# Patient Record
Sex: MALE | Race: WHITE | NOT HISPANIC OR LATINO | URBAN - METROPOLITAN AREA
[De-identification: names, ages, dates, MRNs, and addresses within clinical notes are randomized per-mention and may not be internally consistent; named-entity substitution may affect disease eponyms.]

---

## 2022-12-02 ENCOUNTER — EMERGENCY (EMERGENCY)
Facility: HOSPITAL | Age: 77
LOS: 1 days | Discharge: HOME | End: 2022-12-02
Attending: STUDENT IN AN ORGANIZED HEALTH CARE EDUCATION/TRAINING PROGRAM | Admitting: STUDENT IN AN ORGANIZED HEALTH CARE EDUCATION/TRAINING PROGRAM
Payer: MEDICARE

## 2022-12-02 VITALS
HEART RATE: 75 BPM | DIASTOLIC BLOOD PRESSURE: 79 MMHG | RESPIRATION RATE: 18 BRPM | OXYGEN SATURATION: 98 % | TEMPERATURE: 98 F | SYSTOLIC BLOOD PRESSURE: 114 MMHG

## 2022-12-02 DIAGNOSIS — R45.851 SUICIDAL IDEATIONS: ICD-10-CM
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
## 2022-12-02 DIAGNOSIS — F33.9 MAJOR DEPRESSIVE DISORDER, RECURRENT, UNSPECIFIED: ICD-10-CM

## 2022-12-02 DIAGNOSIS — R45.84 ANHEDONIA: ICD-10-CM

## 2022-12-02 DIAGNOSIS — E11.9 TYPE 2 DIABETES MELLITUS WITHOUT COMPLICATIONS: ICD-10-CM

## 2022-12-02 DIAGNOSIS — Z79.84 LONG TERM (CURRENT) USE OF ORAL HYPOGLYCEMIC DRUGS: ICD-10-CM

## 2022-12-02 DIAGNOSIS — U07.1 COVID-19: ICD-10-CM

## 2022-12-02 DIAGNOSIS — Z91.14 PATIENT'S OTHER NONCOMPLIANCE WITH MEDICATION REGIMEN: ICD-10-CM

## 2022-12-02 DIAGNOSIS — T43.226A UNDERDOSING OF SELECTIVE SEROTONIN REUPTAKE INHIBITORS, INITIAL ENCOUNTER: ICD-10-CM
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
## 2022-12-02 DIAGNOSIS — F43.20 ADJUSTMENT DISORDER, UNSPECIFIED: ICD-10-CM

## 2022-12-02 DIAGNOSIS — I10 ESSENTIAL (PRIMARY) HYPERTENSION: ICD-10-CM

## 2022-12-02 DIAGNOSIS — E78.5 HYPERLIPIDEMIA, UNSPECIFIED: ICD-10-CM

## 2022-12-02 LAB
AMPHET UR-MCNC: NEGATIVE — SIGNIFICANT CHANGE UP
ANION GAP SERPL CALC-SCNC: 12 MMOL/L — SIGNIFICANT CHANGE UP (ref 5–17)
APAP SERPL-MCNC: <5 UG/ML — LOW (ref 10–30)
APPEARANCE UR: ABNORMAL
BACTERIA # UR AUTO: PRESENT /HPF
BARBITURATES UR SCN-MCNC: NEGATIVE — SIGNIFICANT CHANGE UP
BASOPHILS # BLD AUTO: 0.01 K/UL — SIGNIFICANT CHANGE UP (ref 0–0.2)
BASOPHILS NFR BLD AUTO: 0.2 % — SIGNIFICANT CHANGE UP (ref 0–2)
BENZODIAZ UR-MCNC: NEGATIVE — SIGNIFICANT CHANGE UP
BILIRUB UR-MCNC: ABNORMAL
BUN SERPL-MCNC: 17 MG/DL — SIGNIFICANT CHANGE UP (ref 7–23)
CALCIUM SERPL-MCNC: 8.9 MG/DL — SIGNIFICANT CHANGE UP (ref 8.4–10.5)
CHLORIDE SERPL-SCNC: 100 MMOL/L — SIGNIFICANT CHANGE UP (ref 96–108)
CO2 SERPL-SCNC: 28 MMOL/L — SIGNIFICANT CHANGE UP (ref 22–31)
COCAINE METAB.OTHER UR-MCNC: NEGATIVE — SIGNIFICANT CHANGE UP
COLOR SPEC: YELLOW — SIGNIFICANT CHANGE UP
COMMENT - URINE: SIGNIFICANT CHANGE UP
CREAT SERPL-MCNC: 0.69 MG/DL — SIGNIFICANT CHANGE UP (ref 0.5–1.3)
DIFF PNL FLD: NEGATIVE — SIGNIFICANT CHANGE UP
EGFR: 95 ML/MIN/1.73M2 — SIGNIFICANT CHANGE UP
EOSINOPHIL # BLD AUTO: 0.01 K/UL — SIGNIFICANT CHANGE UP (ref 0–0.5)
EOSINOPHIL NFR BLD AUTO: 0.2 % — SIGNIFICANT CHANGE UP (ref 0–6)
EPI CELLS # UR: SIGNIFICANT CHANGE UP /HPF (ref 0–5)
ETHANOL SERPL-MCNC: <10 MG/DL — SIGNIFICANT CHANGE UP (ref 0–10)
GLUCOSE SERPL-MCNC: 139 MG/DL — HIGH (ref 70–99)
GLUCOSE UR QL: NEGATIVE — SIGNIFICANT CHANGE UP
HCT VFR BLD CALC: 41.5 % — SIGNIFICANT CHANGE UP (ref 39–50)
HGB BLD-MCNC: 14.4 G/DL — SIGNIFICANT CHANGE UP (ref 13–17)
HYALINE CASTS # UR AUTO: SIGNIFICANT CHANGE UP /LPF (ref 0–2)
IMM GRANULOCYTES NFR BLD AUTO: 0.5 % — SIGNIFICANT CHANGE UP (ref 0–0.9)
KETONES UR-MCNC: >=80 MG/DL
LEUKOCYTE ESTERASE UR-ACNC: NEGATIVE — SIGNIFICANT CHANGE UP
LYMPHOCYTES # BLD AUTO: 2 K/UL — SIGNIFICANT CHANGE UP (ref 1–3.3)
LYMPHOCYTES # BLD AUTO: 33.2 % — SIGNIFICANT CHANGE UP (ref 13–44)
MCHC RBC-ENTMCNC: 32.9 PG — SIGNIFICANT CHANGE UP (ref 27–34)
MCHC RBC-ENTMCNC: 34.7 GM/DL — SIGNIFICANT CHANGE UP (ref 32–36)
MCV RBC AUTO: 94.7 FL — SIGNIFICANT CHANGE UP (ref 80–100)
METHADONE UR-MCNC: NEGATIVE — SIGNIFICANT CHANGE UP
MONOCYTES # BLD AUTO: 0.44 K/UL — SIGNIFICANT CHANGE UP (ref 0–0.9)
MONOCYTES NFR BLD AUTO: 7.3 % — SIGNIFICANT CHANGE UP (ref 2–14)
NEUTROPHILS # BLD AUTO: 3.54 K/UL — SIGNIFICANT CHANGE UP (ref 1.8–7.4)
NEUTROPHILS NFR BLD AUTO: 58.6 % — SIGNIFICANT CHANGE UP (ref 43–77)
NITRITE UR-MCNC: NEGATIVE — SIGNIFICANT CHANGE UP
NRBC # BLD: 0 /100 WBCS — SIGNIFICANT CHANGE UP (ref 0–0)
OPIATES UR-MCNC: NEGATIVE — SIGNIFICANT CHANGE UP
PCP SPEC-MCNC: SIGNIFICANT CHANGE UP
PCP UR-MCNC: NEGATIVE — SIGNIFICANT CHANGE UP
PH UR: 6 — SIGNIFICANT CHANGE UP (ref 5–8)
PLATELET # BLD AUTO: 158 K/UL — SIGNIFICANT CHANGE UP (ref 150–400)
POTASSIUM SERPL-MCNC: 3.4 MMOL/L — LOW (ref 3.5–5.3)
POTASSIUM SERPL-SCNC: 3.4 MMOL/L — LOW (ref 3.5–5.3)
PROT UR-MCNC: 100 MG/DL
RAPID RVP RESULT: DETECTED
RBC # BLD: 4.38 M/UL — SIGNIFICANT CHANGE UP (ref 4.2–5.8)
RBC # FLD: 12.7 % — SIGNIFICANT CHANGE UP (ref 10.3–14.5)
RBC CASTS # UR COMP ASSIST: < 5 /HPF — SIGNIFICANT CHANGE UP
SALICYLATES SERPL-MCNC: 1.3 MG/DL — LOW (ref 2.8–20)
SARS-COV-2 RNA SPEC QL NAA+PROBE: DETECTED
SODIUM SERPL-SCNC: 140 MMOL/L — SIGNIFICANT CHANGE UP (ref 135–145)
SP GR SPEC: 1.02 — SIGNIFICANT CHANGE UP (ref 1–1.03)
THC UR QL: NEGATIVE — SIGNIFICANT CHANGE UP
TSH SERPL-MCNC: 1.24 UIU/ML — SIGNIFICANT CHANGE UP (ref 0.27–4.2)
UROBILINOGEN FLD QL: 1 E.U./DL — SIGNIFICANT CHANGE UP
WBC # BLD: 6.03 K/UL — SIGNIFICANT CHANGE UP (ref 3.8–10.5)
WBC # FLD AUTO: 6.03 K/UL — SIGNIFICANT CHANGE UP (ref 3.8–10.5)
WBC UR QL: < 5 /HPF — SIGNIFICANT CHANGE UP

## 2022-12-02 PROCEDURE — 99285 EMERGENCY DEPT VISIT HI MDM: CPT | Mod: FS,CS

## 2022-12-02 NOTE — ED ADULT NURSE REASSESSMENT NOTE - NS ED NURSE REASSESS COMMENT FT1
pt undressed, placed in gown and belongings removed and placed in labeled hospital bags.   1:1 sitter with pt.   security called for pt and belongings wanding.

## 2022-12-02 NOTE — ED ADULT NURSE NOTE - OBJECTIVE STATEMENT
· HPI Objective Statement: 76 yo male with h/o depression in the ER c/o suicidal thoughts. Pt reports he has not been taking his psychiatric medications for about a year. Denies any suicidal attempts or plans. Pt has no other complaints

## 2022-12-02 NOTE — ED ADULT NURSE REASSESSMENT NOTE - NS ED NURSE REASSESS COMMENT FT1
pt. was received ot bed 10 from triage, EKG was completed, pt. in gown, Security at bedside for wanding, assessment on-going, with 1:1 in place

## 2022-12-03 VITALS
DIASTOLIC BLOOD PRESSURE: 78 MMHG | OXYGEN SATURATION: 99 % | RESPIRATION RATE: 15 BRPM | HEART RATE: 70 BPM | TEMPERATURE: 99 F | SYSTOLIC BLOOD PRESSURE: 116 MMHG

## 2022-12-03 DIAGNOSIS — F43.20 ADJUSTMENT DISORDER, UNSPECIFIED: ICD-10-CM

## 2022-12-03 PROCEDURE — 0225U NFCT DS DNA&RNA 21 SARSCOV2: CPT

## 2022-12-03 PROCEDURE — 80048 BASIC METABOLIC PNL TOTAL CA: CPT

## 2022-12-03 PROCEDURE — 93005 ELECTROCARDIOGRAM TRACING: CPT

## 2022-12-03 PROCEDURE — 90792 PSYCH DIAG EVAL W/MED SRVCS: CPT | Mod: 95

## 2022-12-03 PROCEDURE — 84443 ASSAY THYROID STIM HORMONE: CPT

## 2022-12-03 PROCEDURE — 81001 URINALYSIS AUTO W/SCOPE: CPT

## 2022-12-03 PROCEDURE — 36415 COLL VENOUS BLD VENIPUNCTURE: CPT

## 2022-12-03 PROCEDURE — 85025 COMPLETE CBC W/AUTO DIFF WBC: CPT

## 2022-12-03 PROCEDURE — 80307 DRUG TEST PRSMV CHEM ANLYZR: CPT

## 2022-12-03 PROCEDURE — 99284 EMERGENCY DEPT VISIT MOD MDM: CPT

## 2022-12-03 RX ORDER — POTASSIUM CHLORIDE 20 MEQ
40 PACKET (EA) ORAL ONCE
Refills: 0 | Status: COMPLETED | OUTPATIENT
Start: 2022-12-03 | End: 2022-12-03

## 2022-12-03 RX ORDER — SERTRALINE 25 MG/1
1 TABLET, FILM COATED ORAL
Qty: 14 | Refills: 0
Start: 2022-12-03 | End: 2022-12-16

## 2022-12-03 RX ORDER — SERTRALINE 25 MG/1
50 TABLET, FILM COATED ORAL DAILY
Refills: 0 | Status: DISCONTINUED | OUTPATIENT
Start: 2022-12-03 | End: 2022-12-06

## 2022-12-03 RX ADMIN — Medication 40 MILLIEQUIVALENT(S): at 02:47

## 2022-12-03 RX ADMIN — SERTRALINE 50 MILLIGRAM(S): 25 TABLET, FILM COATED ORAL at 03:19

## 2022-12-03 NOTE — ED BEHAVIORAL HEALTH ASSESSMENT NOTE - SUMMARY
77M, living w roommate, works as a , PMH of HTN, HLD, DM2, psych hx of depression, no suicide attempts, no hospitalizations, not currently in outpatient treatment, no substance abuse issues, now BIBS accompanied by friend for worsening depression in setting of running out of zoloft    Not exactly clear what caused him to come in tonight vs another night and patient may be minimizing symptomatology, however he does not want to come into the hospital and wants to go home with refill. Therefore he would need to meet involuntary hospitalization criteria. He denies current suicide ideation, denies past attempts, is treatment seeking, future oriented, not psychotic, sober, on exam is able to laugh and smile appropriately without signs of psychosis. As such he does not appear to be an imminent threat to himself or anyone else and is therefore psychiatrically cleared for discharge in the interest of patient autonomy

## 2022-12-03 NOTE — ED BEHAVIORAL HEALTH ASSESSMENT NOTE - SAFETY PLAN ADDT'L DETAILS
Safety plan discussed with.../Education provided regarding environmental safety / lethal means restriction/Provision of National Suicide Prevention Lifeline 1-642-836-LYMX (8122)

## 2022-12-03 NOTE — ED PROVIDER NOTE - ATTENDING APP SHARED VISIT CONTRIBUTION OF CARE
78 yo male with h/o depression in the ER c/o suicidal thoughts. Pt reports he has not been taking his psychiatric medications for about a year. Denies any suicidal attempts or plans. Pt has no other complaints  Pt cooperative and calm on exam. labs done. pending evaluation by psychiatrist.

## 2022-12-03 NOTE — ED BEHAVIORAL HEALTH ASSESSMENT NOTE - CURRENT MEDICATION
Denies psychiatric medications, states that he takes lisinopril, metformin, something for cholesterol, doesn't remember names or doses

## 2022-12-03 NOTE — ED BEHAVIORAL HEALTH ASSESSMENT NOTE - DETAILS
Would not impact clinical decisionmaking at this time See  safety plan Denies Patient is referent but left message with friend, no indication to hold patient involuntarily until friend calls back

## 2022-12-03 NOTE — ED PROVIDER NOTE - OBJECTIVE STATEMENT
76 yo male with h/o depression in the ER c/o suicidal thoughts. Pt reports he has not been taking his psychiatric medications for about a year. Denies any suicidal attempts or plans. Pt has no other complaints

## 2022-12-03 NOTE — ED PROVIDER NOTE - CROS ED CONS ALL NEG
Administrations This Visit     cyanocobalamin injection 1,000 mcg     Admin Date  11/29/2021  12:00 Action  Given Dose  1,000 mcg Route  IntraMUSCular Site  Deltoid Left Administered By  Jose Armando Negrete RN    Ordering Provider: Rachael Garza MD    NDC: 94974-970-34    Lot#:     : [id number] [place] [id number]    Patient Supplied?: No              Patient tolerated injection well. Patient advised to wait 20 minutes in the office following the injection. No signs/symptoms of reaction noted after 20 minutes. negative...

## 2022-12-03 NOTE — ED BEHAVIORAL HEALTH ASSESSMENT NOTE - RISK ASSESSMENT
risk factors include medication nonadherence, lack of outpatient mental health care, insomnia (though chronic). protective factors include lack of prior attempts, lack of access to firearms, lack of psychosis, stable housing, sobriety, future oriented thinking, ability to safety plan, engagement in work

## 2022-12-03 NOTE — ED ADULT NURSE REASSESSMENT NOTE - NS ED NURSE REASSESS COMMENT FT1
[facility] called, states ready for consult, 1:1 notified, to move to room for telepsych exam

## 2022-12-03 NOTE — ED BEHAVIORAL HEALTH NOTE - BEHAVIORAL HEALTH NOTE
============  ED COURSE  ============  SOURCE: Self  ARRIVAL: Arrived as a walk in with his friend  BELONGINGS: Secured   BEHAVIOR: calm, cooperative, fairly groomed, alert and oriented  TREATMENT: No medication administered in the ED at this time.   VISITORS: Patient's friend was in the ED with pt but is no longer there. Patient's friend Peter: [phone]

## 2022-12-03 NOTE — ED PROVIDER NOTE - PROGRESS NOTE DETAILS
pt found to be covid19+. no cough, no SOB, VSS. n plan : symptomatic care. pt evaluated by tele-psychiatrist. D/c home recommended for further out pt f/u. resources provided to pt. will d/c home with Rx for 2 weeks as recommended by consulting psychiatrist.

## 2022-12-03 NOTE — ED PROVIDER NOTE - PATIENT PORTAL LINK FT
You can access the FollowMyHealth Patient Portal offered by [facility] by registering at the following website: [email or web address]. By joining [facility]’s FollowMyHealth portal, you will also be able to view your health information using other applications (apps) compatible with our system.

## 2022-12-03 NOTE — ED PROVIDER NOTE - NSFOLLOWUPINSTRUCTIONS_ED_ALL_ED_FT
Please follow up for further evaluation and treatment as recommended by psychiatrist and take your medications as prescribed.                                                                                                                                                                   Managing Depression, Adult      Depression is a mental health condition that affects your thoughts, feelings, and actions. Being diagnosed with depression can bring you relief if you did not know why you have felt or behaved a certain way. It could also leave you feeling overwhelmed with uncertainty about your future. Preparing yourself to manage your symptoms can help you feel more positive about your future.      How to manage lifestyle changes      Managing stress      Stress is your body's reaction to life changes and events, both good and bad. Stress can add to your feelings of depression. Learning to manage your stress can help lessen your feelings of depression.    Try some of the following approaches to reducing your stress (stress reduction techniques):  •Listen to music that you enjoy and that inspires you.      •Try using a meditation dhruv or take a meditation class.      •Develop a practice that helps you connect with your spiritual self. Walk in nature, pray, or go to a place of [employer].      •Do some deep breathing. To do this, inhale slowly through your nose. Pause at the top of your inhale for a few seconds and then exhale slowly, letting your muscles relax.      •Practice yoga to help relax and work your muscles.      Choose a stress reduction technique that suits your lifestyle and personality. These techniques take time and practice to develop. Set aside 5–15 minutes a day to do them. Therapists can offer training in these techniques. Other things you can do to manage stress include:  •Keeping a stress diary.      •Knowing your limits and saying no when you think something is too much.      •Paying attention to how you react to certain situations. You may not be able to control everything, but you can change your reaction.      •Adding humor to your life by watching funny films or TV shows.      •Making time for activities that you enjoy and that relax you.      Medicines     Medicines, such as antidepressants, are often a part of treatment for depression.  •Talk with your pharmacist or health care provider about all the medicines, supplements, and herbal products that you take, their possible side effects, and what medicines and other products are safe to take together.      •Make sure to report any side effects you may have to your health care provider.      Relationships    Your health care provider may suggest family therapy, couples therapy, or individual therapy as part of your treatment.      How to recognize changes    Everyone responds differently to treatment for depression. As you recover from depression, you may start to:  •Have more interest in doing activities.      •Feel less hopeless.      •Have more energy.      •Overeat less often, or have a better appetite.      •Have better mental focus.      It is important to recognize if your depression is not getting better or is getting worse. The symptoms you had in the beginning may return, such as:  •Tiredness (fatigue) or low energy.      •Eating too much or too little.      •Sleeping too much or too little.      •Feeling restless, agitated, or hopeless.      •Trouble focusing or making decisions.      •Unexplained physical complaints.      •Feeling irritable, angry, or aggressive.      If you or your family members notice these symptoms coming back, let your health care provider know right away.      Follow these instructions at home:      Activity      •Try to get some form of exercise each day, such as walking, biking, swimming, or lifting weights.      •Practice stress reduction techniques.      •Engage your mind by taking a class or doing some volunteer work.      Lifestyle     •Get the right amount and quality of sleep.      •Cut down on using caffeine, tobacco, alcohol, and other potentially harmful substances.      •Eat a healthy diet that includes plenty of vegetables, fruits, whole grains, low-fat dairy products, and lean protein. Do not eat a lot of foods that are high in solid fats, added sugars, or salt (sodium).      General instructions     •Take over-the-counter and prescription medicines only as told by your health care provider.      •Keep all follow-up visits as told by your health care provider. This is important.        Where to find support      Talking to others      Friends and family members can be sources of support and guidance. Talk to trusted friends or family members about your condition. Explain your symptoms to them, and let them know that you are working with a health care provider to treat your depression. Tell friends and family members how they also can be helpful.    Finances     •Find appropriate mental health providers that fit with your financial situation.      •Talk with your health care provider about options to get reduced prices on your medicines.        Where to find more information    You can find support in your area from:   •Anxiety [employer] [place] (ADAA): www.adaa.org      •[facility] [place]: www.mentalhealth[place].net      •National [place] on Mental Illness: www.brady.org        Contact a health care provider if:  •You stop taking your antidepressant medicines, and you have any of these symptoms:  •Nausea.      •Headache.      •Light-headedness.      •Chills and body aches.      •Not being able to sleep (insomnia).        •You or your friends and family think your depression is getting worse.        Get help right away if:    •You have thoughts of hurting yourself or others.      If you ever feel like you may hurt yourself or others, or have thoughts about taking your own life, get help right away. Go to your nearest emergency department or:    • Call your local emergency services (213 in the [place]       • Call a suicide crisis helpline, such as the National Suicide Prevention Lifeline at 1-157.962.9463 or 422 in the [place] This is open 24 hours a day in the [place]       • Text the Crisis Text Line at 974669 (in the [place]         Summary    •If you are diagnosed with depression, preparing yourself to manage your symptoms is a good way to feel positive about your future.      •Work with your health care provider on a management plan that includes stress reduction techniques, medicines (if applicable), therapy, and healthy lifestyle habits.      •Keep talking with your health care provider about how your treatment is working.      • If you have thoughts about taking your own life, call a suicide crisis helpline or text a crisis text line.       This information is not intended to replace advice given to you by your health care provider. Make sure you discuss any questions you have with your health care provider. Please follow up for further evaluation and treatment as recommended by psychiatrist and take your medications as prescribed.                                                                                                                                                                   Managing Depression, Adult      Depression is a mental health condition that affects your thoughts, feelings, and actions. Being diagnosed with depression can bring you relief if you did not know why you have felt or behaved a certain way. It could also leave you feeling overwhelmed with uncertainty about your future. Preparing yourself to manage your symptoms can help you feel more positive about your future.      How to manage lifestyle changes      Managing stress      Stress is your body's reaction to life changes and events, both good and bad. Stress can add to your feelings of depression. Learning to manage your stress can help lessen your feelings of depression.    Try some of the following approaches to reducing your stress (stress reduction techniques):  •Listen to music that you enjoy and that inspires you.      •Try using a meditation dhruv or take a meditation class.      •Develop a practice that helps you connect with your spiritual self. Walk in nature, pray, or go to a place of [employer].      •Do some deep breathing. To do this, inhale slowly through your nose. Pause at the top of your inhale for a few seconds and then exhale slowly, letting your muscles relax.      •Practice yoga to help relax and work your muscles.      Choose a stress reduction technique that suits your lifestyle and personality. These techniques take time and practice to develop. Set aside 5–15 minutes a day to do them. Therapists can offer training in these techniques. Other things you can do to manage stress include:  •Keeping a stress diary.      •Knowing your limits and saying no when you think something is too much.      •Paying attention to how you react to certain situations. You may not be able to control everything, but you can change your reaction.      •Adding humor to your life by watching funny films or TV shows.      •Making time for activities that you enjoy and that relax you.      Medicines     Medicines, such as antidepressants, are often a part of treatment for depression.  •Talk with your pharmacist or health care provider about all the medicines, supplements, and herbal products that you take, their possible side effects, and what medicines and other products are safe to take together.      •Make sure to report any side effects you may have to your health care provider.      Relationships    Your health care provider may suggest family therapy, couples therapy, or individual therapy as part of your treatment.      How to recognize changes    Everyone responds differently to treatment for depression. As you recover from depression, you may start to:  •Have more interest in doing activities.      •Feel less hopeless.      •Have more energy.      •Overeat less often, or have a better appetite.      •Have better mental focus.      It is important to recognize if your depression is not getting better or is getting worse. The symptoms you had in the beginning may return, such as:  •Tiredness (fatigue) or low energy.      •Eating too much or too little.      •Sleeping too much or too little.      •Feeling restless, agitated, or hopeless.      •Trouble focusing or making decisions.      •Unexplained physical complaints.      •Feeling irritable, angry, or aggressive.      If you or your family members notice these symptoms coming back, let your health care provider know right away.      Follow these instructions at home:      Activity      •Try to get some form of exercise each day, such as walking, biking, swimming, or lifting weights.      •Practice stress reduction techniques.      •Engage your mind by taking a class or doing some volunteer work.      Lifestyle     •Get the right amount and quality of sleep.      •Cut down on using caffeine, tobacco, alcohol, and other potentially harmful substances.      •Eat a healthy diet that includes plenty of vegetables, fruits, whole grains, low-fat dairy products, and lean protein. Do not eat a lot of foods that are high in solid fats, added sugars, or salt (sodium).      General instructions     •Take over-the-counter and prescription medicines only as told by your health care provider.      •Keep all follow-up visits as told by your health care provider. This is important.        Where to find support      Talking to others      Friends and family members can be sources of support and guidance. Talk to trusted friends or family members about your condition. Explain your symptoms to them, and let them know that you are working with a health care provider to treat your depression. Tell friends and family members how they also can be helpful.    Finances     •Find appropriate mental health providers that fit with your financial situation.      •Talk with your health care provider about options to get reduced prices on your medicines.        Where to find more information    You can find support in your area from:   •An[employer] [place] (ADAA): www.adaa.org      •[facility] [place]: www.mentalhealth[place].net      •National [place] on Mental Illness: www.brady.org        Contact a health care provider if:  •You stop taking your antidepressant medicines, and you have any of these symptoms:  •Nausea.      •Headache.      •Light-headedness.      •Chills and body aches.      •Not being able to sleep (insomnia).        •You or your friends and family think your depression is getting worse.        Get help right away if:    •You have thoughts of hurting yourself or others.      If you ever feel like you may hurt yourself or others, or have thoughts about taking your own life, get help right away. Go to your nearest emergency department or:    • Call your local emergency services (989 in the [place]       • Call a suicide crisis helpline, such as the National Suicide Prevention Lifeline at 1-281.699.9102 or 206 in the [place] This is open 24 hours a day in the [place]       • Text the Crisis Text Line at 331349 (in the [place]         Summary    •If you are diagnosed with depression, preparing yourself to manage your symptoms is a good way to feel positive about your future.      •Work with your health care provider on a management plan that includes stress reduction techniques, medicines (if applicable), therapy, and healthy lifestyle habits.      •Keep talking with your health care provider about how your treatment is working.      • If you have thoughts about taking your own life, call a suicide crisis helpline or text a crisis text line.       This information is not intended to replace advice given to you by your health care provider. Make sure you discuss any questions you have with your health care provider.            COVID-19      COVID-19, or coronavirus disease 2019, is a systemic infection that is caused by a novel coronavirus called SARS-CoV-2. In some people, the virus may not cause any symptoms. In others, it may cause mild or severe symptoms. Some people with severe infection develop severe disease, which may lead to acute respiratory distress syndrome and shock.      What are the causes?  The human body, showing how the coronavirus travels from the air to a person's lungs.   This illness is caused by a virus. The virus may be in the air or on surfaces as droplets or aerosols of various sizes. You may catch the virus by:  •Breathing in droplets from an infected person. Droplets can be spread by a person breathing, speaking, singing, coughing, or sneezing.      •Touching something, like a table or a doorknob, that was exposed to the virus (is contaminated) and then touching your mouth, nose, or eyes.        What increases the risk?    Risk for infection:     You are more likely to become infected with the COVID-19 virus if:  •You are within 6 ft (1.8 m) of a person with COVID-19 for 15 minutes or longer.      •You are providing care for a person who is infected with COVID-19.      •You are in close personal contact with other people. Close personal contact includes hugging, kissing, or sharing eating or drinking utensils.      Risk for serious illness due to COVID-19:    You are more likely to become seriously ill from the COVID-19 virus if:  •You have cancer.    •You have a long-term (chronic) disease, such as:  •Chronic lung disease, including pulmonary embolism, chronic obstructive pulmonary disease, and cystic fibrosis.      •Long-term disease that lowers your body's ability to fight infection (immunocompromise).      •Serious cardiac conditions, such as heart failure, coronary artery disease, or cardiomyopathy.      •Diabetes.      •Chronic kidney disease.      •Liver diseases, including cirrhosis, nonalcoholic fatty liver disease, alcoholic liver disease, or autoimmune hepatitis.        •You are obese.      •You are pregnant or recently pregnant.      •You have sickle cell disease.        What are the signs or symptoms?    Symptoms of this condition can range from mild to severe. Symptoms may appear any time from 2 to 14 days after being exposed to the virus. They include:  •Fever or chills.      •Difficulty breathing or having shortness of breath.      •Feeling tired or very tired.      •Headaches, body aches, or muscle aches.      •Runny or stuffy nose, sneezing, cough, sore throat.      •New loss of taste or smell. This is rare.      Some people may also have stomach problems, such as nausea, vomiting, or diarrhea.    Other people may not have any symptoms of COVID-19.      How is this diagnosed?  A sample being collected by swabbing the nose.   This condition may be diagnosed by testing samples to check for the COVID-19 virus. The most common tests are the PCR test and the antigen test. Tests may be done in the lab or at home. They include:  •Using a swab to take a sample of fluid from the back of your nose and throat (nasopharyngeal fluid), from your nose, or from your throat.      •Testing a sample of saliva from your mouth.      •Testing a sample of coughed-up mucus from your lungs (sputum).        How is this treated?    Treatment for COVID-19 infection depends on the severity of the condition.  •Mild symptoms can be managed at home with rest, fluids, and over-the-counter medicines.    •Serious symptoms may be treated in a hospital intensive care unit, or ICU. Treatment in the ICU may include:  •Supplemental oxygen. Extra oxygen is given through a tube in the nose, a face mask, or a tavarez.    •Medicines. You may be given medicines:  •To help your body fight off certain viruses that can cause disease (antivirals).      •To reduce inflammation and suppress the immune system (corticosteroids).      •To prevent or treat blood clots, if they develop (antithrombotics).        •Antibodies made in a lab that can restore or strengthen your body's natural immune system (monoclonal antibody).      •Positioning you to lie on your stomach (prone position). This makes it easier for oxygen to get into the lungs.      •Infection control measures.        If you are at risk for more serious illness due to COVID-19, your health care provider may prescribe a combination of two long-acting monoclonal antibodies, administered together every 6 months.      How is this prevented?    To protect yourself:   •Get vaccinated. COVID-19 vaccines are available for everyone aged 6 months and older.  •Vaccination is recommended for women who are pregnant, may become pregnant, or are breastfeeding.      •Patients who require major surgery should plan their vaccination for several days before or after the surgery.      •Talk to your health care provider about receiving experimental monoclonal antibodies. This treatment has been approved under emergency use authorization to prevent severe illness before or after you are exposed to the COVID-19 virus. You may be given monoclonal antibodies if:  •You are moderately or severely immunocompromised. This includes treatments that lower your immune response. People with immunocompromise may not develop protection against COVID-19 when they are vaccinated.      •You cannot be vaccinated. You may not receive a vaccine if you have a severe allergic reaction to the vaccine or its components.      •You are not fully vaccinated.      •You are in close contact with a person who is infected with SARS-CoV-2, or at high risk of exposure to SARS-CoV-2, in an institution in which COVID-19 is occurring.      •You are at risk of illness from new variants of the COVID-19 virus.        To protect others:     If you have symptoms of COVID-19, take steps to prevent the virus from spreading to others.  •Stay home. Leave your house only to seek medical care. Do not use public transport, if possible.      •Do not travel while you are sick.      •Wash your hands often with soap and water for at least 20 seconds. If soap and water are not available, use alcohol-based hand .      •Stay away from other members of your household. Let healthy household members care for children and pets, if possible. If you have to care for children or pets, wash your hands often and wear a mask. If possible, stay in your own room, separate from others. Use a different bathroom.      •Make sure that all people in your household wash their hands well and often.      •Cough or sneeze into a tissue or your sleeve or elbow. Do not cough or sneeze into your hand or into the air.        Where to find more information    •Centers for Disease Control and Prevention: www.cdc.gov/coronavirus      •World Health Organization: www.who.int/health-topics/coronavirus        Get help right away if:    •You have trouble breathing.      •You have pain or pressure in your chest.      •You have confusion.      •You have bluish lips and fingernails.      •You have difficulty waking from sleep.      •You have symptoms that get worse.      These symptoms may be an emergency. Get help right away. Call 911.   • Do not wait to see if the symptoms will go away.        •  Do not drive yourself to the hospital.         Summary    •COVID-19 is a respiratory infection that is caused by a virus.      •Some people with a severe COVID-19 infection develop severe disease, which may lead to acute respiratory distress syndrome and shock.      •The virus that causes COVID-19 is contagious. This means that it can spread from person to person through droplets from breathing, speaking, singing, coughing, or sneezing.      •Mild symptoms of COVID-19 can be managed at home with rest, fluids, and over-the-counter medicines.      This information is not intended to replace advice given to you by your health care provider. Make sure you discuss any questions you have with your health care provider.

## 2022-12-03 NOTE — ED BEHAVIORAL HEALTH ASSESSMENT NOTE - MEDICATIONS (PRESCRIPTIONS, DIRECTIONS)
zoloft 50mg PO q Day x 7y days, patient knows to call his intern for full refill or follow up with an outpatient psychiatrist

## 2022-12-03 NOTE — ED ADULT NURSE REASSESSMENT NOTE - NS ED NURSE REASSESS COMMENT FT1
medicated as noted, PA has spoke with psych, who states pt. may be d/c home, awaiting final dispo., Security was notified to return pt. belongings,

## 2022-12-03 NOTE — ED BEHAVIORAL HEALTH ASSESSMENT NOTE - HPI (INCLUDE ILLNESS QUALITY, SEVERITY, DURATION, TIMING, CONTEXT, MODIFYING FACTORS, ASSOCIATED SIGNS AND SYMPTOMS)
77M, living w roommate, works as a , PMH of HTN, HLD, DM2, psych hx of depression, no suicide attempts, no hospitalizations, not currently in outpatient treatment, no substance abuse issues, now BIBS accompanied by friend for worsening depression in setting of running out of zoloft    Patient states that he has had several stressors recently - running out of zoloft months ago (states that afterwards he has been taking 5htp vitamin but ran out of that as well, states that he didn't feel he needed zoloft so didn't call for a refill), his landlord has been pressuring him to give up his rent controlled apartment (which he states he has accepted he just has to deal with), and that he had a stressful situation with a client of his that he thinks is resolving. He states that these came to a head suddenly and had him feeling stressed, so he came to the ED to see if he could get a refill of zoloft and to talk to someone. He states that his mood has been low for the last few weeks and he chronically gets around 5 hours of sleep, but states that he is able to enjoy himself, denies hopelessness, denies changes in sleep or appetite, and he denies thoughts of wanting to hurt or kill himself or anyone else. He denies having alcohol in 32 years, denies having drugs in 30 years, denies that he has a gun at home. He doesn't currently have an outpatient therapist or psychiatrist but states that he would be open to meeting with one in the future. He inquired about sleeping medications that were not narcotics as he states that he has always been reticent to start something that may be addicting. He states that he does not need to come into the hospital, doesn't want to, wants to go home, requests zoloft refill. He denies that there was any specific reason that he came today vs last week or another day other than saying that he was stressed.     Patient gave consent for us to speak with his friend Bam. Called and left message with Bam at [phone] requesting call back, did not leave PHI.

## 2023-05-09 ENCOUNTER — INPATIENT (INPATIENT)
Facility: HOSPITAL | Age: 78
LOS: 7 days | Discharge: NURSING, REHAB OR LONG-TERM CARE | DRG: 286 | End: 2023-05-17
Attending: INTERNAL MEDICINE | Admitting: INTERNAL MEDICINE
Payer: MEDICARE

## 2023-05-09 VITALS
OXYGEN SATURATION: 95 % | SYSTOLIC BLOOD PRESSURE: 160 MMHG | HEART RATE: 87 BPM | TEMPERATURE: 98 F | RESPIRATION RATE: 18 BRPM | DIASTOLIC BLOOD PRESSURE: 95 MMHG | HEIGHT: 70 IN | WEIGHT: 190.04 LBS

## 2023-05-09 LAB
ALBUMIN SERPL ELPH-MCNC: 3.7 G/DL — SIGNIFICANT CHANGE UP (ref 3.3–5)
ALP SERPL-CCNC: 85 U/L — SIGNIFICANT CHANGE UP (ref 40–120)
ALT FLD-CCNC: 398 U/L — HIGH (ref 10–45)
ANION GAP SERPL CALC-SCNC: 12 MMOL/L — SIGNIFICANT CHANGE UP (ref 5–17)
APTT BLD: 27.8 SEC — SIGNIFICANT CHANGE UP (ref 27.5–35.5)
AST SERPL-CCNC: 230 U/L — HIGH (ref 10–40)
BASOPHILS # BLD AUTO: 0.02 K/UL — SIGNIFICANT CHANGE UP (ref 0–0.2)
BASOPHILS NFR BLD AUTO: 0.1 % — SIGNIFICANT CHANGE UP (ref 0–2)
BILIRUB SERPL-MCNC: 2.5 MG/DL — HIGH (ref 0.2–1.2)
BUN SERPL-MCNC: 40 MG/DL — HIGH (ref 7–23)
CALCIUM SERPL-MCNC: 9.4 MG/DL — SIGNIFICANT CHANGE UP (ref 8.4–10.5)
CHLORIDE SERPL-SCNC: 94 MMOL/L — LOW (ref 96–108)
CK MB CFR SERPL CALC: 5 NG/ML — SIGNIFICANT CHANGE UP (ref 0–6.7)
CK SERPL-CCNC: 179 U/L — SIGNIFICANT CHANGE UP (ref 30–200)
CO2 SERPL-SCNC: 34 MMOL/L — HIGH (ref 22–31)
CREAT SERPL-MCNC: 0.99 MG/DL — SIGNIFICANT CHANGE UP (ref 0.5–1.3)
EGFR: 78 ML/MIN/1.73M2 — SIGNIFICANT CHANGE UP
EOSINOPHIL # BLD AUTO: 0.01 K/UL — SIGNIFICANT CHANGE UP (ref 0–0.5)
EOSINOPHIL NFR BLD AUTO: 0.1 % — SIGNIFICANT CHANGE UP (ref 0–6)
GLUCOSE SERPL-MCNC: 143 MG/DL — HIGH (ref 70–99)
HCT VFR BLD CALC: 43.5 % — SIGNIFICANT CHANGE UP (ref 39–50)
HGB BLD-MCNC: 14.5 G/DL — SIGNIFICANT CHANGE UP (ref 13–17)
IMM GRANULOCYTES NFR BLD AUTO: 0.5 % — SIGNIFICANT CHANGE UP (ref 0–0.9)
INR BLD: 1.71 — HIGH (ref 0.88–1.16)
LYMPHOCYTES # BLD AUTO: 13.9 % — SIGNIFICANT CHANGE UP (ref 13–44)
LYMPHOCYTES # BLD AUTO: 2.03 K/UL — SIGNIFICANT CHANGE UP (ref 1–3.3)
MCHC RBC-ENTMCNC: 32.5 PG — SIGNIFICANT CHANGE UP (ref 27–34)
MCHC RBC-ENTMCNC: 33.3 GM/DL — SIGNIFICANT CHANGE UP (ref 32–36)
MCV RBC AUTO: 97.5 FL — SIGNIFICANT CHANGE UP (ref 80–100)
MONOCYTES # BLD AUTO: 1.04 K/UL — HIGH (ref 0–0.9)
MONOCYTES NFR BLD AUTO: 7.1 % — SIGNIFICANT CHANGE UP (ref 2–14)
NEUTROPHILS # BLD AUTO: 11.39 K/UL — HIGH (ref 1.8–7.4)
NEUTROPHILS NFR BLD AUTO: 78.3 % — HIGH (ref 43–77)
NRBC # BLD: 0 /100 WBCS — SIGNIFICANT CHANGE UP (ref 0–0)
PLATELET # BLD AUTO: 172 K/UL — SIGNIFICANT CHANGE UP (ref 150–400)
POTASSIUM SERPL-MCNC: 2.9 MMOL/L — CRITICAL LOW (ref 3.5–5.3)
POTASSIUM SERPL-SCNC: 2.9 MMOL/L — CRITICAL LOW (ref 3.5–5.3)
PROT SERPL-MCNC: 6.4 G/DL — SIGNIFICANT CHANGE UP (ref 6–8.3)
PROTHROM AB SERPL-ACNC: 20.5 SEC — HIGH (ref 10.5–13.4)
RBC # BLD: 4.46 M/UL — SIGNIFICANT CHANGE UP (ref 4.2–5.8)
RBC # FLD: 14.7 % — HIGH (ref 10.3–14.5)
SODIUM SERPL-SCNC: 140 MMOL/L — SIGNIFICANT CHANGE UP (ref 135–145)
TROPONIN T SERPL-MCNC: 0.04 NG/ML — CRITICAL HIGH (ref 0–0.01)
WBC # BLD: 14.56 K/UL — HIGH (ref 3.8–10.5)
WBC # FLD AUTO: 14.56 K/UL — HIGH (ref 3.8–10.5)

## 2023-05-09 PROCEDURE — 71275 CT ANGIOGRAPHY CHEST: CPT | Mod: 26,MA

## 2023-05-09 PROCEDURE — 99285 EMERGENCY DEPT VISIT HI MDM: CPT

## 2023-05-09 PROCEDURE — 71045 X-RAY EXAM CHEST 1 VIEW: CPT | Mod: 26

## 2023-05-09 RX ORDER — POTASSIUM CHLORIDE 20 MEQ
40 PACKET (EA) ORAL ONCE
Refills: 0 | Status: COMPLETED | OUTPATIENT
Start: 2023-05-09 | End: 2023-05-09

## 2023-05-09 RX ORDER — FUROSEMIDE 40 MG
20 TABLET ORAL ONCE
Refills: 0 | Status: COMPLETED | OUTPATIENT
Start: 2023-05-09 | End: 2023-05-09

## 2023-05-09 RX ORDER — FUROSEMIDE 40 MG
40 TABLET ORAL ONCE
Refills: 0 | Status: COMPLETED | OUTPATIENT
Start: 2023-05-09 | End: 2023-05-09

## 2023-05-09 RX ORDER — LISINOPRIL 2.5 MG/1
10 TABLET ORAL ONCE
Refills: 0 | Status: COMPLETED | OUTPATIENT
Start: 2023-05-09 | End: 2023-05-09

## 2023-05-09 RX ORDER — POTASSIUM CHLORIDE 20 MEQ
10 PACKET (EA) ORAL
Refills: 0 | Status: COMPLETED | OUTPATIENT
Start: 2023-05-09 | End: 2023-05-09

## 2023-05-09 RX ADMIN — Medication 40 MILLIEQUIVALENT(S): at 20:21

## 2023-05-09 RX ADMIN — Medication 20 MILLIGRAM(S): at 21:30

## 2023-05-09 RX ADMIN — Medication 100 MILLIEQUIVALENT(S): at 22:26

## 2023-05-09 RX ADMIN — Medication 40 MILLIGRAM(S): at 23:45

## 2023-05-09 RX ADMIN — Medication 100 MILLIEQUIVALENT(S): at 20:22

## 2023-05-09 NOTE — ED PROVIDER NOTE - PHYSICAL EXAMINATION
Constitutional: Elderly, frail, awake, alert, oriented to person, place, time/situation and in no apparent distress.  ENMT: Airway patent. Normal MM  Eyes: Clear bilaterally  Cardiac: Normal rate, regular rhythm.  Heart sounds S1, S2.  No murmurs, rubs or gallops. + mild JVD, trace b/l pitting edema. POCUS w/o sig pericardial effusion  Respiratory: Breaths sounds equal and clear b/l. No W/R/R. No increased WOB, tachypnea, or accessory mm use. Pt speaks in full sentences. Spo2 to 92-93% on RA at rest in bed  Gastrointestinal: Abd soft, NT, ND, NABS. No guarding, rebound, or rigidity. No pulsatile abdominal masses. No organomegaly appreciated.    Musculoskeletal: Range of motion is not limited. no calf ttp  Neuro: Alert and oriented x 3, face symmetric and speech fluent. Strength 5/5 x 4 ext and symmetric, nml gross motor movement, nml gait. No focal deficits noted.  Skin: Skin normal color for race, warm, dry and intact. No evidence of rash.  Psych: Alert and oriented to person, place, time/situation. normal mood and affect. no apparent risk to self or others.

## 2023-05-09 NOTE — ED ADULT NURSE REASSESSMENT NOTE - NS ED NURSE REASSESS COMMENT FT1
Pt back from CT. Potassium IVPB infusing at this time. Pt given lasix IVP. Pt endorsed no noticeable improvement of chest dullness, but denies worsening of dullness. Pt breathing even and slightly labored on 2L NC. 93% on monitor. Pt HR in the 90s w/ SR occ PVC. Bed locked in lowest position with call light and urinal in reach. MD Marie verbally endorsed pt okay to have some PO fluids. pending CTA results.

## 2023-05-09 NOTE — ED PROVIDER NOTE - NS ED ROS FT
Constitutional: No fever or chills.   Eyes: No pain, blurry vision, or discharge.  ENMT: No hearing changes, pain, discharge or infections. No neck pain or stiffness.  Cardiac: See HPI  Respiratory: No cough. No hemoptysis. No history of asthma or RAD.  GI: No nausea, vomiting, diarrhea or abdominal pain.  : No dysuria, frequency or burning.  MS: No myalgia, muscle weakness, joint pain or back pain.  Neuro: No headache or weakness. No LOC.  Skin: No skin rash.   Except as documented in the HPI, all other systems are negative.

## 2023-05-09 NOTE — ED ADULT TRIAGE NOTE - CHIEF COMPLAINT QUOTE
Pt w PMH DM, HTN, CHF reports CP, SOB, and "irregularly heart rate." Pt's o2 sat decreases as low as 84% when talking.

## 2023-05-09 NOTE — ED PROVIDER NOTE - CARE PLAN
Principal Discharge DX:	Acute decompensated heart failure  Secondary Diagnosis:	Hyperkalemia   1 Principal Discharge DX:	Acute decompensated heart failure  Secondary Diagnosis:	Hypokalemia

## 2023-05-09 NOTE — ED PROVIDER NOTE - CLINICAL SUMMARY MEDICAL DECISION MAKING FREE TEXT BOX
Pt p/w CP, SOB. DDx includes but not limited to acute decompensated HF, PE, ACS, angina, other pathology. No sig pericardial effusion on bedside US. Symmetric BP's and pulses. Monitor in ED. CHeck labs, EKG, CXR, CTA PE protocol, BP control, likely diuresis. Anticipate admission

## 2023-05-09 NOTE — ED PROVIDER NOTE - WR INTERPRETATION 1
CXR negative - No infiltrates, No consolidation, No atelectasis seen, No CHF, + cardiomegaly, No pleural effusions

## 2023-05-09 NOTE — ED PROVIDER NOTE - OBJECTIVE STATEMENT
Pt w/ Pt w/ PMHx NIDDM on Metformin, HTN on Lisinopril / HCTZ, CHF on unknown meds, Depression on Zoloft, p/w 1-2 days of intermittent, substernal, non radiating, dull, worse laying back, better sitting up and standing. He reports associated SOB, orthopnea. No LE edema. No cough, f/c, hemoptysis. No abd pain, n/v. Quit drinking 3-5 years ago. hasn't seen a doctor in 2 years. Admits medication non compliance

## 2023-05-09 NOTE — ED ADULT NURSE NOTE - OBJECTIVE STATEMENT
Pt endorsed he was felling a dullness of pain in his chest since yesterday that has been constant. pt also endorsed episodes of sob. pt was concerned and brought in by EMS to get himself evaluated. pt denies n,v, lightheadedness, dizziness, recent falls, fevers, chills at home. pt notably disheveled and poor hygiene, baseline mobility is ambulation of short distance w/ rollator. pt skin color appropriate for ethnicity. pt SR on monitor w/ occasional PVC. Pt placed on NC 2L d/t desaturation to 84%, saturating 96% post intervention. pt breathing currently even and unlabored with equal chest rise and fall.

## 2023-05-09 NOTE — ED ADULT NURSE NOTE - ALCOHOL PRE SCREEN (AUDIT - C)
Patient:   VIRGIL MATHEWS            MRN: [id number]            FIN: [id number]               Age:   47 years     Sex:  Male     :  1969   Associated Diagnoses:   Elevated lipids   Author:   Silvio Burns MD      Visit Information      Date of Service: 2017 12:02 pm  Performing Location: [facility]  Encounter#: [id number]      Chief Complaint   2017 12:17 PM CST   f/up cardiac calcium scan        Interval History   chief complaint and symptoms as noted above confirmed with patient       Review of Systems   Constitutional:  Negative.    Respiratory:  Negative.    Cardiovascular:  Negative.       Health Status   Allergies:    Allergic Reactions (Selected)  Severity Not Documented  Cats (No reactions were documented)  Citalopram (Hives)   Medications:  (Selected)   Prescriptions  Prescribed  Flexeril 10 mg oral tablet: 1 tab(s) ( 10 mg ), PO, TID, PRN: for pain, # 30 tab(s), 1 Refill(s), Type: Maintenance, called to pharmacy (Rx), 1 tab(s) po tid,PRN:for pain  Lipitor 20 mg oral tablet: 1 tab(s) ( 20 mg ), PO, Daily, # 90 tab(s), 1 Refill(s), Type: Maintenance, Pharmacy: [facility], 1 tab(s) po daily  Pataday 0.2% ophthalmic solution: 1 drop(s), both eyes, daily, # 2.5 mL, 5 Refill(s), Type: Soft Stop, Pharmacy: [facility], 1 drop(s) both eyes daily  Xarelto 20 mg oral tablet: 1 tab(s) ( 20 mg ), po, qpm, # 90 tab(s), 1 Refill(s), Type: Maintenance, Pharmacy: [facility], 1 tab(s) po qpm  econazole 1% topical cream: 1 modesto, TOP, BID, # 85 g, 5 Refill(s), Type: Maintenance, Pharmacy: [facility], 1 modesto top bid  lisinopril 10 mg oral tablet: 1 tab(s) ( 10 mg ), po, daily, # 90 tab(s), 1 Refill(s), Type: Maintenance, Pharmacy: [facility], 1 tab(s) po daily  raNITIdine 300 mg oral tablet: 1 tab(s) ( 300 mg ), po, daily, # 90 tab(s), 1 Refill(s), Type: Maintenance, Pharmacy: [facility], 1 tab(s) po daily  traMADol 50 mg oral tablet: 1 tab(s) ( 50 mg  ), PO, q4hr, PRN: for pain, # 60 tab(s), 1 Refill(s), Type: Maintenance, called to pharmacy (Rx)  venlafaxine 150 mg oral capsule, extended release: 1 cap(s) ( 150 mg ), po, daily, # 90 cap(s), 1 Refill(s), Type: Maintenance, Pharmacy: [facility], 1 cap(s) po daily  Documented Medications  Documented  Claritin 10 mg oral tablet: 1 tab(s) ( 10 mg ), po, daily, 0 Refill(s), Type: Maintenance  Flonase 50 mcg/inh nasal spray: 2 spray(s), nasal, daily, 0 Refill(s), Type: Maintenance  Multiple Vitamins oral tablet: 1 tab(s), PO, Daily, 0 Refill(s)  Tylenol: PO, 0 Refill(s)   Problem list:    All Problems (Selected)  GERD (Gastroesophageal Reflux Disease) / ICD-9-.81 / Confirmed  Low HDL (under 40) / SNOMED CT 483586526 / Confirmed  HTN (Hypertension) / ICD-9-.9 / Confirmed  Dysthymia / ICD-9-.4 / Confirmed  Obese / ICD-9-.00 / Probable  Seasonal Allergies / ICD-9-.9 / Confirmed  Back pain, chronic / ICD-9-.5 / Confirmed  Snoring disorder / ICD-9-.09 / Confirmed  Carpal tunnel syndrome, left / ICD-9-.0 / Confirmed  Multiple pulmonary emboli / SNOMED CT 18383576 / Confirmed  Bilateral pulmonary embolism / SNOMED CT 69811297 / Confirmed      Histories   Past Medical History:    Active  GERD (Gastroesophageal Reflux Disease) (530.81)  Low HDL (under 40) (163277485)  HTN (Hypertension) (401.9)  Seasonal Allergies (477.9)  Comments:  12/19/2012 CST 9:33 AM Peace Qureshi  Stable.  Back pain, chronic (724.5)  Comments:  12/19/2012 CST 9:54 AM Peace Qureshi  An MRI has been done previously, maybe in 2005.  Snoring disorder (786.09)  Comments:  12/19/2012 CST 9:40 AM Peace Qureshi  ENT evaluation by Dr. Jaleel Douglass on 05/07/2008.  A sleep study was performed; there was no evidence of apnea.    12/19/2012 CST 9:44 AM Peace Qureshi  This began after a severe pharyngitis.  Carpal tunnel syndrome, left (354.0)  Comments:  12/19/2012 CST 9:49 AM ANIYAH Ibarra  [facility]  Marcaine and Celestone injection done on 08/17/2007.  Resolved  *[email or web address] - Bilateral pulmonary emboli: Onset on 5/15/2014 at 44 years.  Resolved on 5/16/2014 at 44 years.  Folliculitis (14225536): Onset in the month of 4/2008 at 38 years  Resolved.  Comments:  12/19/2012 CST 9:46 AM CST - Peace Ibarra  Right thigh.  Irregular Heartbeat (427.9): Onset on 4/17/2006 at 36 years.  Resolved.  Comments:  12/19/2012 CST 9:55 AM Peace Qureshi  Holter monitor placed.   Family History:    Carpal Tunnel Syndrome  Mother  Asthma  Grandfather (P)  Daughter (Jose)  Hypertension  Mother  Heart disease  Father  CA - Cancer  Mother  Comments:  1/12/2016 3:42 PM - Angeline Kong  skin  Hypercholesterolemia  Mother  Parkinson disease  Mother  HTN - Hypertension  Father  Back  Mother  Father  Prostate cancer..  Uncle  Comments:  12/19/2012 9:23 AM - Peace Ibarra  Prostate cancer in his 60s.  ASHD - Atherosclerotic heart disease  Mother     Procedure history:    Cardiac computed tomography for calcium scoring (SNOMED CT 2148902746) on 1/24/2017 at 47 Years.  Comments:  2/2/2017 12:14 PM - Adriane Hayden  Total Agatston calcium score is 18  Vasectomy (SNOMED CT 50725832) on 2/20/2009 at 39 Years.  Polysomnogram (SNOMED CT 536009108) on 4/21/2008 at 38 Years.  Comments:  12/19/2012 10:00 AM - Peace Ibarra  Normal sleep architecture.  No evidence of obstructive sleep apnea.  Wrist injection (SNOMED CT 570384731) on 8/17/2007 at 37 Years.  Comments:  12/19/2012 9:50 AM - Peace Ibarra  Marcaine and Celestone injection, left wrist, for carpal tunnel symptoms.  Holter monitor (SNOMED CT 987751650) performed by Britton Pepe MD on 4/20/2006 at 36 Years.  Comments:  12/19/2012 10:03 AM - Peace Ibarra  Single 7-beat episode of asymptomatic slow atrial tachycardia with a rate of 102.  No other significant dysrhythmia.  Single episode of patient symptoms without dysrhythmia.  Cellulitis (SNOMED CT 8808595766) in 2005 at 36  Years.  Stress test ECG - treadmill (SNOMED CT 250134478) on 6/7/2005 at 35 Years.  Comments:  12/19/2012 10:06 AM - Peace Ibarra  Performed at [facility] for non-exertional chest discomfort.  Adenoidectomy (SNOMED CT 293337831).  Tonsillectomy (SNOMED CT 548512771).  Lymph node removal from axilla.   Social History:        Alcohol Assessment            Current, 1 drink, Daily      Tobacco Assessment            Never      Substance Abuse Assessment            Never      Employment and Education Assessment            Employed, Work/School description: .      Home and Environment Assessment            Marital status: .  Spouse/Partner name: Mera.  3 children.      Nutrition and Health Assessment            Type of diet: Regular.      Exercise and Physical Activity Assessment            Exercise frequency: 2-3 times per wk..  Exercise type: Karate 2x per week  Stretching 5x per week.        Physical Examination   Vital Signs   2/16/2017 12:17 PM CST Temperature Tympanic 98.5 DegF    Peripheral Pulse Rate 82 bpm    Systolic Blood Pressure 126 mmHg    Diastolic Blood Pressure 79 mmHg    Mean Arterial Pressure 95 mmHg      Measurements from flowsheet : Measurements   2/16/2017 12:17 PM CST   Weight Measured - Standard                278.2 lb     General:  Alert and oriented, No acute distress.       Review / Management   Results review:  Lab results   1/5/2017 8:12 AM CST Sodium Level 140 mmol/L    Potassium Level 4.6 mmol/L    Chloride Level 105 mmol/L    CO2 Level 28 mmol/L    Glucose Level 113 mg/dL  HI    BUN 17 mg/dL    Creatinine 1.04 mg/dL    BUN/Creat Ratio NOT APPLICABLE    eGFR 85 mL/min/1.73m2    eGFR African American 98 mL/min/1.73m2    Calcium Level 9.2 mg/dL    Cholesterol 196 mg/dL    Non-  HI    HDL 29 mg/dL  LOW    Chol/HDL Ratio 6.8  HI        Triglyceride 243 mg/dL  HI   11/9/2016 3:34 PM CST Protein Total 7.7 gm/dL    Albumin Level 4.7 gm/dL    Hgb A1c 5.8   HI    Vitamin B12 Level 504 pg/mL    Immunofixation An IgG (lambda) monoclonal immunoglobulin is detected.    Alpha 1 0.3 gm/dL    Alpha 2 0.6 gm/dL    Beta 1 0.5 gm/dL    Beta 2 0.4 gm/dL    Gamma 1.3 gm/dL    Abnormal Band 0.7 gm/dL  HI    Protein Electrophoresis Interp INTERPRETATION   .       Impression and Plan   Diagnosis     Elevated lipids (FOQ24-QM E78.5).     Course:  Not progressing as expected.    Plan:  reviewed ct will start statin,  15 min spent Face-to-face   .    Patient Instructions:       Counseled: Patient, Regarding diagnosis, Regarding treatment, Regarding medications.   Statement Selected

## 2023-05-10 DIAGNOSIS — E78.5 HYPERLIPIDEMIA, UNSPECIFIED: ICD-10-CM

## 2023-05-10 DIAGNOSIS — E11.9 TYPE 2 DIABETES MELLITUS WITHOUT COMPLICATIONS: ICD-10-CM

## 2023-05-10 DIAGNOSIS — I50.9 HEART FAILURE, UNSPECIFIED: ICD-10-CM

## 2023-05-10 DIAGNOSIS — I10 ESSENTIAL (PRIMARY) HYPERTENSION: ICD-10-CM

## 2023-05-10 DIAGNOSIS — R74.01 ELEVATION OF LEVELS OF LIVER TRANSAMINASE LEVELS: ICD-10-CM

## 2023-05-10 DIAGNOSIS — J18.9 PNEUMONIA, UNSPECIFIED ORGANISM: ICD-10-CM

## 2023-05-10 LAB
A1C WITH ESTIMATED AVERAGE GLUCOSE RESULT: 6.1 % — HIGH (ref 4–5.6)
ALBUMIN SERPL ELPH-MCNC: 3.3 G/DL — SIGNIFICANT CHANGE UP (ref 3.3–5)
ALBUMIN SERPL ELPH-MCNC: 3.5 G/DL — SIGNIFICANT CHANGE UP (ref 3.3–5)
ALBUMIN SERPL ELPH-MCNC: 3.6 G/DL — SIGNIFICANT CHANGE UP (ref 3.3–5)
ALP SERPL-CCNC: 103 U/L — SIGNIFICANT CHANGE UP (ref 40–120)
ALP SERPL-CCNC: 96 U/L — SIGNIFICANT CHANGE UP (ref 40–120)
ALP SERPL-CCNC: 98 U/L — SIGNIFICANT CHANGE UP (ref 40–120)
ALT FLD-CCNC: 366 U/L — HIGH (ref 10–45)
ALT FLD-CCNC: 378 U/L — HIGH (ref 10–45)
ALT FLD-CCNC: 416 U/L — HIGH (ref 10–45)
ANION GAP SERPL CALC-SCNC: 11 MMOL/L — SIGNIFICANT CHANGE UP (ref 5–17)
ANION GAP SERPL CALC-SCNC: 13 MMOL/L — SIGNIFICANT CHANGE UP (ref 5–17)
ANION GAP SERPL CALC-SCNC: 13 MMOL/L — SIGNIFICANT CHANGE UP (ref 5–17)
APTT BLD: 29 SEC — SIGNIFICANT CHANGE UP (ref 27.5–35.5)
AST SERPL-CCNC: 210 U/L — HIGH (ref 10–40)
AST SERPL-CCNC: 219 U/L — HIGH (ref 10–40)
AST SERPL-CCNC: 234 U/L — HIGH (ref 10–40)
BILIRUB DIRECT SERPL-MCNC: 0.8 MG/DL — HIGH (ref 0–0.3)
BILIRUB INDIRECT FLD-MCNC: 1.1 MG/DL — HIGH (ref 0.2–1)
BILIRUB SERPL-MCNC: 1.8 MG/DL — HIGH (ref 0.2–1.2)
BILIRUB SERPL-MCNC: 1.9 MG/DL — HIGH (ref 0.2–1.2)
BILIRUB SERPL-MCNC: 2.3 MG/DL — HIGH (ref 0.2–1.2)
BUN SERPL-MCNC: 35 MG/DL — HIGH (ref 7–23)
BUN SERPL-MCNC: 37 MG/DL — HIGH (ref 7–23)
BUN SERPL-MCNC: 39 MG/DL — HIGH (ref 7–23)
CALCIUM SERPL-MCNC: 8.7 MG/DL — SIGNIFICANT CHANGE UP (ref 8.4–10.5)
CALCIUM SERPL-MCNC: 8.8 MG/DL — SIGNIFICANT CHANGE UP (ref 8.4–10.5)
CALCIUM SERPL-MCNC: 9 MG/DL — SIGNIFICANT CHANGE UP (ref 8.4–10.5)
CHLORIDE SERPL-SCNC: 94 MMOL/L — LOW (ref 96–108)
CHLORIDE SERPL-SCNC: 96 MMOL/L — SIGNIFICANT CHANGE UP (ref 96–108)
CHLORIDE SERPL-SCNC: 96 MMOL/L — SIGNIFICANT CHANGE UP (ref 96–108)
CHOLEST SERPL-MCNC: 73 MG/DL — SIGNIFICANT CHANGE UP
CO2 SERPL-SCNC: 30 MMOL/L — SIGNIFICANT CHANGE UP (ref 22–31)
CO2 SERPL-SCNC: 31 MMOL/L — SIGNIFICANT CHANGE UP (ref 22–31)
CO2 SERPL-SCNC: 32 MMOL/L — HIGH (ref 22–31)
CREAT SERPL-MCNC: 0.94 MG/DL — SIGNIFICANT CHANGE UP (ref 0.5–1.3)
CREAT SERPL-MCNC: 0.98 MG/DL — SIGNIFICANT CHANGE UP (ref 0.5–1.3)
CREAT SERPL-MCNC: 0.99 MG/DL — SIGNIFICANT CHANGE UP (ref 0.5–1.3)
CRP SERPL-MCNC: 49 MG/L — HIGH (ref 0–4)
EGFR: 78 ML/MIN/1.73M2 — SIGNIFICANT CHANGE UP
EGFR: 79 ML/MIN/1.73M2 — SIGNIFICANT CHANGE UP
EGFR: 83 ML/MIN/1.73M2 — SIGNIFICANT CHANGE UP
ERYTHROCYTE [SEDIMENTATION RATE] IN BLOOD: 2 MM/HR — SIGNIFICANT CHANGE UP
ESTIMATED AVERAGE GLUCOSE: 128 MG/DL — HIGH (ref 68–114)
GLUCOSE BLDC GLUCOMTR-MCNC: 141 MG/DL — HIGH (ref 70–99)
GLUCOSE BLDC GLUCOMTR-MCNC: 143 MG/DL — HIGH (ref 70–99)
GLUCOSE BLDC GLUCOMTR-MCNC: 164 MG/DL — HIGH (ref 70–99)
GLUCOSE BLDC GLUCOMTR-MCNC: 177 MG/DL — HIGH (ref 70–99)
GLUCOSE SERPL-MCNC: 135 MG/DL — HIGH (ref 70–99)
GLUCOSE SERPL-MCNC: 147 MG/DL — HIGH (ref 70–99)
GLUCOSE SERPL-MCNC: 183 MG/DL — HIGH (ref 70–99)
HAV IGM SER-ACNC: SIGNIFICANT CHANGE UP
HBV CORE AB SER-ACNC: SIGNIFICANT CHANGE UP
HBV CORE IGM SER-ACNC: SIGNIFICANT CHANGE UP
HBV SURFACE AB SER-ACNC: SIGNIFICANT CHANGE UP
HBV SURFACE AG SER-ACNC: SIGNIFICANT CHANGE UP
HCT VFR BLD CALC: 43.8 % — SIGNIFICANT CHANGE UP (ref 39–50)
HDLC SERPL-MCNC: 24 MG/DL — LOW
HGB BLD-MCNC: 14.1 G/DL — SIGNIFICANT CHANGE UP (ref 13–17)
INR BLD: 1.53 — HIGH (ref 0.88–1.16)
LACTATE SERPL-SCNC: 1.8 MMOL/L — SIGNIFICANT CHANGE UP (ref 0.5–2)
LIPID PNL WITH DIRECT LDL SERPL: 36 MG/DL — SIGNIFICANT CHANGE UP
MAGNESIUM SERPL-MCNC: 1.8 MG/DL — SIGNIFICANT CHANGE UP (ref 1.6–2.6)
MCHC RBC-ENTMCNC: 32.2 GM/DL — SIGNIFICANT CHANGE UP (ref 32–36)
MCHC RBC-ENTMCNC: 32.6 PG — SIGNIFICANT CHANGE UP (ref 27–34)
MCV RBC AUTO: 101.2 FL — HIGH (ref 80–100)
NON HDL CHOLESTEROL: 49 MG/DL — SIGNIFICANT CHANGE UP
NRBC # BLD: 0 /100 WBCS — SIGNIFICANT CHANGE UP (ref 0–0)
PHOSPHATE SERPL-MCNC: 2.6 MG/DL — SIGNIFICANT CHANGE UP (ref 2.5–4.5)
PLATELET # BLD AUTO: 156 K/UL — SIGNIFICANT CHANGE UP (ref 150–400)
POTASSIUM SERPL-MCNC: 3 MMOL/L — LOW (ref 3.5–5.3)
POTASSIUM SERPL-MCNC: 3.5 MMOL/L — SIGNIFICANT CHANGE UP (ref 3.5–5.3)
POTASSIUM SERPL-MCNC: 3.8 MMOL/L — SIGNIFICANT CHANGE UP (ref 3.5–5.3)
POTASSIUM SERPL-SCNC: 3 MMOL/L — LOW (ref 3.5–5.3)
POTASSIUM SERPL-SCNC: 3.5 MMOL/L — SIGNIFICANT CHANGE UP (ref 3.5–5.3)
POTASSIUM SERPL-SCNC: 3.8 MMOL/L — SIGNIFICANT CHANGE UP (ref 3.5–5.3)
PROCALCITONIN SERPL-MCNC: 0.36 NG/ML — HIGH (ref 0.02–0.1)
PROT SERPL-MCNC: 5.9 G/DL — LOW (ref 6–8.3)
PROT SERPL-MCNC: 6.2 G/DL — SIGNIFICANT CHANGE UP (ref 6–8.3)
PROT SERPL-MCNC: 6.2 G/DL — SIGNIFICANT CHANGE UP (ref 6–8.3)
PROTHROM AB SERPL-ACNC: 18.3 SEC — HIGH (ref 10.5–13.4)
RAPID RVP RESULT: SIGNIFICANT CHANGE UP
RBC # BLD: 4.33 M/UL — SIGNIFICANT CHANGE UP (ref 4.2–5.8)
RBC # FLD: 14.9 % — HIGH (ref 10.3–14.5)
S PNEUM AG UR QL: NEGATIVE — SIGNIFICANT CHANGE UP
SARS-COV-2 RNA SPEC QL NAA+PROBE: SIGNIFICANT CHANGE UP
SODIUM SERPL-SCNC: 138 MMOL/L — SIGNIFICANT CHANGE UP (ref 135–145)
SODIUM SERPL-SCNC: 139 MMOL/L — SIGNIFICANT CHANGE UP (ref 135–145)
SODIUM SERPL-SCNC: 139 MMOL/L — SIGNIFICANT CHANGE UP (ref 135–145)
T4 AB SER-ACNC: 7.42 UG/DL — SIGNIFICANT CHANGE UP (ref 4.5–11.7)
TRIGL SERPL-MCNC: 63 MG/DL — SIGNIFICANT CHANGE UP
TROPONIN T SERPL-MCNC: 0.04 NG/ML — CRITICAL HIGH (ref 0–0.01)
TSH SERPL-MCNC: 1.99 UIU/ML — SIGNIFICANT CHANGE UP (ref 0.27–4.2)
WBC # BLD: 12.57 K/UL — HIGH (ref 3.8–10.5)
WBC # FLD AUTO: 12.57 K/UL — HIGH (ref 3.8–10.5)

## 2023-05-10 PROCEDURE — 99223 1ST HOSP IP/OBS HIGH 75: CPT

## 2023-05-10 PROCEDURE — 76705 ECHO EXAM OF ABDOMEN: CPT | Mod: 26

## 2023-05-10 PROCEDURE — 99222 1ST HOSP IP/OBS MODERATE 55: CPT

## 2023-05-10 RX ORDER — SODIUM CHLORIDE 9 MG/ML
1000 INJECTION, SOLUTION INTRAVENOUS
Refills: 0 | Status: DISCONTINUED | OUTPATIENT
Start: 2023-05-10 | End: 2023-05-10

## 2023-05-10 RX ORDER — MAGNESIUM SULFATE 500 MG/ML
2 VIAL (ML) INJECTION ONCE
Refills: 0 | Status: COMPLETED | OUTPATIENT
Start: 2023-05-10 | End: 2023-05-10

## 2023-05-10 RX ORDER — INSULIN LISPRO 100/ML
VIAL (ML) SUBCUTANEOUS
Refills: 0 | Status: DISCONTINUED | OUTPATIENT
Start: 2023-05-10 | End: 2023-05-10

## 2023-05-10 RX ORDER — DEXTROSE 50 % IN WATER 50 %
12.5 SYRINGE (ML) INTRAVENOUS ONCE
Refills: 0 | Status: DISCONTINUED | OUTPATIENT
Start: 2023-05-10 | End: 2023-05-10

## 2023-05-10 RX ORDER — POTASSIUM CHLORIDE 20 MEQ
20 PACKET (EA) ORAL ONCE
Refills: 0 | Status: DISCONTINUED | OUTPATIENT
Start: 2023-05-10 | End: 2023-05-10

## 2023-05-10 RX ORDER — DEXTROSE 50 % IN WATER 50 %
15 SYRINGE (ML) INTRAVENOUS ONCE
Refills: 0 | Status: DISCONTINUED | OUTPATIENT
Start: 2023-05-10 | End: 2023-05-10

## 2023-05-10 RX ORDER — GLUCAGON INJECTION, SOLUTION 0.5 MG/.1ML
1 INJECTION, SOLUTION SUBCUTANEOUS ONCE
Refills: 0 | Status: DISCONTINUED | OUTPATIENT
Start: 2023-05-10 | End: 2023-05-10

## 2023-05-10 RX ORDER — INSULIN LISPRO 100/ML
VIAL (ML) SUBCUTANEOUS
Refills: 0 | Status: DISCONTINUED | OUTPATIENT
Start: 2023-05-10 | End: 2023-05-17

## 2023-05-10 RX ORDER — CEFTRIAXONE 500 MG/1
1000 INJECTION, POWDER, FOR SOLUTION INTRAMUSCULAR; INTRAVENOUS EVERY 24 HOURS
Refills: 0 | Status: COMPLETED | OUTPATIENT
Start: 2023-05-10 | End: 2023-05-14

## 2023-05-10 RX ORDER — INSULIN LISPRO 100/ML
VIAL (ML) SUBCUTANEOUS AT BEDTIME
Refills: 0 | Status: DISCONTINUED | OUTPATIENT
Start: 2023-05-10 | End: 2023-05-10

## 2023-05-10 RX ORDER — DEXTROSE 50 % IN WATER 50 %
25 SYRINGE (ML) INTRAVENOUS ONCE
Refills: 0 | Status: DISCONTINUED | OUTPATIENT
Start: 2023-05-10 | End: 2023-05-10

## 2023-05-10 RX ORDER — POTASSIUM CHLORIDE 20 MEQ
40 PACKET (EA) ORAL EVERY 4 HOURS
Refills: 0 | Status: COMPLETED | OUTPATIENT
Start: 2023-05-10 | End: 2023-05-10

## 2023-05-10 RX ORDER — ONDANSETRON 8 MG/1
4 TABLET, FILM COATED ORAL ONCE
Refills: 0 | Status: COMPLETED | OUTPATIENT
Start: 2023-05-10 | End: 2023-05-10

## 2023-05-10 RX ORDER — AZITHROMYCIN 500 MG/1
500 TABLET, FILM COATED ORAL DAILY
Refills: 0 | Status: COMPLETED | OUTPATIENT
Start: 2023-05-10 | End: 2023-05-14

## 2023-05-10 RX ORDER — IPRATROPIUM BROMIDE 0.2 MG/ML
500 SOLUTION, NON-ORAL INHALATION EVERY 6 HOURS
Refills: 0 | Status: DISCONTINUED | OUTPATIENT
Start: 2023-05-10 | End: 2023-05-17

## 2023-05-10 RX ORDER — FUROSEMIDE 40 MG
40 TABLET ORAL
Refills: 0 | Status: DISCONTINUED | OUTPATIENT
Start: 2023-05-10 | End: 2023-05-12

## 2023-05-10 RX ORDER — ACETAMINOPHEN 500 MG
650 TABLET ORAL EVERY 6 HOURS
Refills: 0 | Status: DISCONTINUED | OUTPATIENT
Start: 2023-05-10 | End: 2023-05-11

## 2023-05-10 RX ORDER — FUROSEMIDE 40 MG
20 TABLET ORAL
Refills: 0 | Status: DISCONTINUED | OUTPATIENT
Start: 2023-05-10 | End: 2023-05-10

## 2023-05-10 RX ORDER — FUROSEMIDE 40 MG
20 TABLET ORAL ONCE
Refills: 0 | Status: DISCONTINUED | OUTPATIENT
Start: 2023-05-10 | End: 2023-05-10

## 2023-05-10 RX ORDER — HEPARIN SODIUM 5000 [USP'U]/ML
5000 INJECTION INTRAVENOUS; SUBCUTANEOUS EVERY 8 HOURS
Refills: 0 | Status: DISCONTINUED | OUTPATIENT
Start: 2023-05-10 | End: 2023-05-10

## 2023-05-10 RX ORDER — ENOXAPARIN SODIUM 100 MG/ML
40 INJECTION SUBCUTANEOUS EVERY 24 HOURS
Refills: 0 | Status: DISCONTINUED | OUTPATIENT
Start: 2023-05-10 | End: 2023-05-15

## 2023-05-10 RX ADMIN — HEPARIN SODIUM 5000 UNIT(S): 5000 INJECTION INTRAVENOUS; SUBCUTANEOUS at 07:34

## 2023-05-10 RX ADMIN — ONDANSETRON 4 MILLIGRAM(S): 8 TABLET, FILM COATED ORAL at 21:22

## 2023-05-10 RX ADMIN — Medication 40 MILLIEQUIVALENT(S): at 10:23

## 2023-05-10 RX ADMIN — Medication 20 MILLIGRAM(S): at 10:23

## 2023-05-10 RX ADMIN — Medication 40 MILLIGRAM(S): at 18:27

## 2023-05-10 RX ADMIN — LISINOPRIL 10 MILLIGRAM(S): 2.5 TABLET ORAL at 00:03

## 2023-05-10 RX ADMIN — AZITHROMYCIN 500 MILLIGRAM(S): 500 TABLET, FILM COATED ORAL at 10:23

## 2023-05-10 RX ADMIN — Medication 1: at 12:24

## 2023-05-10 RX ADMIN — CEFTRIAXONE 100 MILLIGRAM(S): 500 INJECTION, POWDER, FOR SOLUTION INTRAMUSCULAR; INTRAVENOUS at 10:23

## 2023-05-10 RX ADMIN — Medication 25 GRAM(S): at 12:25

## 2023-05-10 RX ADMIN — Medication 40 MILLIEQUIVALENT(S): at 14:51

## 2023-05-10 RX ADMIN — ENOXAPARIN SODIUM 40 MILLIGRAM(S): 100 INJECTION SUBCUTANEOUS at 14:51

## 2023-05-10 RX ADMIN — Medication 30 MILLILITER(S): at 18:08

## 2023-05-10 RX ADMIN — Medication 40 MILLIEQUIVALENT(S): at 18:08

## 2023-05-10 RX ADMIN — Medication 100 MILLIEQUIVALENT(S): at 00:02

## 2023-05-10 RX ADMIN — Medication 1: at 18:09

## 2023-05-10 NOTE — PROGRESS NOTE ADULT - NS ATTEND AMEND GEN_ALL_CORE FT
Initial attending contact date 5/10/23     . See PA note written above for details. I reviewed the PA documentation. I have personally seen and examined this patient. I reviewed vitals, labs, medications, cardiac studies, and additional imaging. I agree with the above PA's findings and plans as written above with the following additions/statements.    77M, former smoker, h/o medication non compliance (follows holistic medicine only) and no medical f/u, w/ PMHx of HTN, DM-II, CHF (EF unknown, dx 2021 @ [facility]), s/p R hip surgery 2021 and Depression admitted with HF exacerbation (unknown EF) in setting of possible CAP     -Pt reports 5 month hx of CARTAGENA as well as mild chest pressure with exertion. Also with abd fullness  -Fluid overloaded on exam with BNP 20692. EKG RBBB  -Cont lasix 20 mg IV bid, increase to 40 IV bid if with suboptimal urine output  -ECHO pending, CHF measures, Strict Is and Os   -Given possible infiltrate on CXR,elevated procal plan to cont with abx coverage for CAP  -Viral panel pending   -Appreciate medicine input

## 2023-05-10 NOTE — CONSULT NOTE ADULT - SUBJECTIVE AND OBJECTIVE BOX
77-year-old male with a PMHx of HTN, DMII, former alcohol/tobacco abuse, depression and CHF who presented with orthopnea and Yusuf concerning for acute on chronic heart failure exacerbation.  Admitted to cardiology for further management.  Medicine to follow as co-management.      Today, patient is overall doing well with his most notable complaint of an "upset stomach" with associated nausea but no vomiting or diarrhea. Last bowel movement was yesterday and it was semi-formed.  He feels his breathing is overall improved since admission.  Denies HA, CP, palpitations, fevers, chills or diarrhea.          PMHx: as per HPI          PSHx: right hip surgery           FHx:           SHx: former tobacco and alcohol abuse, denies illicit drug use           Allergies: NKDA          Home Medications: none  77-year-old male with a PMHx of HTN, DMII, former alcohol/tobacco abuse, depression and CHF who presented with orthopnea and Yusuf concerning for acute on chronic heart failure exacerbation.  Admitted to cardiology for further management.  Medicine to follow as co-management.      Today, patient is overall doing well with his most notable complaint of an "upset stomach" with associated nausea but no vomiting or diarrhea. Last bowel movement was yesterday and it was reportedly semi-formed.  He feels his breathing is overall improved since admission.  Denies HA, CP, palpitations, fevers, chills or diarrhea.     PMHx: as per HPI     PSHx: right hip surgery      FHx: non-contributory       SHx: former tobacco and alcohol abuse (quit 30+ years ago), denies illicit drug use      Allergies: NKDA     Home Medications: non-compliant with any prescription medications    Objective:  Vital Signs:  T(C): 36.7 (10 May 2023 10:39), Max: 36.7 (10 May 2023 05:36)  T(F): 98 (10 May 2023 10:39), Max: 98 (10 May 2023 05:36)  HR: 88 (10 May 2023 11:40) (87 - 97)  BP: 128/89 (10 May 2023 11:40) (128/89 - 160/95)  BP(mean): 135 (10 May 2023 11:40) (110 - 135)  RR: 18 (10 May 2023 11:40) (16 - 20)  SpO2: 94% (10 May 2023 11:40) (92% - 98%)    Parameters below as of 10 May 2023 11:40  Patient On (Oxygen Delivery Method): room air    Physical Exam:  -Gen: NAD, resting in bed  -HEENT: EOMI, PERRL, no scleral icterus, no JVD  -CV: normal S1 and S2, no murmurs appreciated   -Lungs: coarse breath sounds, no wheezing, normal respiratory effort on NC  -Ab: soft, NT, ND, normal BS  -Ext: no LE edema  -Neuro: A&O x 3, no focal deficits     Labs:                        14.1   12.57 )-----------( 156      ( 10 May 2023 08:21 )             43.8         05-10    139  |  96  |  37<H>  ----------------------------<  147<H>  3.0<L>   |  32<H>  |  0.99    Ca    8.8      10 May 2023 08:21  Phos  2.6     05-10  Mg     1.8     05-10    TPro  5.9<L>  /  Alb  3.3  /  TBili  1.9<H>  /  DBili  0.8<H>  /  AST  210<H>  /  ALT  366<H>  /  AlkPhos  96  05-10    Medications:  MEDICATIONS  (STANDING):  azithromycin   Tablet 500 milliGRAM(s) Oral daily  cefTRIAXone   IVPB 1000 milliGRAM(s) IV Intermittent every 24 hours  enoxaparin Injectable 80 milliGRAM(s) SubCutaneous every 24 hours  furosemide   Injectable 20 milliGRAM(s) IV Push two times a day  insulin lispro (ADMELOG) corrective regimen sliding scale   SubCutaneous Before meals and at bedtime  potassium chloride    Tablet ER 40 milliEquivalent(s) Oral every 4 hours    MEDICATIONS  (PRN):  acetaminophen     Tablet .. 650 milliGRAM(s) Oral every 6 hours PRN Mild Pain (1 - 3)  ipratropium    for Nebulization 500 MICROGram(s) Nebulizer every 6 hours PRN Shortness of Breath and/or Wheezing

## 2023-05-10 NOTE — H&P ADULT - NSHPLABSRESULTS_GEN_ALL_CORE
14.5   14.56 )-----------( 172      ( 09 May 2023 19:24 )             43.5       05-10    138  |  94<L>  |  39<H>  ----------------------------<  135<H>  3.8   |  31  |  0.94    Ca    8.7      10 May 2023 00:02  Mg     1.8     05-09    TPro  6.2  /  Alb  3.5  /  TBili  2.3<H>  /  DBili  x   /  AST  219<H>  /  ALT  378<H>  /  AlkPhos  98  05-10      PT/INR - ( 09 May 2023 19:24 )   PT: 20.5 sec;   INR: 1.71          PTT - ( 09 May 2023 19:24 )  PTT:27.8 sec    CARDIAC MARKERS ( 09 May 2023 19:24 )  x     / 0.04 ng/mL / 179 U/L / x     / 5.0 ng/mL

## 2023-05-10 NOTE — PROGRESS NOTE ADULT - PROBLEM SELECTOR PLAN 1
volume overloaded, warm and HD stable, SpO2 96% RA  -BNP 22K and CTA Chest w/ B/L pleural effusion (R>L)  -TTE ordered  -net neg 700ml/12H, c/w Lasix 20mg IV BID  -Core measure, daily weight, strict I&Os and 1.2L fluid restriction volume overloaded, warm and HD stable, SpO2 96% RA  -BNP 22K and CTA Chest w/ B/L pleural effusion (R>L)  -TTE ordered  -net neg 700ml/12H, c/w Lasix 20mg IV BID  -Consider starting ACE/ARB and BB  -Core measure, daily weight, strict I&Os and 1.2L fluid restriction volume overloaded, warm and HD stable, SpO2 96% RA  -BNP 22K and CTA Chest w/ B/L pleural effusion (R>L)  -TTE ordered  -net neg 700ml/12H, incr Lasix to 40mg IV BID  -Consider starting ACE/ARB and/or BB prior to d/c  -Consider ischemic w/u when euvolemic  -Core measure, daily weight, strict I&Os and 1.2L fluid restriction

## 2023-05-10 NOTE — PROGRESS NOTE ADULT - PROBLEM SELECTOR PLAN 5
A1c 6.1, prev on Metformin   -Continue mISS    F: None  E: Replete if K<4 or Mag<2  N: DASH Diet, 1.2L fluid restriction  VTEppx: Lovenox  Dispo: cardiac tele

## 2023-05-10 NOTE — CONSULT NOTE ADULT - ASSESSMENT
77-year-old male with a PMHx of HTN, DMII, former alcohol/tobacco abuse, depression and CHF who presented with acute on chronic heart failure exacerbation.       #Acute on Chronic HF Exacerbation   -further management as per cardiology    -TTE pending    -further diuresis and GDMT as per cardiology      #Leukocytosis    #Concern for CAP  -leukocytosis improved without intervention but reasonable to treat for CAP given mild elevation in procalcitonin and imaging suggestive of multifocal pneumonia   -obtain urine antigens and full RVP panel   -continue with CTX and Azithromycin (discontinue if legionella negative), plan for 5-day course     #Transaminitis   -likely congestive hepatopathy, expect improvement with diuresis  -obtain hepatitis panel and abdominal US    #Pre-Diabetes (A1c 6.1%)  -ISS for now, needs outpatient followup for continued management     DVT PPx: Lovenox     Dispo: pending clinical improvement

## 2023-05-10 NOTE — PROGRESS NOTE ADULT - SUBJECTIVE AND OBJECTIVE BOX
Cardiology PA Adult Progress Note    SUBJECTIVE ASSESSMENT:  	  MEDICATIONS:  furosemide   Injectable 20 milliGRAM(s) IV Push two times a day  azithromycin   Tablet 500 milliGRAM(s) Oral daily  cefTRIAXone   IVPB 1000 milliGRAM(s) IV Intermittent every 24 hours  ipratropium    for Nebulization 500 MICROGram(s) Nebulizer every 6 hours PRN  acetaminophen     Tablet .. 650 milliGRAM(s) Oral every 6 hours PRN  insulin lispro (ADMELOG) corrective regimen sliding scale   SubCutaneous three times a day before meals  insulin lispro (ADMELOG) corrective regimen sliding scale   SubCutaneous at bedtime  heparin   Injectable 5000 Unit(s) SubCutaneous every 8 hours  magnesium sulfate  IVPB 2 Gram(s) IV Intermittent once  potassium chloride    Tablet ER 40 milliEquivalent(s) Oral every 4 hours  	  VITAL SIGNS:  T(C): 36.7 (05-10-23 @ 05:36), Max: 36.7 (05-10-23 @ 05:36)  HR: 91 (05-10-23 @ 08:54) (87 - 97)  BP: 139/90 (05-10-23 @ 08:54) (135/83 - 160/95)  RR: 20 (05-10-23 @ 08:54) (16 - 20)  SpO2: 93% (05-10-23 @ 08:54) (92% - 98%)    Height (cm): 177.8 (05-09 @ 18:45)  Weight (kg): 86.2 (05-09 @ 18:45)  BMI (kg/m2): 27.3 (05-09 @ 18:45)  BSA (m2): 2.04 (05-09 @ 18:45)    I&O's Summary  09 May 2023 07:01  -  10 May 2023 07:00  --------------------------------------------------------  IN: 0 mL / OUT: 750 mL / NET: -750 mL                                       PHYSICAL EXAM:  Appearance: Normal	  HEENT: Normal oral mucosa, PERRL, EOMI	  Neck: Supple, + JVD/ - JVD; ___ Carotid Bruit   Cardiovascular: Normal S1 S2, No murmurs  Respiratory: Lungs clear to auscultation/Decreased Breath Sounds/No Rales, Rhonchi, Wheezing	  Gastrointestinal:  Soft, Non-tender, + BS	  Skin: No rashes, No ecchymoses, No cyanosis  Extremities: Normal range of motion, No clubbing, cyanosis or edema  Vascular: Peripheral pulses palpable 2+ bilaterally  Neurologic: Non-focal  Psychiatry: A & O x 3, Mood & affect appropriate    LABS:	                       14.1   12.57 )-----------( 156      ( 10 May 2023 08:21 )             43.8     05-10    139  |  96  |  37<H>  ----------------------------<  147<H>  3.0<L>   |  32<H>  |  0.99    Ca    8.8      10 May 2023 08:21  Phos  2.6     05-10  Mg     1.8     05-10    TPro  5.9<L>  /  Alb  3.3  /  TBili  1.9<H>  /  DBili  0.8<H>  /  AST  210<H>  /  ALT  366<H>  /  AlkPhos  96  05-10    PT/INR - ( 10 May 2023 08:21 )   PT: 18.3 sec;   INR: 1.53          PTT - ( 10 May 2023 08:21 )  PTT:29.0 sec Cardiology PA Adult Progress Note    SUBJECTIVE ASSESSMENT:  	  MEDICATIONS:  furosemide   Injectable 20 milliGRAM(s) IV Push two times a day  azithromycin   Tablet 500 milliGRAM(s) Oral daily  cefTRIAXone   IVPB 1000 milliGRAM(s) IV Intermittent every 24 hours  ipratropium    for Nebulization 500 MICROGram(s) Nebulizer every 6 hours PRN  acetaminophen     Tablet .. 650 milliGRAM(s) Oral every 6 hours PRN  insulin lispro (ADMELOG) corrective regimen sliding scale   SubCutaneous three times a day before meals  insulin lispro (ADMELOG) corrective regimen sliding scale   SubCutaneous at bedtime  heparin   Injectable 5000 Unit(s) SubCutaneous every 8 hours  magnesium sulfate  IVPB 2 Gram(s) IV Intermittent once  potassium chloride    Tablet ER 40 milliEquivalent(s) Oral every 4 hours  	  VITAL SIGNS:  T(C): 36.7 (05-10-23 @ 05:36), Max: 36.7 (05-10-23 @ 05:36)  HR: 91 (05-10-23 @ 08:54) (87 - 97)  BP: 139/90 (05-10-23 @ 08:54) (135/83 - 160/95)  RR: 20 (05-10-23 @ 08:54) (16 - 20)  SpO2: 93% (05-10-23 @ 08:54) (92% - 98%)    Height (cm): 177.8 (05-09 @ 18:45)  Weight (kg): 86.2 (05-09 @ 18:45)  BMI (kg/m2): 27.3 (05-09 @ 18:45)  BSA (m2): 2.04 (05-09 @ 18:45)    I&O's Summary  09 May 2023 07:01  -  10 May 2023 07:00  --------------------------------------------------------  IN: 0 mL / OUT: 750 mL / NET: -750 mL                                       PHYSICAL EXAM:  Appearance: Normal	  HEENT: Normal oral mucosa, PERRL, EOMI	  Neck: Supple, + JVD/ - JVD; ___ Carotid Bruit   Cardiovascular: Normal S1 S2, No murmurs  Respiratory: Lungs clear to auscultation/Decreased Breath Sounds/No Rales, Rhonchi, Wheezing	  Gastrointestinal:  Soft, Non-tender, + BS	  Skin: No rashes, No ecchymoses, No cyanosis  Extremities: Normal range of motion, No clubbing, cyanosis or edema  Vascular: Peripheral pulses palpable 2+ bilaterally  Neurologic: Non-focal  Psychiatry: A & O x 3, Mood & affect appropriate    LABS:	                       14.1   12.57 )-----------( 156      ( 10 May 2023 08:21 )             43.8     05-10    139  |  96  |  37<H>  ----------------------------<  147<H>  3.0<L>   |  32<H>  |  0.99    Ca    8.8      10 May 2023 08:21  Phos  2.6     05-10  Mg     1.8     05-10    TPro  5.9<L>  /  Alb  3.3  /  TBili  1.9<H>  /  DBili  0.8<H>  /  AST  210<H>  /  ALT  366<H>  /  AlkPhos  96  05-10    PT/INR - ( 10 May 2023 08:21 )   PT: 18.3 sec;   INR: 1.53          PTT - ( 10 May 2023 08:21 )  PTT:29.0 sec     Cardiology PA Adult Progress Note    SUBJECTIVE ASSESSMENT: Pt seen and examined this AM laying comfortably in bed w/o any complaints or events overnight. He states that his breathing has improved and denies any orthopnea or PND. Pt also endorses vague dull epigastric pain x 1 week and denies any N/V/D. He also denies any exertional CP or at rest, palpitations, dizziness/lightheadedness, fatigue, N/V or abd pain.  	  MEDICATIONS:  furosemide   Injectable 20 milliGRAM(s) IV Push two times a day  azithromycin   Tablet 500 milliGRAM(s) Oral daily  cefTRIAXone   IVPB 1000 milliGRAM(s) IV Intermittent every 24 hours  ipratropium    for Nebulization 500 MICROGram(s) Nebulizer every 6 hours PRN  acetaminophen     Tablet .. 650 milliGRAM(s) Oral every 6 hours PRN  insulin lispro (ADMELOG) corrective regimen sliding scale   SubCutaneous three times a day before meals  insulin lispro (ADMELOG) corrective regimen sliding scale   SubCutaneous at bedtime  heparin   Injectable 5000 Unit(s) SubCutaneous every 8 hours  magnesium sulfate  IVPB 2 Gram(s) IV Intermittent once  potassium chloride    Tablet ER 40 milliEquivalent(s) Oral every 4 hours  	  VITAL SIGNS:  T(C): 36.7 (05-10-23 @ 05:36), Max: 36.7 (05-10-23 @ 05:36)  HR: 91 (05-10-23 @ 08:54) (87 - 97)  BP: 139/90 (05-10-23 @ 08:54) (135/83 - 160/95)  RR: 20 (05-10-23 @ 08:54) (16 - 20)  SpO2: 93% (05-10-23 @ 08:54) (92% - 98%)    Height (cm): 177.8 (05-09 @ 18:45)  Weight (kg): 86.2 (05-09 @ 18:45)  BMI (kg/m2): 27.3 (05-09 @ 18:45)  BSA (m2): 2.04 (05-09 @ 18:45)    I&O's Summary  09 May 2023 07:01  -  10 May 2023 07:00  --------------------------------------------------------  IN: 0 mL / OUT: 750 mL / NET: -750 mL                                       PHYSICAL EXAM:  Appearance: Normal	  HEENT: Normal oral mucosa, PERRL, EOMI	  Neck: Supple, + JVD/ - JVD; ___ Carotid Bruit   Cardiovascular: Normal S1 S2, No murmurs  Respiratory: Lungs clear to auscultation/Decreased Breath Sounds/No Rales, Rhonchi, Wheezing	  Gastrointestinal:  Soft, Non-tender, + BS	  Skin: No rashes, No ecchymoses, No cyanosis  Extremities: Normal range of motion, No clubbing, cyanosis or edema  Vascular: Peripheral pulses palpable 2+ bilaterally  Neurologic: Non-focal  Psychiatry: A & O x 3, Mood & affect appropriate    LABS:	                       14.1   12.57 )-----------( 156      ( 10 May 2023 08:21 )             43.8     05-10    139  |  96  |  37<H>  ----------------------------<  147<H>  3.0<L>   |  32<H>  |  0.99    Ca    8.8      10 May 2023 08:21  Phos  2.6     05-10  Mg     1.8     05-10    TPro  5.9<L>  /  Alb  3.3  /  TBili  1.9<H>  /  DBili  0.8<H>  /  AST  210<H>  /  ALT  366<H>  /  AlkPhos  96  05-10    PT/INR - ( 10 May 2023 08:21 )   PT: 18.3 sec;   INR: 1.53          PTT - ( 10 May 2023 08:21 )  PTT:29.0 sec     Cardiology PA Adult Progress Note    SUBJECTIVE ASSESSMENT: Pt seen and examined this AM laying comfortably in bed w/o any complaints or events overnight. He states that his breathing has improved and denies any orthopnea or PND. Pt also endorses vague dull epigastric pain x 1 week and denies any N/V/D. He also denies any exertional CP or at rest, palpitations, dizziness/lightheadedness, fatigue, N/V or abd pain.  	  MEDICATIONS:  furosemide   Injectable 20 milliGRAM(s) IV Push two times a day  azithromycin   Tablet 500 milliGRAM(s) Oral daily  cefTRIAXone   IVPB 1000 milliGRAM(s) IV Intermittent every 24 hours  ipratropium    for Nebulization 500 MICROGram(s) Nebulizer every 6 hours PRN  acetaminophen     Tablet .. 650 milliGRAM(s) Oral every 6 hours PRN  insulin lispro (ADMELOG) corrective regimen sliding scale   SubCutaneous three times a day before meals  insulin lispro (ADMELOG) corrective regimen sliding scale   SubCutaneous at bedtime  heparin   Injectable 5000 Unit(s) SubCutaneous every 8 hours  magnesium sulfate  IVPB 2 Gram(s) IV Intermittent once  potassium chloride    Tablet ER 40 milliEquivalent(s) Oral every 4 hours  	  VITAL SIGNS:  T(C): 36.7 (05-10-23 @ 05:36), Max: 36.7 (05-10-23 @ 05:36)  HR: 91 (05-10-23 @ 08:54) (87 - 97)  BP: 139/90 (05-10-23 @ 08:54) (135/83 - 160/95)  RR: 20 (05-10-23 @ 08:54) (16 - 20)  SpO2: 93% (05-10-23 @ 08:54) (92% - 98%)    Height (cm): 177.8 (05-09 @ 18:45)  Weight (kg): 86.2 (05-09 @ 18:45)  BMI (kg/m2): 27.3 (05-09 @ 18:45)  BSA (m2): 2.04 (05-09 @ 18:45)    I&O's Summary  09 May 2023 07:01  -  10 May 2023 07:00  --------------------------------------------------------  IN: 0 mL / OUT: 750 mL / NET: -750 mL                                       PHYSICAL EXAM:  Appearance: Normal	  HEENT: Normal oral mucosa, PERRL, EOMI	  Neck: Supple, - JVD; no Carotid Bruit   Cardiovascular: Normal S1 S2, No murmurs  Respiratory: Lungs clear to auscultation, No Rales, Rhonchi, Wheezing	  Gastrointestinal:  Soft, Non-tender, + BS	  Skin: No rashes, No ecchymoses, No cyanosis  Extremities: Normal range of motion, No clubbing, cyanosis or edema  Vascular: Peripheral pulses palpable 2+ bilaterally  Neurologic: Non-focal  Psychiatry: A & O x 3, Mood & affect appropriate    LABS:	                       14.1   12.57 )-----------( 156      ( 10 May 2023 08:21 )             43.8     05-10    139  |  96  |  37<H>  ----------------------------<  147<H>  3.0<L>   |  32<H>  |  0.99    Ca    8.8      10 May 2023 08:21  Phos  2.6     05-10  Mg     1.8     05-10    TPro  5.9<L>  /  Alb  3.3  /  TBili  1.9<H>  /  DBili  0.8<H>  /  AST  210<H>  /  ALT  366<H>  /  AlkPhos  96  05-10    PT/INR - ( 10 May 2023 08:21 )   PT: 18.3 sec;   INR: 1.53          PTT - ( 10 May 2023 08:21 )  PTT:29.0 sec     Cardiology PA Adult Progress Note    SUBJECTIVE ASSESSMENT: Pt seen and examined this AM laying comfortably in bed w/o any complaints or events overnight. He states that his breathing has improved and denies any orthopnea or PND. Pt also endorses vague dull epigastric pain x 1 week and denies any N/V/D. He also denies any exertional CP or at rest, palpitations, dizziness/lightheadedness, fatigue, N/V or abd pain.  	  MEDICATIONS:  furosemide   Injectable 20 milliGRAM(s) IV Push two times a day  azithromycin   Tablet 500 milliGRAM(s) Oral daily  cefTRIAXone   IVPB 1000 milliGRAM(s) IV Intermittent every 24 hours  ipratropium    for Nebulization 500 MICROGram(s) Nebulizer every 6 hours PRN  acetaminophen     Tablet .. 650 milliGRAM(s) Oral every 6 hours PRN  insulin lispro (ADMELOG) corrective regimen sliding scale   SubCutaneous three times a day before meals  insulin lispro (ADMELOG) corrective regimen sliding scale   SubCutaneous at bedtime  heparin   Injectable 5000 Unit(s) SubCutaneous every 8 hours  magnesium sulfate  IVPB 2 Gram(s) IV Intermittent once  potassium chloride    Tablet ER 40 milliEquivalent(s) Oral every 4 hours  	  VITAL SIGNS:  T(C): 36.7 (05-10-23 @ 05:36), Max: 36.7 (05-10-23 @ 05:36)  HR: 91 (05-10-23 @ 08:54) (87 - 97)  BP: 139/90 (05-10-23 @ 08:54) (135/83 - 160/95)  RR: 20 (05-10-23 @ 08:54) (16 - 20)  SpO2: 93% (05-10-23 @ 08:54) (92% - 98%)    Height (cm): 177.8 (05-09 @ 18:45)  Weight (kg): 86.2 (05-09 @ 18:45)  BMI (kg/m2): 27.3 (05-09 @ 18:45)  BSA (m2): 2.04 (05-09 @ 18:45)    I&O's Summary  09 May 2023 07:01  -  10 May 2023 07:00  --------------------------------------------------------  IN: 0 mL / OUT: 750 mL / NET: -750 mL                                       PHYSICAL EXAM:  Appearance: Normal	  HEENT: Normal oral mucosa, PERRL, EOMI	  Neck: Supple, - JVD; no Carotid Bruit   Cardiovascular: Normal S1 S2, No murmurs  Respiratory: scattered rales B/L	  Gastrointestinal:  Soft, Non-tender, + BS	  Skin: No rashes, No ecchymoses, No cyanosis  Extremities: Normal range of motion, No clubbing, cyanosis or edema  Vascular: Peripheral pulses palpable 2+ bilaterally  Neurologic: Non-focal  Psychiatry: A & O x 3, Mood & affect appropriate    LABS:	                       14.1   12.57 )-----------( 156      ( 10 May 2023 08:21 )             43.8     05-10    139  |  96  |  37<H>  ----------------------------<  147<H>  3.0<L>   |  32<H>  |  0.99    Ca    8.8      10 May 2023 08:21  Phos  2.6     05-10  Mg     1.8     05-10    TPro  5.9<L>  /  Alb  3.3  /  TBili  1.9<H>  /  DBili  0.8<H>  /  AST  210<H>  /  ALT  366<H>  /  AlkPhos  96  05-10    PT/INR - ( 10 May 2023 08:21 )   PT: 18.3 sec;   INR: 1.53          PTT - ( 10 May 2023 08:21 )  PTT:29.0 sec

## 2023-05-10 NOTE — PATIENT PROFILE ADULT - FALL HARM RISK - HARM RISK INTERVENTIONS
Assistance with ambulation/Assistance OOB with selected safe patient handling equipment/Communicate Risk of Fall with Harm to all staff/Discuss with provider need for PT consult/Monitor gait and stability/Provide patient with walking aids - walker, cane, crutches/Reinforce activity limits and safety measures with patient and family/Tailored Fall Risk Interventions/Use of alarms - bed, chair and/or voice tab/Visual Cue: Yellow wristband and red socks/Bed in lowest position, wheels locked, appropriate side rails in place/Call bell, personal items and telephone in reach/Instruct patient to call for assistance before getting out of bed or chair/Non-slip footwear when patient is out of bed/[place] to call system/Physically safe environment - no spills, clutter or unnecessary equipment/Purposeful Proactive Rounding/Room/bathroom lighting operational, light cord in reach

## 2023-05-10 NOTE — H&P ADULT - ASSESSMENT
77M with PMhx HTN, DM2, diagnosed with CHF at OSH in 2021 and lost to follow up, non compliant with medications presents [facility] 5/10 c/o several days of dull, intermittent SSCP with SOB and orthopnea. No LE edema.       #  acute on chronic CHF   - Trop 0.04 x1, follow up repeat  - BNP 22K, LFTs elevated  - EKG: with old RBBB, new diffuse TWI compared to prior  - CTA neg for PE, notable for small R>L bilat pleural effusions.   - TTE ordered  - consider ischemic eval once evolumic   - consult heart failure   - core measure, daily weights, strict I&O, 1.5L fluid restriction   - MEDS: lasix 40IV BID with close attention to elecytrolte repletetion    # leukocytosis  - afebrile, WBC 14  - likely iso CHF exacerbation  - CTA notable for BL GGO, cannot exclude PNA   - pancx if febrile or clinical course declines  - lactate 0.8 - WNL     # HTN  - SBP 150s  - MEDS: c/w ***    #DM2  - A1C ordered  - holding home metformin   - c/w ISS   - closely monitor FS, titrate as needed     # Depression  - MEDS: c/w home zoloft      Replete Mg>2 and K>4  Cardiac/diabetic diet/1.5L fluid restriction   SQH DVT PPX  Dispo: TBD    77M with PMhx HTN, DM2, diagnosed with CHF at OSH in 2021 and lost to follow up, non compliant with medications presents [facility] 5/10 c/o several days of dull, intermittent SSCP with SOB and orthopnea. No LE edema.       #  acute on chronic CHF   - no LE edema, +JVD, saturating on 2L NC   - Trop 0.04 x1, follow up repeat  - BNP 22K, LFTs elevated  - EKG: with old RBBB, new diffuse TWI compared to prior  - CTA neg for PE, notable for small R>L bilat pleural effusions.   - TTE ordered- EF unknown   - consider ischemic eval once evolumic   - consult heart failure GDMT initiation   - discuss diuresis   - MEDS: ordered for lasix 20IV x1 (5/10AM) with close attention to electrolyte repletetion  - core measure, daily weights, strict I&O, 1.5L fluid restriction     # leukocytosis  - afebrile, WBC 14  - likely iso CHF exacerbation  - CTA notable for BL GGO, cannot exclude PNA   - pancx if febrile or clinical course declines  - lactate 0.8 - WNL     # HTN  - SBP 150s  - ?previously taking lisinopril/HCTZ, pt reports does not take at this time    - MEDS: discuss w cards tmrw     #DM2  - A1C ordered  - ?previously taking metformin, pt reports does not take at this time    - c/w ISS   - closely monitor FS, titrate as needed     # Depression  - previously Rx zoloft, pt reports does not take at this time        Replete Mg>2 and K>4  Cardiac/diabetic diet/1.5L fluid restriction   PT: walks w walker at home   SQH DVT PPX  Dispo: TBD     77M with PMhx HTN, DM2, diagnosed with CHF at OSH in 2021 and lost to follow up, non compliant with medications presents [facility] 5/10 c/o several days of dull, intermittent SSCP with SOB and orthopnea. No LE edema.       #  acute on chronic CHF   - no LE edema, +JVD, saturating on 2L NC   - Trop 0.04 x1, follow up repeat  - BNP 22K, LFTs elevated  - EKG: with old RBBB, new diffuse TWI compared to prior  - CTA neg for PE, notable for small R>L bilat pleural effusions.   - TTE ordered- EF unknown   - consider ischemic eval once evolumic   - consult heart failure GDMT initiation   - discuss diuresis   - MEDS: ordered for lasix 20IV x1 (5/10AM) with close attention to electrolyte repletetion  - core measure, daily weights, strict I&O, 1.5L fluid restriction     # leukocytosis  - afebrile, WBC 14  - likely iso CHF exacerbation  - CTA notable for BL GGO, cannot exclude PNA   - pancx if febrile or clinical course declines  - lactate 0.8 - WNL   - UA ordered     # HTN  - SBP 150s  - ?previously taking lisinopril/HCTZ, pt reports does not take at this time    - MEDS: discuss w cards tmrw     #DM2  - A1C ordered  - ?previously taking metformin, pt reports does not take at this time    - c/w ISS   - closely monitor FS, titrate as needed     # Depression  - previously Rx zoloft, pt reports does not take at this time        Replete Mg>2 and K>4  Cardiac/diabetic diet/1.5L fluid restriction   PT: walks w walker at home   SQH DVT PPX  Dispo: TBD

## 2023-05-10 NOTE — PROGRESS NOTE ADULT - PROBLEM SELECTOR PLAN 4
SBP 140s  -Pt endorses prev on Lisinopril and HCTZ, however has not taken for many yrs  -Continue Lasix 20mg IV BID  -Consider restarting Lisinopril SBP 140s  -Pt endorses prev on Lisinopril and HCTZ, however has not taken for many yrs  -Continue Lasix 40mg IV BID  -Consider restarting Lisinopril prior to d/c i/s/o CHF

## 2023-05-10 NOTE — PROGRESS NOTE ADULT - PROBLEM SELECTOR PLAN 3
presents w/ dull abd/epigastric pain and AST//398; likely hepatic congestion i/s/o acute CHF  -AST/ALT downtrending to 210/366, continue to trend daily  -Abd US ordered  -Avoid Tylenol and hepatotoxic agents presents w/ dull abd/epigastric pain and AST//398; likely hepatic congestion i/s/o acute CHF  -AST/ALT downtrending to 210/366, continue to trend daily  -Abd US ordered  -f/u hepatits panel  -Avoid Tylenol and hepatotoxic agents presents w/ dull abd/epigastric pain and AST//398; likely hepatic congestion i/s/o acute CHF  -AST/ALT downtrending to 210/366, continue to trend daily  -Abd US ordered  -hepatitis panel negative  -Avoid Tylenol and hepatotoxic agents

## 2023-05-10 NOTE — PROGRESS NOTE ADULT - ASSESSMENT
77M, former smoker, h/o medication non compliance (holistic medicine only), w/ PMHx of HTN, HLD, DM-II, CHF (EF unknown, dx 2021 @ [facility]), s/p R hip surgery 2021 and Depressed, presents to [facility] c/o dull abd pain/CP w/ associated SOB and orthopnea, pt admitted to cardiac tele for management of acute CHF and underlying CAP. Hospital course significant for transaminitis, pending Abd US. 77M, former smoker, h/o medication non compliance (holistic medicine only) and no medical f/u, w/ PMHx of HTN, HLD, DM-II, CHF (EF unknown, dx 2021 @ [facility]), s/p R hip surgery 2021 and Depression, presents to [facility] c/o dull abd pain/CP w/ associated SOB and orthopnea, pt admitted to cardiac tele for management of acute CHF and underlying CAP. Hospital course significant for transaminitis, pending Abd US. 77M, former smoker, h/o medication non compliance (follows holistic medicine only) and no medical f/u, w/ PMHx of HTN, DM-II, CHF (EF unknown, dx 2021 @ [facility]), s/p R hip surgery 2021 and Depression, presented to [facility] c/o dull abd pain/CP w/ associated SOB and orthopnea, pt admitted to cardiac tele for management of acute CHF and underlying CAP. Hospital course significant for transaminitis, pending Abd US.

## 2023-05-10 NOTE — H&P ADULT - NSICDXPASTMEDICALHX_GEN_ALL_CORE_FT
PAST MEDICAL HISTORY:  Anxiety and depression     DM2 (diabetes mellitus, type 2)     Heart failure     HTN (hypertension)

## 2023-05-10 NOTE — PROGRESS NOTE ADULT - PROBLEM SELECTOR PLAN 2
presents afebrile, WBC 14 w/ left shift and non toxic appearing  -Pro sundeep 0.36, CRP 49, ESR wnl  -CTA Chest w/ ground glass opacities  -f/u Legionella and strep pneumo  -COVID (-), f/u RVP  -Start Azithromycin 500mg QD x 5 days and CTX 1g QD x 5 days presents w/ SOB, afebrile, WBC 14 w/ left shift and non toxic appearing  -Procal 0.36, CRP 49, ESR wnl, Lactate wnl  -CTA Chest w/ ground glass opacities, neg for PE  -f/u Legionella and strep pneumo  -COVID (-), f/u RVP  -Start Azithromycin 500mg QD x 5 days and CTX 1g QD x 5 days presents w/ SOB, afebrile, WBC 14 w/ left shift and non toxic appearing  -Procal 0.36, CRP 49, ESR wnl, Lactate wnl  -COVID/RVP (-)  -CTA Chest w/ ground glass opacities, neg for PE  -f/u Legionella and strep pneumo  -Start Azithromycin 500mg QD x 5 days and CTX 1g QD x 5 days

## 2023-05-10 NOTE — H&P ADULT - HISTORY OF PRESENT ILLNESS
AOx3, Stable, Full Code     77M with PMhx HTN, DM2, remote h/o EToH (quit 3-5yrs ago), depression (zoloft),  diagnosed with CHF at OSH in 2021 and lost to follow up, non compliant with medications presents [facility] 5/10 c/o several days of dull, intermittent SSCP with SOB and orthopnea. No LE edema.     ED workup reveals:   - Vitals: 160/95, HR 87, SpO2 95% on RA   - Workup: EKG with old RBBB, new diffuse TWI compared to prior. +edema and JVD, desat to 80s while lying down. CTA neg for PE, +bilat pleural effusions.   - Labs: K 2.9, WBC 14K, BNP 22K, LFTs elevated.   - Meds: Pt treated with lasix 20mg ivp + 40mg ivp and K repletion. BP 150s/100s treated with lisinopril.     Now admitted for further management of acute exacerbation of chronic chf. AOx3, Stable, Full Code     77M  former smoker, ( was a heavy smoker and quit 37 yrs ago) ambulates with walker  with PMhx HTN, DM2, right hip surgery one year ago ([facility])remote h/o EToH (quit 3-5yrs ago), depression (zoloft),  diagnosed with CHF at OSH in 2021 and lost to follow up, non compliant with medications presents [facility] 5/10 c/o several days of dull, intermittent SSCP with SOB and orthopnea. No LE edema.     In speaking with patient, he reports one week of SSCP, 3/10 dull discomfort with CARTAGENA minimal exertion (ambulating one block), 2+ pillow orthopnea for one week.  Pt. states he has not seen a physician for approximately 10 yrs and does not have a PCP  or Cardiologist.  According to patient,  he was last seen by a physician at [facility] before hip surgery and was told he had HF in which he was prescribed medication.  Pt. admits to not taking medication and instead took over counter vitamins because he believes in homeopathic medicine.  However, after interviewer spoke with patient regarding the importance of medical follow up,  patient agrees to start seeing a Cardiologist and  being compliant with medication.  Pt. denies fever, chills, HA, chest pain, palpitations, dizziness, diaphoresis, abdominal discomfort and n/v/d..    ED workup reveals:   - Vitals: 160/95, HR 87, SpO2 95% on RA   - Workup: EKG with old RBBB, new diffuse TWI compared to prior. +edema and JVD, desat to 80s while lying down. CTA neg for PE, +bilat pleural effusions.   - Labs: K 2.9, WBC 14K, BNP 22K, LFTs elevated.   - Meds: Pt treated with lasix 20mg ivp + 40mg ivp and K repletion. BP 150s/100s treated with lisinopril.     Now admitted for further management of acute exacerbation of chronic chf.

## 2023-05-10 NOTE — ED ADULT NURSE REASSESSMENT NOTE - NS ED NURSE REASSESS COMMENT FT1
Pt endorsed to RENETTA Garcia. Pt currently getting the last potassium IVPB. Pt given lisinopril for bp. SR on monitor. Satting 92% on 2L NC. Pt breathing even and unlabored with equal chest rise and fall. Pending admission

## 2023-05-10 NOTE — PATIENT PROFILE ADULT - NUMBER OF YRS
Dermatology Rooming Note    Panchito Pierce's goals for this visit include:   Chief Complaint   Patient presents with     Skin Check     Panchito is here today for a skin check      CLAUS Vega     24

## 2023-05-10 NOTE — H&P ADULT - NSHPPHYSICALEXAM_GEN_ALL_CORE
Gen: NAD, pleasant and conversant   Neuro: AOx3, nonfocal   HEENT: PERRLA, EOMI, normal oral mucosa  CV: RRR, +S1S2 +JVD  Pulm: decreased BS BL bases  GI: +BS, soft, NT/ND   Extremities: WWP, trace edema   Skin: No rashes or trauma noted Gen: NAD, pleasant and conversant   Neuro: AOx3, nonfocal   HEENT: , EOMI, normal oral mucosa  CV: RRR, +S1S2 +JVD  Pulm: decreased BS BL bases  GI: +BS, soft, NT/ND   Extremities: WWP, trace edema   Skin: No rashes or trauma noted

## 2023-05-11 ENCOUNTER — TRANSCRIPTION ENCOUNTER (OUTPATIENT)
Age: 78
End: 2023-05-11

## 2023-05-11 DIAGNOSIS — R10.9 UNSPECIFIED ABDOMINAL PAIN: ICD-10-CM

## 2023-05-11 LAB
ALBUMIN SERPL ELPH-MCNC: 3.3 G/DL — SIGNIFICANT CHANGE UP (ref 3.3–5)
ALP SERPL-CCNC: 92 U/L — SIGNIFICANT CHANGE UP (ref 40–120)
ALT FLD-CCNC: 455 U/L — HIGH (ref 10–45)
ANION GAP SERPL CALC-SCNC: 14 MMOL/L — SIGNIFICANT CHANGE UP (ref 5–17)
AST SERPL-CCNC: 279 U/L — HIGH (ref 10–40)
BILIRUB SERPL-MCNC: 1.9 MG/DL — HIGH (ref 0.2–1.2)
BUN SERPL-MCNC: 32 MG/DL — HIGH (ref 7–23)
CALCIUM SERPL-MCNC: 9.2 MG/DL — SIGNIFICANT CHANGE UP (ref 8.4–10.5)
CHLORIDE SERPL-SCNC: 96 MMOL/L — SIGNIFICANT CHANGE UP (ref 96–108)
CO2 SERPL-SCNC: 29 MMOL/L — SIGNIFICANT CHANGE UP (ref 22–31)
CREAT SERPL-MCNC: 0.96 MG/DL — SIGNIFICANT CHANGE UP (ref 0.5–1.3)
CRP SERPL-MCNC: 38.9 MG/L — HIGH (ref 0–4)
EGFR: 81 ML/MIN/1.73M2 — SIGNIFICANT CHANGE UP
GLUCOSE BLDC GLUCOMTR-MCNC: 132 MG/DL — HIGH (ref 70–99)
GLUCOSE BLDC GLUCOMTR-MCNC: 146 MG/DL — HIGH (ref 70–99)
GLUCOSE BLDC GLUCOMTR-MCNC: 167 MG/DL — HIGH (ref 70–99)
GLUCOSE BLDC GLUCOMTR-MCNC: 174 MG/DL — HIGH (ref 70–99)
GLUCOSE SERPL-MCNC: 127 MG/DL — HIGH (ref 70–99)
HCT VFR BLD CALC: 42.4 % — SIGNIFICANT CHANGE UP (ref 39–50)
HCV AB S/CO SERPL IA: 0.15 S/CO — SIGNIFICANT CHANGE UP (ref 0–0.99)
HCV AB SERPL-IMP: SIGNIFICANT CHANGE UP
HGB BLD-MCNC: 14 G/DL — SIGNIFICANT CHANGE UP (ref 13–17)
MAGNESIUM SERPL-MCNC: 2.2 MG/DL — SIGNIFICANT CHANGE UP (ref 1.6–2.6)
MCHC RBC-ENTMCNC: 33 GM/DL — SIGNIFICANT CHANGE UP (ref 32–36)
MCHC RBC-ENTMCNC: 33.3 PG — SIGNIFICANT CHANGE UP (ref 27–34)
MCV RBC AUTO: 101 FL — HIGH (ref 80–100)
NRBC # BLD: 0 /100 WBCS — SIGNIFICANT CHANGE UP (ref 0–0)
NT-PROBNP SERPL-SCNC: HIGH PG/ML (ref 0–300)
PLATELET # BLD AUTO: 161 K/UL — SIGNIFICANT CHANGE UP (ref 150–400)
POTASSIUM SERPL-MCNC: 3.8 MMOL/L — SIGNIFICANT CHANGE UP (ref 3.5–5.3)
POTASSIUM SERPL-SCNC: 3.8 MMOL/L — SIGNIFICANT CHANGE UP (ref 3.5–5.3)
PROT SERPL-MCNC: 5.7 G/DL — LOW (ref 6–8.3)
RBC # BLD: 4.2 M/UL — SIGNIFICANT CHANGE UP (ref 4.2–5.8)
RBC # FLD: 15.1 % — HIGH (ref 10.3–14.5)
SODIUM SERPL-SCNC: 139 MMOL/L — SIGNIFICANT CHANGE UP (ref 135–145)
T3 SERPL-MCNC: 77 NG/DL — LOW (ref 80–200)
WBC # BLD: 10.64 K/UL — HIGH (ref 3.8–10.5)
WBC # FLD AUTO: 10.64 K/UL — HIGH (ref 3.8–10.5)

## 2023-05-11 PROCEDURE — 99233 SBSQ HOSP IP/OBS HIGH 50: CPT

## 2023-05-11 PROCEDURE — 71045 X-RAY EXAM CHEST 1 VIEW: CPT | Mod: 26

## 2023-05-11 RX ORDER — POTASSIUM CHLORIDE 20 MEQ
20 PACKET (EA) ORAL ONCE
Refills: 0 | Status: COMPLETED | OUTPATIENT
Start: 2023-05-11 | End: 2023-05-11

## 2023-05-11 RX ADMIN — CEFTRIAXONE 100 MILLIGRAM(S): 500 INJECTION, POWDER, FOR SOLUTION INTRAMUSCULAR; INTRAVENOUS at 10:17

## 2023-05-11 RX ADMIN — Medication 40 MILLIGRAM(S): at 07:17

## 2023-05-11 RX ADMIN — Medication 1: at 22:58

## 2023-05-11 RX ADMIN — ENOXAPARIN SODIUM 40 MILLIGRAM(S): 100 INJECTION SUBCUTANEOUS at 13:51

## 2023-05-11 RX ADMIN — Medication 40 MILLIGRAM(S): at 13:51

## 2023-05-11 RX ADMIN — Medication 20 MILLIEQUIVALENT(S): at 10:14

## 2023-05-11 RX ADMIN — AZITHROMYCIN 500 MILLIGRAM(S): 500 TABLET, FILM COATED ORAL at 10:14

## 2023-05-11 RX ADMIN — Medication 1: at 13:38

## 2023-05-11 NOTE — DISCHARGE NOTE PROVIDER - CARE PROVIDER_API CALL
Radha Casas (PA)  Physician Assistant Services  [address] [place] [address]  Phone: [phone]  Fax: [phone]  Follow Up Time:    Sam Harper)  Internal Medicine  [address] [place] [address]  Phone: [phone]  Fax: [phone]  Follow Up Time: 1 week   Sam Harper)  Internal Medicine  [address] [place] [address]  Phone: [phone]  Fax: [phone]  Scheduled Appointment: 05/24/2023 11:30 AM

## 2023-05-11 NOTE — PROGRESS NOTE ADULT - ASSESSMENT
77M, former smoker, h/o medication non compliance (follows holistic medicine only) and no medical f/u, w/ PMHx of HTN, DM-II, CHF (EF unknown, dx 2021 @ [facility]), s/p R hip surgery 2021 and Depression, presented to [facility] c/o dull abd pain/CP w/ associated SOB and orthopnea, pt admitted to cardiac tele for management of acute CHF and underlying CAP, hospital course also significant for transaminitis.

## 2023-05-11 NOTE — DISCHARGE NOTE PROVIDER - NSDCFUADDAPPT_GEN_ALL_CORE_FT
Please follow up with the heart failure specialist Dr. Harper within 1 week, his office will call you with the appointment.

## 2023-05-11 NOTE — PROGRESS NOTE ADULT - PROBLEM SELECTOR PLAN 5
-140s  -Pt endorses prev on Lisinopril and HCTZ, however has not taken for many yrs  -Continue Lasix 40mg IV BID  -Consider restarting Lisinopril prior to d/c i/s/o CHF

## 2023-05-11 NOTE — DISCHARGE NOTE PROVIDER - INSTRUCTIONS
Please continue to follow a heart healthy diet, low in sodium, cholesterol and fats.  Please limit to 1.5L of fluids per day.   We recommend a Mediterranean diet:  -Incorporate 2 or more servings per day of vegetables, fruits, and whole grains  -Increase intake of fish and legumes/beans to 2 or more servings per week  -Increase intake of healthy fats, such as olive oil and avocados, and have a handful of nuts/seeds most days  -Reduce red/processed meat consumption to 2 or fewer times per week

## 2023-05-11 NOTE — PROGRESS NOTE ADULT - PROBLEM SELECTOR PLAN 3
presents w/ dull abd/epigastric pain and AST//398; likely hepatic congestion i/s/o acute CHF  -AST/ALT stable/mild increase to 279/455 5/11, continue to trend daily  -5/11 Abd US: Enlarged steatotic liver. Trace ascites and right pleural effusion, probably related to elevated right heart pressures/cardiac decompensation  -hepatitis panel negative  -Avoid Tylenol and hepatotoxic agents    #Mild Macrocytosis noted on labs, per medicine will [ ] f/u Folate, B12 levels. H/h stable.

## 2023-05-11 NOTE — DISCHARGE NOTE PROVIDER - CARE PROVIDERS DIRECT ADDRESSES
,DirectAddress_Unknown ,[email or web address]

## 2023-05-11 NOTE — DISCHARGE NOTE PROVIDER - HOSPITAL COURSE
***INCOMPLETE***    77M, former smoker, h/o medication non compliance (follows holistic medicine only) and no medical f/u, w/ PMHx of HTN, DM-II, CHF (EF unknown, dx 2021 @ [facility]), s/p R hip surgery 2021 and Depression, presented to [facility] c/o dull abd pain/CP w/ associated SOB and orthopnea, pt admitted to cardiac tele for management of acute CHF and underlying CAP (s/p IV ABX). Hospital course also significant for transaminitis i/s/o acute CHF. Pt s/p R+[facility] 5/16 revealing normal coronaries and filling pressures, Adv HF consulted. Pt pending LION placement.       Problem/Plan - 1:  ·  Problem: Acute CHF (congestive heart failure).   ·  Plan: euvolemic, warm and HD stable, SpO2 97% RA. Advanced HF consulted  -TTE 5/12: LV dilated w/ LVEF 15-20%, G3DD, dilated RV w/ reduced RVEF, OSCAR, mod MR, mild-mod TR, PASP 39  -s/p R+C 5/16: mild luminal irregularities throughout, EDP 4, RA 2, PCWP 9, RV 20/1/14, PA Sat 66%, PA 29/14/19 AoSat 90%, CO 5.2/21, CI 2.55  -net neg 7L and s/p Lasix 40mg IV BID. IV diuretics held today, will reassess in AM  -Continue Spironolactone 25mg QD and Farxiga 10mg QD  -Increase Entresto to 49/51mg BID  -Start Toprol 25mg QD  -Stop Hydralazine and Isordil  -Core measure, daily weight, strict I&Os and 1.2L fluid restriction.     Problem/Plan - 2:  ·  Problem: CAP (community acquired pneumonia).   ·  Plan: presents w/ SOB, afebrile, WBC 14 w/ left shift and non toxic appearing; Pulm following  -Procal 0.36, CRP elevated, ESR wnl, Lactate wnl  -COVID/RVP (-), urine Legionella (-), strep pneumo (-)  -CTA Chest 05/09: w/ ground glass opacities, neg for PE  -s/p Azithro 500mg QD x 5days and CTX 1g QD x 5days abx total (5/10-5/14).     Problem/Plan - 3:  ·  Problem: Transaminitis.   ·  Plan: presents w/ dull abd/epigastric pain- now improved; likely congestive hepatopathy  -AST/ALT continues to downtrend, today 49/148  -Abd US 05/11: Enlarged steatotic liver. Trace ascites and Rt pleural effusion  -Hep panel neg  -Avoid Tylenol and hepatotoxic agents.     Problem/Plan - 4:  ·  Problem: Stomach discomfort.   ·  Plan: presented w/ vague dull abd/epigastric pain; now improved; likely 2/2 congestive hepatopathy vs. gastric related as reports frequently taking Excedrin in the past  -H pylori (-)  -Abd US 05/11: Enlarged steatotic liver. Trace ascites and Rt pleural effusion  -Continue probiotic, PPI and pepto-bismol PRN  -Avoid Excedrin/NSAID use.     77M, former smoker, h/o medication non compliance (follows holistic medicine only) and no medical f/u, w/ PMHx of HTN, DM-II, CHF (EF unknown, dx 2021 @ [facility]), s/p R hip surgery 2021 and Depression, presented to [facility] 5/9/23 c/o dull epigastric/abd pain and worsening CARTAGENA and orthopnea. In ED, pt noted to desat to 80s when laying down, labs significant for BNP 22K, AST//398, T Bili 2.5, WBC 14 w/ left shift and K 2.9. CTA Chest negative for PE but w/ B/L ground glass opacities and B/L pleural effusions. EKG SR w/ RBBB and diffuse TWI. Pt received Lasix 20mg IVP x 1 and 40mg IVP x 1. Pt was admitted to cardiac tele for management of acute CHF and underlying CAP.  Pt diuresed well on IV Lasix 40mg BID, net neg 7L and transitioned to ___. TTE revealed LV dilated w/ LVEF 15-20%, G3DD, dilated RV w/ reduced RVEF, OSCAR, mod MR, mild-mod TR, PASP 39. Pt s/p dx R+[facility] 5/16 revealing normal coronaries w/ mild luminal irregularities throughout, and normal filling pressures (RA 2, PCWP 9, RV 20/1/14, PA Sat 66%, PA 29/14/19 AoSat 90%, CO 5.2/21, CI 2.55). Adv HF consulted and pt started on _____ for GDMT. With continued diuresis, pts transaminitis continued to downtrend prior to dc and abd US revealed Enlarged steatotic liver. Trace ascites and Rt pleural effusion. Pt also s/p 5 day course of Azithromycin 500mg QD and CTX 1g QD for CAP. PT consulted and pt recommended for LION.    Pt seen and examined at bedside this AM without any complaints or events overnight, VSS, labs and telemetry reviewed and pt stable for discharge as discussed with Dr. Vallejo. Pt has received appropriate discharge instructions, including medication regimen, access site management and follow up with  __ in 1-2 weeks.    Discharge medications:    77M, former smoker, h/o medication non compliance (follows holistic medicine only) and no medical f/u, w/ PMHx of HTN, DM-II, CHF (EF unknown, dx 2021 @ [facility]), s/p R hip surgery 2021 and Depression, presented to [facility] 5/9/23 c/o dull epigastric/abd pain and worsening CARTAGENA and orthopnea. In ED, pt noted to desat to 80s when laying down, labs significant for BNP 22K, AST//398, T Bili 2.5, WBC 14 w/ left shift and K 2.9. CTA Chest negative for PE but w/ B/L ground glass opacities and B/L pleural effusions. EKG SR w/ RBBB and diffuse TWI. Pt received Lasix 20mg IVP x 1 and 40mg IVP x 1. Pt was admitted to cardiac tele for management of acute CHF and underlying CAP.  Pt diuresed well on IV Lasix 40mg BID, net neg 7L and transitioned to Lasix 40mg PO QD. TTE revealed LV dilated w/ LVEF 15-20%, G3DD, dilated RV w/ reduced RVEF, OSCAR, mod MR, mild-mod TR, PASP 39. Pt s/p dx R+[facility] 5/16 revealing normal coronaries w/ mild luminal irregularities throughout, and normal filling pressures (RA 2, PCWP 9, RV 20/1/14, PA Sat 66%, PA 29/14/19 AoSat 90%, CO 5.2/21, CI 2.55). Given that pt currently DOES NOT have prescription coverage and will be paying cash price for his HF meds until he is able to enroll in Medicare part D, pt was not started on Entresto or Farxiga as he cannot afford cash price. Adv HF consulted and pt started on Losartan 100mg QD, Toprol 25mg QD and Spironolactone 25mg QD for GDMT. With continued diuresis, pts transaminitis continued to downtrend prior to dc and abd US revealed Enlarged steatotic liver. Trace ascites and Rt pleural effusion. Pt also s/p 5 day course of Azithromycin 500mg QD and CTX 1g QD for CAP. PT consulted and pt recommended for LION.    Pt seen and examined at bedside this AM without any complaints or events overnight, VSS, labs and telemetry reviewed and pt stable for discharge as discussed with Dr. Vallejo. Pt has received appropriate discharge instructions, including medication regimen, access site management and follow up with Dr. Harper/Radha ELLINGTON on ___.    Discharge medications: Lasix 40mg QD, Spironolactone 25mg QD, Losartan 100mg QD, Toprol 25mg QD   77M, former smoker, h/o medication non compliance (follows holistic medicine only) and no medical f/u, w/ PMHx of HTN, DM-II, CHF (EF unknown, dx 2021 @ [facility]), s/p R hip surgery 2021 and Depression, presented to [facility] 5/9/23 c/o dull epigastric/abd pain and worsening CARTAGENA and orthopnea. In ED, pt noted to desat to 80s when laying down, labs significant for BNP 22K, AST//398, T Bili 2.5, WBC 14 w/ left shift and K 2.9. CTA Chest negative for PE but w/ B/L ground glass opacities and B/L pleural effusions. EKG SR w/ RBBB and diffuse TWI. Pt received Lasix 20mg IVP x 1 and 40mg IVP x 1. Pt was admitted to cardiac tele for management of acute CHF and underlying CAP and transaminitis.   Pt diuresed well on IV Lasix 40mg BID, net neg 7L and transitioned to Lasix 40mg PO QD. TTE revealed LV dilated w/ LVEF 15-20%, G3DD, dilated RV w/ reduced RVEF, OSCAR, mod MR, mild-mod TR, PASP 39. Pt s/p dx R+[facility] 5/16 revealing normal coronaries w/ mild luminal irregularities throughout, and normal filling pressures (RA 2, PCWP 9, RV 20/1/14, PA Sat 66%, PA 29/14/19 AoSat 90%, CO 5.2/21, CI 2.55). Given that pt currently DOES NOT have prescription coverage and will be paying cash price for his HF meds until he is able to enroll in Medicare part D, pt was not started on Entresto or Farxiga as he cannot afford cash price. Adv HF consulted and pt started on Losartan 100mg QD, Toprol 25mg QD and Spironolactone 25mg QD for GDMT. With continued diuresis, pts transaminitis continued to downtrend prior to dc and abd US revealed Enlarged steatotic liver. Trace ascites and Rt pleural effusion. Pt also s/p 5 day course of Azithromycin 500mg QD and CTX 1g QD for CAP. PT consulted and pt recommended for LION.    Pt seen and examined at bedside this AM without any complaints or events overnight, VSS, labs and telemetry reviewed and pt stable for discharge as discussed with Dr. Vallejo. Pt has received appropriate discharge instructions, including medication regimen, access site management and follow up with Dr. Harper/Radha ELLINGTON on ___.    Discharge medications: Lasix 40mg QD, Spironolactone 25mg QD, Losartan 100mg QD, Toprol 25mg QD   77M, former smoker, h/o medication non compliance (follows holistic medicine only) and no medical f/u, w/ PMHx of HTN, DM-II, CHF (EF unknown, dx 2021 @ [facility]), s/p R hip surgery 2021 and Depression, presented to [facility] 5/9/23 c/o dull epigastric/abd pain and worsening CARTAGENA and orthopnea. In ED, pt noted to desat to 80s when laying down, labs significant for BNP 22K, AST//398, T Bili 2.5, WBC 14 w/ left shift and K 2.9. CTA Chest negative for PE but w/ B/L ground glass opacities and B/L pleural effusions. EKG SR w/ RBBB and diffuse TWI. Pt received Lasix 20mg IVP x 1 and 40mg IVP x 1. Pt was admitted to cardiac tele for management of acute CHF and underlying CAP and transaminitis.   Pt diuresed well on IV Lasix 40mg BID, net neg 7L and transitioned to Lasix 40mg PO QD. TTE revealed LV dilated w/ LVEF 15-20%, G3DD, dilated RV w/ reduced RVEF, OSCAR, mod MR, mild-mod TR, PASP 39. Pt s/p dx R+[facility] 5/16 revealing normal coronaries w/ mild luminal irregularities throughout, and normal filling pressures (RA 2, PCWP 9, RV 20/1/14, PA Sat 66%, PA 29/14/19 AoSat 90%, CO 5.2/21, CI 2.55). Given that pt currently DOES NOT have prescription coverage and will be paying cash price for his HF meds until he is able to enroll in Medicare part D, pt was not started on Entresto or Farxiga as he cannot afford cash price. Adv HF consulted and pt started on Losartan 100mg QD, Toprol 25mg QD and Spironolactone 25mg QD for GDMT. With continued diuresis, pts transaminitis continued to downtrend prior to dc and abd US revealed Enlarged steatotic liver. Trace ascites and Rt pleural effusion. Pt also s/p 5 day course of Azithromycin 500mg QD and CTX 1g QD for CAP. PT consulted and pt recommended for LION.    Pt seen and examined at bedside this AM without any complaints or events overnight, VSS, labs and telemetry reviewed and pt stable for discharge as discussed with Dr. Vallejo. Pt has received appropriate discharge instructions, including medication regimen, access site management and follow up with Dr. Harper/Radha ELLINGTON w/i 1 week (appointment request sent, pending date).    Discharge medications: Lasix 40mg QD, Spironolactone 25mg QD, Losartan 100mg QD, Toprol 25mg QD   77M, former smoker, h/o medication non compliance (follows holistic medicine only) and no medical f/u, w/ PMHx of HTN, DM-II, CHF (EF unknown, dx 2021 @ [facility]), s/p R hip surgery 2021 and Depression, presented to [facility] 5/9/23 c/o dull epigastric/abd pain and worsening CARTAGENA and orthopnea. In ED, pt noted to desat to 80s when laying down, labs significant for BNP 22K, AST//398, T Bili 2.5, WBC 14 w/ left shift and K 2.9. CTA Chest negative for PE but w/ B/L ground glass opacities and B/L pleural effusions. EKG SR w/ RBBB and diffuse TWI. Pt received Lasix 20mg IVP x 1 and 40mg IVP x 1. Pt was admitted to cardiac tele for management of acute CHF and underlying CAP and transaminitis.   Pt diuresed well on IV Lasix 40mg BID, net neg 7L and transitioned to Lasix 40mg PO QD. TTE revealed LV dilated w/ LVEF 15-20%, G3DD, dilated RV w/ reduced RVEF, OSCAR, mod MR, mild-mod TR, PASP 39. Pt s/p dx R+[facility] 5/16 revealing normal coronaries w/ mild luminal irregularities throughout, and normal filling pressures (RA 2, PCWP 9, RV 20/1/14, PA Sat 66%, PA 29/14/19 AoSat 90%, CO 5.2/21, CI 2.55). Given that pt currently DOES NOT have prescription coverage and will be paying cash price for his HF meds until he is able to enroll in Medicare part D, pt was not started on Entresto or Farxiga as he cannot afford cash price. Adv HF consulted and pt started on Losartan 100mg QD, Toprol 25mg QD and Spironolactone 25mg QD for GDMT. With continued diuresis, pts transaminitis continued to downtrend prior to dc and abd US revealed Enlarged steatotic liver. Trace ascites and Rt pleural effusion. Pt also s/p 5 day course of Azithromycin 500mg QD and CTX 1g QD for CAP. PT consulted and pt recommended for LION.    Pt seen and examined at bedside this AM without any complaints or events overnight, VSS, labs and telemetry reviewed and pt stable for discharge as discussed with Dr. Vallejo. Pt has received appropriate discharge instructions, including medication regimen, access site management and follow up with Dr. Harper/Radha ELLINGTON 5/24/23.    Discharge medications: Lasix 40mg QD, Spironolactone 25mg QD, Losartan 100mg QD, Toprol 25mg QD   77M, former smoker, limited medical FU (follows holistic medicine, but now amenable to tx) and no medical f/u, w/ PMHx of HTN, DM-II, CHF (EF unknown, dx 2021 @ [facility]), s/p R hip surgery 2021 and Depression, presented to [facility] 5/9/23 c/o dull epigastric/abd pain and worsening CARTAGENA and orthopnea. In ED, pt noted to desat to 80s when laying down, labs significant for BNP 22K, AST//398, T Bili 2.5, WBC 14 w/ left shift and K 2.9. CTA Chest negative for PE but w/ B/L ground glass opacities and B/L pleural effusions. EKG SR w/ RBBB and diffuse TWI. Pt received Lasix 20mg IVP x 1 and 40mg IVP x 1. Pt was admitted to cardiac tele for management of acute CHF and underlying CAP and transaminitis.   Pt diuresed well on IV Lasix 40mg BID, net neg 7L and transitioned to Lasix 40mg PO QD. TTE revealed LV dilated w/ LVEF 15-20%, G3DD, dilated RV w/ reduced RVEF, OSCAR, mod MR, mild-mod TR, PASP 39. Pt s/p dx R+[facility] 5/16 revealing normal coronaries w/ mild luminal irregularities throughout, and normal filling pressures (RA 2, PCWP 9, RV 20/1/14, PA Sat 66%, PA 29/14/19 AoSat 90%, CO 5.2/21, CI 2.55). Given that pt currently DOES NOT have prescription coverage and will be paying cash price for his HF meds until he is able to enroll in Medicare part D, pt was not started on Entresto or Farxiga as he cannot afford cash price. Adv HF consulted and pt started on Losartan 100mg QD, Toprol 25mg QD and Spironolactone 25mg QD for GDMT. With continued diuresis, pts transaminitis continued to downtrend prior to dc and abd US revealed Enlarged steatotic liver. Trace ascites and Rt pleural effusion. Pt also s/p 5 day course of Azithromycin 500mg QD and CTX 1g QD for CAP. PT consulted and pt recommended for LION.    Pt seen and examined at bedside this AM without any complaints or events overnight, VSS, labs and telemetry reviewed and pt stable for discharge as discussed with Dr. Vallejo. Pt has received appropriate discharge instructions, including medication regimen, access site management and follow up with Dr. Harper/Radha ELLINGTON 5/24/23.    Discharge medications: Lasix 40mg QD, Spironolactone 25mg QD, Losartan 100mg QD, Toprol 25mg QD

## 2023-05-11 NOTE — PHYSICAL THERAPY INITIAL EVALUATION ADULT - GAIT DEVIATIONS NOTED, PT EVAL
Requires VC's for safety to lock rollator breaks when taking seated rest break, no loss of balance t/o/decreased gennaro/decreased velocity of limb motion/decreased step length/decreased weight-shifting ability

## 2023-05-11 NOTE — PHYSICAL THERAPY INITIAL EVALUATION ADULT - PERTINENT HX OF CURRENT PROBLEM, REHAB EVAL
77M  former smoker, (was a heavy smoker and quit 37 yrs ago) ambulates with walker with PMhx HTN, DM2, right hip surgery one year ago ([facility])remote h/o EToH (quit 3-5yrs ago), depression (zoloft),  diagnosed with CHF at OSH in 2021 and lost to follow up, non compliant with medications presents [facility] 5/10 c/o several days of dull, intermittent SSCP with SOB and orthopnea. In speaking with patient, he reports one week of SSCP, 3/10 dull discomfort with CARTAGENA minimal exertion (ambulating one block), 2+ pillow orthopnea for one week. Pt. states he has not seen a physician for approximately 10 yrs and does not have a PCP  or Cardiologist. According to patient,  he was last seen by a physician at [facility] before hip surgery and was told he had HF in which he was prescribed medication.  Pt. admits to not taking medication and instead took over counter vitamins because he believes in homeopathic medicine.

## 2023-05-11 NOTE — PHYSICAL THERAPY INITIAL EVALUATION ADULT - GAIT DISTANCE, PT EVAL
x 2; 1 seated rest break secondary to feeling 10/10 shortness of breath however SpO2 98% on room air/50 feet

## 2023-05-11 NOTE — PROGRESS NOTE ADULT - PROBLEM SELECTOR PLAN 4
Possibly i/s/o ascites above, vs. gastric related as reports frequently taking Excedrin in the past  - D/w Medicine, will [ ] send stool H.Pylori Antigen test, then once sent can start PPI trial (protonix po 40mg qd)

## 2023-05-11 NOTE — PROGRESS NOTE ADULT - ASSESSMENT
77-year-old male with a PMHx of HTN, DMII, former alcohol/tobacco abuse, depression and CHF who presented with acute on chronic heart failure exacerbation.        #Acute on Chronic HF Exacerbation    -further management as per cardiology     -TTE pending     -currently on IV Lasix 40mg BID     #Concern for CAP   -leukocytosis improved without intervention but reasonable to treat for CAP given mild elevation in procalcitonin and imaging suggestive of multifocal pneumonia    -continue with CTX and Azithromycin (discontinue if legionella negative), plan for 5-day course      #Macrocytosis    -check B12 and Folate      #Transaminitis    -likely congestive hepatopathy, expect improvement with diuresis   -hepatitis panel negative   -follow up abdominal US    -further workup pending trend and results of US    #"Upset Stomach" (Dyspepsia?)   -no alarm features present, recommend obtaining H. pylori stool Ag and then starting empiric PPI after stool collection   -please obtain serum lipase     #Pre-Diabetes (A1c 6.1%)   -ISS for now, needs outpatient follow up for continued management      DVT PPx: Lovenox       Dispo: pending clinical improvement

## 2023-05-11 NOTE — PROGRESS NOTE ADULT - PROBLEM SELECTOR PLAN 6
A1c 6.1, prev on Metformin   -Continue mISS    F: None  E: Replete if K<4 or Mag<2  N: DASH Diet, 1.2L fluid restriction  VTEppx: Lovenox  Dispo: pending clinical progression

## 2023-05-11 NOTE — PHYSICAL THERAPY INITIAL EVALUATION ADULT - ADDITIONAL COMMENTS
As per pt, PTA he was independent with all functional mobility, ADLs, and IADLs with use of rollator. Has a rollator however L brake is broken and handles are duct taped held together. Was only able to walk 3-4 blocks PTA 2/2 CARTAGENA. Has a walk in shower with a chair and grab bars.

## 2023-05-11 NOTE — PROGRESS NOTE ADULT - SUBJECTIVE AND OBJECTIVE BOX
Subjective:  No acute events overnight.  Patient continues to endorse an "upset stomach" but is adamant it is not painful.  This sensation is poorly localized but most notable in the epigastric region.  States he does have nausea but denies emesis or early satiety.  There is no relation of this sensation to PO intake or bowel movements.  Denies melena or BRBPR.  It is intermittent but cannot identify any patterns, no obvious triggers that make it worse.  Has taken [place] at home with some relief.  Also states has taken Excedrin 2-4x/day for the past couple weeks due to headaches.  Denies HA, CP, abdominal pain, vomiting, fever, chills or diarrhea.     Objective:   Vital Signs:  T(C): 37.2 (11 May 2023 08:53), Max: 37.2 (11 May 2023 08:53)  T(F): 99 (11 May 2023 08:53), Max: 99 (11 May 2023 08:53)  HR: 102 (11 May 2023 09:30) (92 - 102)  BP: 140/94 (11 May 2023 09:30) (105/70 - 141/97)  BP(mean): 103 (10 May 2023 16:34) (103 - 103)  RR: 18 (11 May 2023 08:50) (18 - 18)  SpO2: 94% (11 May 2023 09:30) (90% - 100%)    Parameters below as of 11 May 2023 09:30  Patient On (Oxygen Delivery Method): room air    Physical Exam:  -Gen: NAD, resting in bed  -HEENT: EOMI, PERRL, no scleral icterus, no JVD  -CV: normal S1 and S2, no murmurs appreciated   -Lungs: coarse breath sounds, no wheezing, normal respiratory effort on NC  -Ab: soft, NT, ND, normal BS  -Ext: no LE edema  -Neuro: A&O x 3, no focal deficits     Labs:                        14.0   10.64 )-----------( 161      ( 11 May 2023 07:20 )             42.4       05-11    139  |  96  |  32<H>  ----------------------------<  127<H>  3.8   |  29  |  0.96    Ca    9.2      11 May 2023 07:20  Phos  2.6     05-10  Mg     2.2     05-11    TPro  5.7<L>  /  Alb  3.3  /  TBili  1.9<H>  /  DBili  x   /  AST  279<H>  /  ALT  455<H>  /  AlkPhos  92  05-11     Medications:  MEDICATIONS  (STANDING):  azithromycin   Tablet 500 milliGRAM(s) Oral daily  cefTRIAXone   IVPB 1000 milliGRAM(s) IV Intermittent every 24 hours  enoxaparin Injectable 40 milliGRAM(s) SubCutaneous every 24 hours  furosemide   Injectable 40 milliGRAM(s) IV Push two times a day  insulin lispro (ADMELOG) corrective regimen sliding scale   SubCutaneous Before meals and at bedtime    MEDICATIONS  (PRN):  aluminum hydroxide/magnesium hydroxide/simethicone Suspension 30 milliLiter(s) Oral every 4 hours PRN Dyspepsia  ipratropium    for Nebulization 500 MICROGram(s) Nebulizer every 6 hours PRN Shortness of Breath and/or Wheezing

## 2023-05-11 NOTE — PROGRESS NOTE ADULT - PROBLEM SELECTOR PLAN 1
volume overloaded, warm and HD stable, SpO2 96% RA  -BNP 22K and CTA Chest w/ B/L pleural effusion (R>L)   [ ] TTE ordered  - Desats noted on Tele overnight : [ ] f/u repeat BNP (added on 5/11)  - suspect c/b component of MARGARITA as well- CPAP at bedside overnight- did not tolerate per RN 5/10 ovn, will try again 5/11 ovn ;  send for sleep study upon discharge  - CXR 5/11 AM: Small R Pleff similar to minimally increased or shifting since prior exam 5/9/2023. No definite acute infiltrates. No ptx. Prominent size heart.  -net neg 650ml/24H, c/w Lasix to 40mg IV BID; Is and Os not accurately racked. Unable to place condom cath due to anatomy. Obtain daily standing weights   -Consider starting ACE/ARB and/or BB prior to d/c  -Consider ischemic w/u when euvolemic  -Core measure, daily weight, strict I&Os and 1.2L fluid restriction

## 2023-05-11 NOTE — PROGRESS NOTE ADULT - NS ATTEND AMEND GEN_ALL_CORE FT
Initial attending contact date 5/10/23     . See PA note written above for details. I reviewed the PA documentation. I have personally seen and examined this patient. I reviewed vitals, labs, medications, cardiac studies, and additional imaging. I agree with the above PA's findings and plans as written above with the following additions/statements.    77M, former smoker, h/o medication non compliance (follows holistic medicine only) and no medical f/u, w/ PMHx of HTN, DM-II, CHF (EF unknown, dx 2021 @ [facility]), s/p R hip surgery 2021 and Depression admitted with HF exacerbation (unknown EF) in setting of possible CAP     -Pt reports 5 month hx of CARTAGENA as well as mild chest pressure with exertion. Also with abd fullness  -Fluid overloaded on exam with BNP 17968. EKG RBBB  -Cont lasix 20 mg IV bid, -1.2 last 24 hours however Is and Os not accurately racked. Unable to place condom cath due to anatomy. Pls obtain daily standing weights   -ECHO pending, CHF measures, Strict Is and Os   -Given possible infiltrate on CXR ,elevated procal plan to cont with abx coverage for CAP  -Viral panel pending   -Appreciate medicine input  -Abd US pending, CPAP at night for MARGARITA

## 2023-05-11 NOTE — DISCHARGE NOTE PROVIDER - PROVIDER TOKENS
PROVIDER:[TOKEN:[[id number]]] PROVIDER:[TOKEN:[[id number]],FOLLOWUP:[1 week]] PROVIDER:[TOKEN:[[id number]],SCHEDULEDAPPT:[05/24/2023],SCHEDULEDAPPTTIME:[11:30 AM]]

## 2023-05-11 NOTE — PROGRESS NOTE ADULT - PROBLEM SELECTOR PLAN 2
presents w/ SOB, afebrile, WBC 14 w/ left shift and non toxic appearing  -Procal 0.36--> ; CRP 49--> 38 5/11 -->   -ESR wnl, Lactate wnl  -COVID/RVP (-)  -CTA Chest w/ ground glass opacities, neg for PE;  5/11 CXR as above  -f/u Legionella- in lab; if Negative will DC Azithromycin;  strep pneumo- neg  -C/w Azithromycin 500mg QD x 5 days and CTX 1g QD x 5 days  (5/10-5/14)

## 2023-05-11 NOTE — PROGRESS NOTE ADULT - SUBJECTIVE AND OBJECTIVE BOX
Interventional Cardiology PA Adult Progress Note    Subjective Assessment: Patient seen and examined at bedside.   	  MEDICATIONS:  furosemide   Injectable 40 milliGRAM(s) IV Push two times a day    azithromycin   Tablet 500 milliGRAM(s) Oral daily  cefTRIAXone   IVPB 1000 milliGRAM(s) IV Intermittent every 24 hours    ipratropium    for Nebulization 500 MICROGram(s) Nebulizer every 6 hours PRN      aluminum hydroxide/magnesium hydroxide/simethicone Suspension 30 milliLiter(s) Oral every 4 hours PRN    insulin lispro (ADMELOG) corrective regimen sliding scale   SubCutaneous Before meals and at bedtime    enoxaparin Injectable 40 milliGRAM(s) SubCutaneous every 24 hours        [PHYSICAL EXAM:  TELEMETRY:  T(C): 37.2 (05-11-23 @ 08:53), Max: 37.2 (05-11-23 @ 08:53)  HR: 102 (05-11-23 @ 09:30) (92 - 102)  BP: 140/94 (05-11-23 @ 09:30) (105/70 - 141/97)  RR: 18 (05-11-23 @ 08:50) (18 - 18)  SpO2: 94% (05-11-23 @ 09:30) (90% - 100%)  Wt(kg): --  I&O's Summary    10 May 2023 07:01  -  11 May 2023 07:00  --------------------------------------------------------  IN: 820 mL / OUT: 1470 mL / NET: -650 mL    11 May 2023 07:01  -  11 May 2023 11:51  --------------------------------------------------------  IN: 350 mL / OUT: 375 mL / NET: -25 mL        	    LABS:	 	  CARDIAC MARKERS:                          14.0   10.64 )-----------( 161      ( 11 May 2023 07:20 )             42.4     05-11    139  |  96  |  32<H>  ----------------------------<  127<H>  3.8   |  29  |  0.96    Ca    9.2      11 May 2023 07:20  Phos  2.6     05-10  Mg     2.2     05-11    TPro  5.7<L>  /  Alb  3.3  /  TBili  1.9<H>  /  DBili  x   /  AST  279<H>  /  ALT  455<H>  /  AlkPhos  92  05-11    proBNP:   Lipid Profile:   HgA1c:   TSH: Thyroid Stimulating Hormone, Serum: 1.990 uIU/mL (05-10 @ 14:47)    PT/INR - ( 10 May 2023 08:21 )   PT: 18.3 sec;   INR: 1.53          PTT - ( 10 May 2023 08:21 )  PTT:29.0 sec     Interventional Cardiology PA Adult Progress Note    Subjective Assessment: Patient seen and examined at bedside.  Pt reports nausea and "upset stomach"  but denies emesis, constipation, diarrhea, miah pain, blood in stool.  Endorses SOB, denies chest pain, palpitations. Other ROS negative.  	  MEDICATIONS:  furosemide   Injectable 40 milliGRAM(s) IV Push two times a day  azithromycin   Tablet 500 milliGRAM(s) Oral daily  cefTRIAXone   IVPB 1000 milliGRAM(s) IV Intermittent every 24 hours  ipratropium    for Nebulization 500 MICROGram(s) Nebulizer every 6 hours PRN  aluminum hydroxide/magnesium hydroxide/simethicone Suspension 30 milliLiter(s) Oral every 4 hours PRN  insulin lispro (ADMELOG) corrective regimen sliding scale   SubCutaneous Before meals and at bedtime  enoxaparin Injectable 40 milliGRAM(s) SubCutaneous every 24 hours      [PHYSICAL EXAM:  TELEMETRY: desaturations overnight (lowest to 83%) ; Sinus rhythm HR 100s w/ few couplets  T(C): 37.2 (05-11-23 @ 08:53), Max: 37.2 (05-11-23 @ 08:53)  HR: 102 (05-11-23 @ 09:30) (92 - 102)  BP: 140/94 (05-11-23 @ 09:30) (105/70 - 141/97)  RR: 18 (05-11-23 @ 08:50) (18 - 18)  SpO2: 94% (05-11-23 @ 09:30) (90% - 100%)  Wt(kg): --  I&O's Summary    10 May 2023 07:01  -  11 May 2023 07:00  --------------------------------------------------------  IN: 820 mL / OUT: 1470 mL / NET: -650 mL    11 May 2023 07:01  -  11 May 2023 11:51  --------------------------------------------------------  IN: 350 mL / OUT: 375 mL / NET: -25 mL      Appearance: Pt seen resting in bed  	  HEENT:  NCAT, moist oral mucosa, PERRL, EOMI  Neck: Supple, + JVD   Cardiovascular: Normal S1 S2,  No murmurs  Respiratory: Lungs clear to auscultation; mildly increased WOB with tachypnea; spO2 maintained >92% on room air during exam	  Gastrointestinal:  Softly distended, Non-tender, +normoactive BS	  Skin: No rashes, No ecchymoses, No cyanosis  Extremities: Normal range of motion, No clubbing, cyanosis or edema  Vascular: Peripheral pulses palpable 2+ bilaterally  Neurologic: Non-focal  Psychiatry: A & O x 3, Mood & affect appropriate   	    LABS:	 	  CARDIAC MARKERS:                          14.0   10.64 )-----------( 161      ( 11 May 2023 07:20 )             42.4     05-11    139  |  96  |  32<H>  ----------------------------<  127<H>  3.8   |  29  |  0.96    Ca    9.2      11 May 2023 07:20  Phos  2.6     05-10  Mg     2.2     05-11    TPro  5.7<L>  /  Alb  3.3  /  TBili  1.9<H>  /  DBili  x   /  AST  279<H>  /  ALT  455<H>  /  AlkPhos  92  05-11    proBNP:   Lipid Profile:   HgA1c:   TSH: Thyroid Stimulating Hormone, Serum: 1.990 uIU/mL (05-10 @ 14:47)    PT/INR - ( 10 May 2023 08:21 )   PT: 18.3 sec;   INR: 1.53          PTT - ( 10 May 2023 08:21 )  PTT:29.0 sec

## 2023-05-11 NOTE — DISCHARGE NOTE PROVIDER - NSDCCPCAREPLAN_GEN_ALL_CORE_FT
PRINCIPAL DISCHARGE DIAGNOSIS  Diagnosis: Acute on chronic systolic congestive heart failure  Assessment and Plan of Treatment: You have a weak heart, also known as Congestive Heart Failure (CHF). Heart failure is a condition in which the heart does not pump or fill with blood well. As a result, the heart lags behind in its job of moving blood throughout the body. This can lead to symptoms such as swelling, trouble breathing, and feeling tired. Your Ejection Fraction (EF) is _______, a normal EF is 55-60%.  -Please continue _____ to prevent fluid build up in the body.  -Avoid drinking more than 1.5L of fluid daily and maintain a low salt diet (max 2grams daily).  -Please weigh yourself daily, for any significant increases in daily weight of 2lbs/day or 5lbs/week with associated swelling in the legs or abdomen and/or shortness of breath, please call your doctor or go to the emergency room.  -Follow up with  __ in 1 week.     PRINCIPAL DISCHARGE DIAGNOSIS  Diagnosis: Acute on chronic systolic congestive heart failure  Assessment and Plan of Treatment: You have a weak heart, also known as Congestive Heart Failure (CHF). Heart failure is a condition in which the heart does not pump or fill with blood well. As a result, the heart lags behind in its job of moving blood throughout the body. This can lead to symptoms such as swelling, trouble breathing, and feeling tired. Your Ejection Fraction (EF) is 15-20%, a normal EF is 55-60%. You underwent a cardiac angiogram which revealed no blockages in the arteries of your heart.  -Please continue _____ to prevent fluid build up in the body.  -Avoid drinking more than 1.5L of fluid daily and maintain a low salt diet (max 2grams daily).  -Please weigh yourself daily, for any significant increases in daily weight of 2lbs/day or 5lbs/week with associated swelling in the legs or abdomen and/or shortness of breath, please call your doctor or go to the emergency room.  -Follow up with  __ in 1 week.      SECONDARY DISCHARGE DIAGNOSES  Diagnosis: HTN (hypertension)  Assessment and Plan of Treatment:     Diagnosis: CAP (community acquired pneumonia)  Assessment and Plan of Treatment: You were diagnosed with pneumonia, or an infection of the lungs, and you were treated with antibiotics throughout your hospital course. Please follow-up with your primary care physician when you leave the hospital. Should you experience symptoms such as but not limited to: worsening shortness of breath, wheezing, severe cough, coughing bloody sputum, unrelenting/high fever (>103 F or lasting >3 days), and chest pain, please return to the emergency department for interval evaluation.     PRINCIPAL DISCHARGE DIAGNOSIS  Diagnosis: Acute on chronic systolic congestive heart failure  Assessment and Plan of Treatment: You have a weak heart, also known as Congestive Heart Failure (CHF). Heart failure is a condition in which the heart does not pump or fill with blood well. As a result, the heart lags behind in its job of moving blood throughout the body. This can lead to symptoms such as swelling, trouble breathing, and feeling tired. Your Ejection Fraction (EF) is 15-20%, a normal EF is 55-60%. You underwent a cardiac angiogram which revealed no blockages in the arteries of your heart.  -Please continue Lasix (Furosemide) 40mg daily and Spironolactone 25mg daily to prevent fluid build up in the body.  -Please continue Losartan 100mg daily and Metoprolol 25mg daily to help strenghten  your heart muscle.  -Avoid drinking more than 1.5L of fluid daily and maintain a low salt diet (max 2grams daily).  -Please weigh yourself daily, for any significant increases in daily weight of 2lbs/day or 5lbs/week with associated swelling in the legs or abdomen and/or shortness of breath, please call your doctor or go to the emergency room.  -Follow up with  __ in 1 week.      SECONDARY DISCHARGE DIAGNOSES  Diagnosis: CAP (community acquired pneumonia)  Assessment and Plan of Treatment: You were diagnosed with pneumonia, or an infection of the lungs, and you were treated with antibiotics throughout your hospital course. Please follow-up with your primary care physician when you leave the hospital. Should you experience symptoms such as but not limited to: worsening shortness of breath, wheezing, severe cough, coughing bloody sputum, unrelenting/high fever (>103 F or lasting >3 days), and chest pain, please return to the emergency department for interval evaluation.     PRINCIPAL DISCHARGE DIAGNOSIS  Diagnosis: Acute on chronic systolic congestive heart failure  Assessment and Plan of Treatment: You have a weak heart, also known as Congestive Heart Failure (CHF). Heart failure is a condition in which the heart does not pump or fill with blood well. As a result, the heart lags behind in its job of moving blood throughout the body. This can lead to symptoms such as swelling, trouble breathing, and feeling tired. Your Ejection Fraction (EF) is 15-20%, a normal EF is 55-60%. You underwent a cardiac angiogram which revealed no blockages in the arteries of your heart.  -Please continue Lasix (Furosemide) 40mg daily and Spironolactone 25mg daily to prevent fluid build up in the body.  -Please continue Losartan 100mg daily and Metoprolol 25mg daily to help strenghten  your heart muscle.  -Avoid drinking more than 1.5L of fluid daily and maintain a low salt diet (max 2grams daily).  -Please weigh yourself daily, for any significant increases in daily weight of 2lbs/day or 5lbs/week with associated swelling in the legs or abdomen and/or shortness of breath, please call your doctor or go to the emergency room.  -Follow up with Dr. Harper within 1 week, his office will call you with the appointment time.      SECONDARY DISCHARGE DIAGNOSES  Diagnosis: CAP (community acquired pneumonia)  Assessment and Plan of Treatment: You were diagnosed with pneumonia, or an infection of the lungs, and you were treated with antibiotics throughout your hospital course. Please follow-up with your primary care physician when you leave the hospital. Should you experience symptoms such as but not limited to: worsening shortness of breath, wheezing, severe cough, coughing bloody sputum, unrelenting/high fever (>103 F or lasting >3 days), and chest pain, please return to the emergency department for interval evaluation.     PRINCIPAL DISCHARGE DIAGNOSIS  Diagnosis: Acute on chronic systolic congestive heart failure  Assessment and Plan of Treatment: You have a weak heart, also known as Congestive Heart Failure (CHF). Heart failure is a condition in which the heart does not pump or fill with blood well. As a result, the heart lags behind in its job of moving blood throughout the body. This can lead to symptoms such as swelling, trouble breathing, and feeling tired. Your Ejection Fraction (EF) is 15-20%, a normal EF is 55-60%. You underwent a cardiac angiogram which revealed no blockages in the arteries of your heart.  -Please continue Lasix (Furosemide) 40mg daily and Spironolactone 25mg daily to prevent fluid build up in the body.  -Please continue Losartan 100mg daily and Metoprolol 25mg daily to help strenghten  your heart muscle.  -Avoid drinking more than 1.5L of fluid daily and maintain a low salt diet (max 2grams daily).  -Please weigh yourself daily, for any significant increases in daily weight of 2lbs/day or 5lbs/week with associated swelling in the legs or abdomen and/or shortness of breath, please call your doctor or go to the emergency room.  -Follow up with Dr. Harper on 5/24/23 at 11:30am.      SECONDARY DISCHARGE DIAGNOSES  Diagnosis: CAP (community acquired pneumonia)  Assessment and Plan of Treatment: You were diagnosed with pneumonia, or an infection of the lungs, and you were treated with antibiotics throughout your hospital course. Please follow-up with your primary care physician when you leave the hospital. Should you experience symptoms such as but not limited to: worsening shortness of breath, wheezing, severe cough, coughing bloody sputum, unrelenting/high fever (>103 F or lasting >3 days), and chest pain, please return to the emergency department for interval evaluation.

## 2023-05-11 NOTE — DISCHARGE NOTE PROVIDER - NSDCMRMEDTOKEN_GEN_ALL_CORE_FT
Entresto 49 mg-51 mg oral tablet: 1 tab(s) orally 2 times a day  Farxiga 10 mg oral tablet: 1 tab(s) orally once a day   ipratropium 500 mcg/2.5 mL inhalation solution: 2.5 milliliter(s) inhaled every 6 hours As needed Shortness of Breath and/or Wheezing  Lasix 40 mg oral tablet: 1 tab(s) orally once a day  losartan 100 mg oral tablet: 1 tab(s) orally once a day  metoprolol succinate 25 mg oral tablet, extended release: 1 tab(s) orally once a day  pantoprazole 40 mg oral delayed release tablet: 1 tab(s) orally once a day (before a meal)  spironolactone 25 mg oral tablet: 1 tab(s) orally once a day   Lasix 40 mg oral tablet: 1 tab(s) orally once a day  losartan 100 mg oral tablet: 1 tab(s) orally once a day  metoprolol succinate 25 mg oral tablet, extended release: 1 tab(s) orally once a day  pantoprazole 40 mg oral delayed release tablet: 1 tab(s) orally once a day (before a meal)  spironolactone 25 mg oral tablet: 1 tab(s) orally once a day

## 2023-05-11 NOTE — DISCHARGE NOTE PROVIDER - NSDCFUSCHEDAPPT_GEN_ALL_CORE_FT
[facility] Physician [facility]  [facility] [address]  Scheduled Appointment: 05/24/2023

## 2023-05-12 LAB
ALBUMIN SERPL ELPH-MCNC: 3.5 G/DL — SIGNIFICANT CHANGE UP (ref 3.3–5)
ALP SERPL-CCNC: 102 U/L — SIGNIFICANT CHANGE UP (ref 40–120)
ALT FLD-CCNC: 470 U/L — HIGH (ref 10–45)
ANION GAP SERPL CALC-SCNC: 15 MMOL/L — SIGNIFICANT CHANGE UP (ref 5–17)
AST SERPL-CCNC: 262 U/L — HIGH (ref 10–40)
BILIRUB DIRECT SERPL-MCNC: 1 MG/DL — HIGH (ref 0–0.3)
BILIRUB INDIRECT FLD-MCNC: 1.3 MG/DL — HIGH (ref 0.2–1)
BILIRUB SERPL-MCNC: 2.3 MG/DL — HIGH (ref 0.2–1.2)
BILIRUB SERPL-MCNC: 2.4 MG/DL — HIGH (ref 0.2–1.2)
BUN SERPL-MCNC: 36 MG/DL — HIGH (ref 7–23)
CALCIUM SERPL-MCNC: 8.6 MG/DL — SIGNIFICANT CHANGE UP (ref 8.4–10.5)
CHLORIDE SERPL-SCNC: 93 MMOL/L — LOW (ref 96–108)
CO2 SERPL-SCNC: 30 MMOL/L — SIGNIFICANT CHANGE UP (ref 22–31)
CREAT SERPL-MCNC: 1.11 MG/DL — SIGNIFICANT CHANGE UP (ref 0.5–1.3)
CRP SERPL-MCNC: 56.1 MG/L — HIGH (ref 0–4)
EGFR: 68 ML/MIN/1.73M2 — SIGNIFICANT CHANGE UP
FOLATE SERPL-MCNC: >20 NG/ML — SIGNIFICANT CHANGE UP
GLUCOSE BLDC GLUCOMTR-MCNC: 114 MG/DL — HIGH (ref 70–99)
GLUCOSE BLDC GLUCOMTR-MCNC: 139 MG/DL — HIGH (ref 70–99)
GLUCOSE BLDC GLUCOMTR-MCNC: 148 MG/DL — HIGH (ref 70–99)
GLUCOSE BLDC GLUCOMTR-MCNC: 154 MG/DL — HIGH (ref 70–99)
GLUCOSE BLDC GLUCOMTR-MCNC: 155 MG/DL — HIGH (ref 70–99)
GLUCOSE SERPL-MCNC: 154 MG/DL — HIGH (ref 70–99)
HCT VFR BLD CALC: 41.6 % — SIGNIFICANT CHANGE UP (ref 39–50)
HGB BLD-MCNC: 13.3 G/DL — SIGNIFICANT CHANGE UP (ref 13–17)
LIDOCAIN IGE QN: 89 U/L — HIGH (ref 7–60)
MAGNESIUM SERPL-MCNC: 2.1 MG/DL — SIGNIFICANT CHANGE UP (ref 1.6–2.6)
MCHC RBC-ENTMCNC: 32 GM/DL — SIGNIFICANT CHANGE UP (ref 32–36)
MCHC RBC-ENTMCNC: 32.5 PG — SIGNIFICANT CHANGE UP (ref 27–34)
MCV RBC AUTO: 101.7 FL — HIGH (ref 80–100)
NRBC # BLD: 0 /100 WBCS — SIGNIFICANT CHANGE UP (ref 0–0)
PLATELET # BLD AUTO: 164 K/UL — SIGNIFICANT CHANGE UP (ref 150–400)
POTASSIUM SERPL-MCNC: 3.9 MMOL/L — SIGNIFICANT CHANGE UP (ref 3.5–5.3)
POTASSIUM SERPL-SCNC: 3.9 MMOL/L — SIGNIFICANT CHANGE UP (ref 3.5–5.3)
PROCALCITONIN SERPL-MCNC: 0.27 NG/ML — HIGH (ref 0.02–0.1)
PROT SERPL-MCNC: 5.8 G/DL — LOW (ref 6–8.3)
RBC # BLD: 4.09 M/UL — LOW (ref 4.2–5.8)
RBC # FLD: 15.3 % — HIGH (ref 10.3–14.5)
SODIUM SERPL-SCNC: 138 MMOL/L — SIGNIFICANT CHANGE UP (ref 135–145)
VIT B12 SERPL-MCNC: >2000 PG/ML — HIGH (ref 232–1245)
WBC # BLD: 10.27 K/UL — SIGNIFICANT CHANGE UP (ref 3.8–10.5)
WBC # FLD AUTO: 10.27 K/UL — SIGNIFICANT CHANGE UP (ref 3.8–10.5)

## 2023-05-12 PROCEDURE — 99233 SBSQ HOSP IP/OBS HIGH 50: CPT

## 2023-05-12 PROCEDURE — 99233 SBSQ HOSP IP/OBS HIGH 50: CPT | Mod: GC,25

## 2023-05-12 PROCEDURE — 76604 US EXAM CHEST: CPT | Mod: 26,GC

## 2023-05-12 PROCEDURE — 93306 TTE W/DOPPLER COMPLETE: CPT | Mod: 26

## 2023-05-12 RX ORDER — HYDRALAZINE HCL 50 MG
25 TABLET ORAL
Refills: 0 | Status: DISCONTINUED | OUTPATIENT
Start: 2023-05-12 | End: 2023-05-16

## 2023-05-12 RX ORDER — POTASSIUM CHLORIDE 20 MEQ
20 PACKET (EA) ORAL ONCE
Refills: 0 | Status: COMPLETED | OUTPATIENT
Start: 2023-05-12 | End: 2023-05-12

## 2023-05-12 RX ORDER — ISOSORBIDE DINITRATE 5 MG/1
10 TABLET ORAL
Refills: 0 | Status: DISCONTINUED | OUTPATIENT
Start: 2023-05-12 | End: 2023-05-16

## 2023-05-12 RX ORDER — FUROSEMIDE 40 MG
40 TABLET ORAL
Refills: 0 | Status: DISCONTINUED | OUTPATIENT
Start: 2023-05-13 | End: 2023-05-15

## 2023-05-12 RX ADMIN — ISOSORBIDE DINITRATE 10 MILLIGRAM(S): 5 TABLET ORAL at 22:43

## 2023-05-12 RX ADMIN — ENOXAPARIN SODIUM 40 MILLIGRAM(S): 100 INJECTION SUBCUTANEOUS at 13:34

## 2023-05-12 RX ADMIN — Medication 20 MILLIEQUIVALENT(S): at 09:41

## 2023-05-12 RX ADMIN — AZITHROMYCIN 500 MILLIGRAM(S): 500 TABLET, FILM COATED ORAL at 12:13

## 2023-05-12 RX ADMIN — Medication 25 MILLIGRAM(S): at 18:58

## 2023-05-12 RX ADMIN — Medication 40 MILLIGRAM(S): at 13:35

## 2023-05-12 RX ADMIN — Medication 1: at 17:18

## 2023-05-12 RX ADMIN — CEFTRIAXONE 100 MILLIGRAM(S): 500 INJECTION, POWDER, FOR SOLUTION INTRAMUSCULAR; INTRAVENOUS at 09:41

## 2023-05-12 RX ADMIN — Medication 40 MILLIGRAM(S): at 06:31

## 2023-05-12 NOTE — DIETITIAN INITIAL EVALUATION ADULT - OTHER CALCULATIONS
5'10''  pounds +-10%  Wt 190 pounds BMI 27.2 %[id number]  IBW for EER D/t Edema in CHF pt  Adjust for age, CHF; fluids per team

## 2023-05-12 NOTE — PROGRESS NOTE ADULT - PROBLEM SELECTOR PLAN 4
Possibly i/s/o ascites above, vs. gastric related as reports frequently taking Excedrin in the past  - 5/11- D/w Medicine, will send stool H.Pylori Antigen test, then once sent can start PPI trial (protonix po 40mg qd)  - 5/12- stool not yet collected, however no abd pain or tenderness today- as per Med, lower threshold to test hpylori if pt continues to deny abd pain

## 2023-05-12 NOTE — PROGRESS NOTE ADULT - ASSESSMENT
77M, former smoker, h/o medication non compliance (follows holistic medicine only) and no medical f/u, w/ PMHx of HTN, DM-II, CHF (EF unknown, dx 2021 @ [facility]), s/p R hip surgery 2021 and Depression, presented to [facility] c/o dull abd pain/CP w/ associated SOB and orthopnea, pt admitted to cardiac tele for management of acute CHF and underlying CAP, hospital course also significant for transaminitis.

## 2023-05-12 NOTE — PROGRESS NOTE ADULT - PROBLEM SELECTOR PLAN 1
Problem: Infection - Central Venous Catheter-Associated Bloodstream Infection:  Goal: Will show no infection signs and symptoms  Will show no infection signs and symptoms   Outcome: Ongoing -TTE 5/12: Dilated LV w/ severely reduced LVSF, EF 15-20% w/ global hypokinesis. Gr III LV diastolic dysfxn. Dil. RV. Reduced RVSF. Biatrial enlargemt. Mod MR; Mild-to-mod TR.  PASP 39mmHg. Small pericard effusn.  [ ] will consult Advanced CHF team in AM  -BNP 22K--> 12K   -5/9 CTA Chest w/ B/L pleural effusion (R>L); CXR 5/11 AM: Small R Pleff minimally increased or shifting. 5/12 POCUS by Pulm w/ diffuse panlobar B-lines  -i/O: net neg 250ml/24H, c/w Lasix to 40mg IV BID-- trialing Metolazone tonight 5/12 prior to PM Lasix to see if improves diuresis; not accurately tracked- unable to place condom cath due to anatomy. Obtain daily standing weights: neg 1.0kg today 5/12 since admission.  -Starting Isordil 10mg TID and Hydral 25mg PO TID today 5/12  -Consider ischemic w/u when euvolemic  -Consider ace/arb, bb prior to dc   -Core measure, daily weight, strict I&Os and 1.2L fluid restriction    O2  -Desats noted on Tele overnight, suspect c/b component of MARGARITA as well- pt did not tolerate CPAP 5/10 ovn; pt did tolerate 5/11 ovn however still w/ Desats;  send for sleep study upon discharge  -Satting well on 2LNC 5/12, intermittently pulls NC off and desats. Desat on Room air to upper 70s and mid 80s.  -D/w CM- started process for Home O2 5/12  -Pulmonary consulted 5/12

## 2023-05-12 NOTE — PROCEDURE NOTE - NSUS ED PROC ASSISTED BY1 FT
INR at goal. Medications and chart reviewed. No changes noted to necessitate adjustment of warfarin or follow-up plan. See calendar.        Lex

## 2023-05-12 NOTE — CONSULT NOTE ADULT - SUBJECTIVE AND OBJECTIVE BOX
PULMONARY SERVICE INITIAL CONSULT NOTE    HPI:  AOx3, Stable, Full Code     77M  former smoker, ( was a heavy smoker and quit 37 yrs ago) ambulates with walker  with PMhx HTN, DM2, right hip surgery one year ago ([facility])remote h/o EToH (quit 3-5yrs ago), depression (zoloft),  diagnosed with CHF at OSH in 2021 and lost to follow up, non compliant with medications presents H 5/10 c/o several days of dull, intermittent SSCP with SOB and orthopnea. No LE edema.     In speaking with patient, he reports one week of SSCP, 3/10 dull discomfort with CARTAGENA minimal exertion (ambulating one block), 2+ pillow orthopnea for one week.  Pt. states he has not seen a physician for approximately 10 yrs and does not have a PCP  or Cardiologist.  According to patient,  he was last seen by a physician at [facility] before hip surgery and was told he had HF in which he was prescribed medication.  Pt. admits to not taking medication and instead took over counter vitamins because he believes in homeopathic medicine.  However, after interviewer spoke with patient regarding the importance of medical follow up,  patient agrees to start seeing a Cardiologist and  being compliant with medication.  Pt. denies fever, chills, HA, chest pain, palpitations, dizziness, diaphoresis, abdominal discomfort and n/v/d..      Pulmonary history: Patient is a former smoker, with no known pulmonary medical conditions who who is now admitted for CHF exacerbation. Pulm asked to evaluate due to hypoxemia.    ED workup reveals:   - Vitals: 160/95, HR 87, SpO2 95% on RA   - Workup: EKG with old RBBB, new diffuse TWI compared to prior. +edema and JVD, desat to 80s while lying down. CTA neg for PE, +bilat pleural effusions.   - Labs: K 2.9, WBC 14K, BNP 22K, LFTs elevated.   - Meds: Pt treated with lasix 20mg ivp + 40mg ivp and K repletion. BP 150s/100s treated with lisinopril.     Now admitted for further management of acute exacerbation of chronic chf. (10 May 2023 00:56)      REVIEW OF SYSTEMS:  Constitutional: No fever, weight loss or fatigue  Eyes: No eye pain, visual disturbances, or discharge  ENMT:  No difficulty hearing, tinnitus, vertigo; No sinus or throat pain  Neck: No pain, stiffness or neck swelling  Respiratory: see HPI  Cardiovascular: No chest pain, palpitations, dizziness or leg swelling  Gastrointestinal: No abdominal or epigastric pain. No nausea, vomiting or hematemesis; No diarrhea or constipation. No melena or hematochezia.  Genitourinary: No dysuria, frequency, hematuria or incontinence  Neurological: No headaches, memory loss, loss of strength, numbness or tremors  Skin: No itching, burning, rashes or lesions   Lymph Nodes: No enlarged glands  Endocrine: No heat or cold intolerance; No hair loss  Musculoskeletal: No joint pain or swelling; No muscle, back or extremity pain  Psychiatric: No depression, anxiety, mood swings or difficulty sleeping  Heme/Lymph: No easy bruising or bleeding gums  Allergy and Immunologic: No hives or eczema    PAST MEDICAL & SURGICAL HISTORY:  HTN (hypertension)      DM2 (diabetes mellitus, type 2)      Heart failure      Anxiety and depression          FAMILY HISTORY:      SOCIAL HISTORY:  Smoking Status: [ ] Current, [ ] Former, [ ] Never  Pack Years:    MEDICATIONS:  Pulmonary:  ipratropium    for Nebulization 500 MICROGram(s) Nebulizer every 6 hours PRN    Antimicrobials:  azithromycin   Tablet 500 milliGRAM(s) Oral daily  cefTRIAXone   IVPB 1000 milliGRAM(s) IV Intermittent every 24 hours    Anticoagulants:  enoxaparin Injectable 40 milliGRAM(s) SubCutaneous every 24 hours    Onc:    GI/:  aluminum hydroxide/magnesium hydroxide/simethicone Suspension 30 milliLiter(s) Oral every 4 hours PRN    Endocrine:  insulin lispro (ADMELOG) corrective regimen sliding scale   SubCutaneous Before meals and at bedtime    Cardiac:  furosemide   Injectable 40 milliGRAM(s) IV Push two times a day  metolazone 5 milliGRAM(s) Oral once    Other Medications:      Allergies    No Known Allergies    Intolerances        Vital Signs Last 24 Hrs  T(C): 36.1 (12 May 2023 09:04), Max: 37.3 (11 May 2023 18:51)  T(F): 97 (12 May 2023 09:04), Max: 99.2 (11 May 2023 18:51)  HR: 91 (12 May 2023 09:40) (80 - 111)  BP: 142/89 (12 May 2023 09:40) (130/93 - 154/95)  BP(mean): --  RR: 18 (12 May 2023 09:40) (18 - 20)  SpO2: 95% (12 May 2023 09:40) (94% - 96%)    Parameters below as of 12 May 2023 09:40  Patient On (Oxygen Delivery Method): nasal cannula  O2 Flow (L/min): 3      05-11 @ 07:01  -  05-12 @ 07:00  --------------------------------------------------------  IN: 710 mL / OUT: 1025 mL / NET: -315 mL    05-12 @ 07:01  - 05-12 @ 13:34  --------------------------------------------------------  IN: 180 mL / OUT: 0 mL / NET: 180 mL          PHYSICAL EXAM:  Constitutional: WDWN  Head: NC/AT  EENT: PERRL, anicteric sclera; oropharynx clear, MMM  Neck: supple, no appreciable JVD  Respiratory: CTA B/L; no W/R/R  Cardiovascular: +S1/S2, RRR  Gastrointestinal: soft, NT/ND  Extremities: WWP; no edema, clubbing or cyanosis  Vascular: 2+ radial pulses B/L  Neurological: awake and alert; PAZ    LABS:      CBC Full  -  ( 12 May 2023 05:51 )  WBC Count : 10.27 K/uL  RBC Count : 4.09 M/uL  Hemoglobin : 13.3 g/dL  Hematocrit : 41.6 %  Platelet Count - Automated : 164 K/uL  Mean Cell Volume : 101.7 fl  Mean Cell Hemoglobin : 32.5 pg  Mean Cell Hemoglobin Concentration : 32.0 gm/dL  Auto Neutrophil # : x  Auto Lymphocyte # : x  Auto Monocyte # : x  Auto Eosinophil # : x  Auto Basophil # : x  Auto Neutrophil % : x  Auto Lymphocyte % : x  Auto Monocyte % : x  Auto Eosinophil % : x  Auto Basophil % : x    05-12    138  |  93<L>  |  36<H>  ----------------------------<  154<H>  3.9   |  30  |  1.11    Ca    8.6      12 May 2023 05:51  Mg     2.1     05-12    TPro  5.8<L>  /  Alb  3.5  /  TBili  2.4<H>  /  DBili  1.0<H>  /  AST  262<H>  /  ALT  470<H>  /  AlkPhos  102  05-12                      RADIOLOGY & ADDITIONAL STUDIES: PULMONARY SERVICE INITIAL CONSULT NOTE    HPI:  AOx3, Stable, Full Code     77M  former smoker, ( was a heavy smoker and quit 37 yrs ago) ambulates with walker  with PMhx HTN, DM2, right hip surgery one year ago ([facility])remote h/o EToH (quit 3-5yrs ago), depression (zoloft),  diagnosed with CHF at OSH in 2021 and lost to follow up, non compliant with medications presents [facility] 5/10 c/o several days of dull, intermittent SSCP with SOB and orthopnea. No LE edema.     In speaking with patient, he reports one week of SSCP, 3/10 dull discomfort with CARTAGENA minimal exertion (ambulating one block), 2+ pillow orthopnea for one week.  Pt. states he has not seen a physician for approximately 10 yrs and does not have a PCP  or Cardiologist.  According to patient,  he was last seen by a physician at [facility] before hip surgery and was told he had HF in which he was prescribed medication.  Pt. admits to not taking medication and instead took over counter vitamins because he believes in homeopathic medicine.  However, after interviewer spoke with patient regarding the importance of medical follow up,  patient agrees to start seeing a Cardiologist and  being compliant with medication.  Pt. denies fever, chills, HA, chest pain, palpitations, dizziness, diaphoresis, abdominal discomfort and n/v/d..      Pulmonary history: Patient is a former smoker, with no known pulmonary medical conditions who who is now admitted for CHF exacerbation. Pulm asked to evaluate due to hypoxemia and desaturations overnight. Patient has no known history of MARGARITA, has never been told he stops snoring or has witnessed apnea episodes. Still has some shortness of breath. Says has pet cat, used to work as a . Born in [place]. No hx of tb, no history of incarceration or being undomeciled.    ED workup reveals:   - Vitals: 160/95, HR 87, SpO2 95% on RA   - Workup: EKG with old RBBB, new diffuse TWI compared to prior. +edema and JVD, desat to 80s while lying down. CTA neg for PE, +bilat pleural effusions.   - Labs: K 2.9, WBC 14K, BNP 22K, LFTs elevated.   - Meds: Pt treated with lasix 20mg ivp + 40mg ivp and K repletion. BP 150s/100s treated with lisinopril.     Now admitted for further management of acute exacerbation of chronic chf. (10 May 2023 00:56)      REVIEW OF SYSTEMS:  Constitutional: No fever, weight loss or fatigue  Eyes: No eye pain, visual disturbances, or discharge  ENMT:  No difficulty hearing, tinnitus, vertigo; No sinus or throat pain  Neck: No pain, stiffness or neck swelling  Respiratory: see HPI  Cardiovascular: No chest pain, palpitations, dizziness or leg swelling  Gastrointestinal: No abdominal or epigastric pain. No nausea, vomiting or hematemesis; No diarrhea or constipation. No melena or hematochezia.  Genitourinary: No dysuria, frequency, hematuria or incontinence  Neurological: No headaches, memory loss, loss of strength, numbness or tremors  Skin: No itching, burning, rashes or lesions   Lymph Nodes: No enlarged glands  Endocrine: No heat or cold intolerance; No hair loss  Musculoskeletal: No joint pain or swelling; No muscle, back or extremity pain  Psychiatric: No depression, anxiety, mood swings or difficulty sleeping  Heme/Lymph: No easy bruising or bleeding gums  Allergy and Immunologic: No hives or eczema    PAST MEDICAL & SURGICAL HISTORY:  HTN (hypertension)      DM2 (diabetes mellitus, type 2)      Heart failure      Anxiety and depression          FAMILY HISTORY:      SOCIAL HISTORY:  Smoking Status: [ ] Current, [x] Former, [ ] Never  Pack Years:    MEDICATIONS:  Pulmonary:  ipratropium    for Nebulization 500 MICROGram(s) Nebulizer every 6 hours PRN    Antimicrobials:  azithromycin   Tablet 500 milliGRAM(s) Oral daily  cefTRIAXone   IVPB 1000 milliGRAM(s) IV Intermittent every 24 hours    Anticoagulants:  enoxaparin Injectable 40 milliGRAM(s) SubCutaneous every 24 hours    Onc:    GI/:  aluminum hydroxide/magnesium hydroxide/simethicone Suspension 30 milliLiter(s) Oral every 4 hours PRN    Endocrine:  insulin lispro (ADMELOG) corrective regimen sliding scale   SubCutaneous Before meals and at bedtime    Cardiac:  furosemide   Injectable 40 milliGRAM(s) IV Push two times a day  metolazone 5 milliGRAM(s) Oral once    Other Medications:      Allergies    No Known Allergies    Intolerances        Vital Signs Last 24 Hrs  T(C): 36.1 (12 May 2023 09:04), Max: 37.3 (11 May 2023 18:51)  T(F): 97 (12 May 2023 09:04), Max: 99.2 (11 May 2023 18:51)  HR: 91 (12 May 2023 09:40) (80 - 111)  BP: 142/89 (12 May 2023 09:40) (130/93 - 154/95)  BP(mean): --  RR: 18 (12 May 2023 09:40) (18 - 20)  SpO2: 95% (12 May 2023 09:40) (94% - 96%)    Parameters below as of 12 May 2023 09:40  Patient On (Oxygen Delivery Method): nasal cannula  O2 Flow (L/min): 3      05-11 @ 07:01  -  05-12 @ 07:00  --------------------------------------------------------  IN: 710 mL / OUT: 1025 mL / NET: -315 mL    05-12 @ 07:01  -  05-12 @ 13:34  --------------------------------------------------------  IN: 180 mL / OUT: 0 mL / NET: 180 mL          PHYSICAL EXAM:  Constitutional: NAD. Lying flat.  Head: NC/AT  EENT: PERRLA  Neck: +JVD  Respiratory: No accessory muscle use  Cardiovascular: +Murmur. Displaced PMI  Gastrointestinal: soft, NT/ND  Extremities: WWP; no edema, clubbing or cyanosis  Vascular: 2+ radial pulses B/L  Neurological: awake and alert; PAZ    LABS:      CBC Full  -  ( 12 May 2023 05:51 )  WBC Count : 10.27 K/uL  RBC Count : 4.09 M/uL  Hemoglobin : 13.3 g/dL  Hematocrit : 41.6 %  Platelet Count - Automated : 164 K/uL  Mean Cell Volume : 101.7 fl  Mean Cell Hemoglobin : 32.5 pg  Mean Cell Hemoglobin Concentration : 32.0 gm/dL  Auto Neutrophil # : x  Auto Lymphocyte # : x  Auto Monocyte # : x  Auto Eosinophil # : x  Auto Basophil # : x  Auto Neutrophil % : x  Auto Lymphocyte % : x  Auto Monocyte % : x  Auto Eosinophil % : x  Auto Basophil % : x    05-12    138  |  93<L>  |  36<H>  ----------------------------<  154<H>  3.9   |  30  |  1.11    Ca    8.6      12 May 2023 05:51  Mg     2.1     05-12    TPro  5.8<L>  /  Alb  3.5  /  TBili  2.4<H>  /  DBili  1.0<H>  /  AST  262<H>  /  ALT  470<H>  /  AlkPhos  102  05-12                      RADIOLOGY & ADDITIONAL STUDIES:

## 2023-05-12 NOTE — DIETITIAN INITIAL EVALUATION ADULT - OTHER INFO
77M  with PMhx HTN, DM2, right hip surgery one year ago ([facility]), remote h/o EToH (quit 3-5yrs ago), depression (zoloft), diagnosed with CHF at OSH in 2021 and lost to follow up, non compliant with medications presents [facility] 5/10 c/o several days of dull, intermittent SSCP with SOB and orthopnea. Now admitted with HF exacerbation (unknown EF) in setting of possible CAP.     Pt seen this AM on 5UR. Pt having a hard time staying focused. RN present. Able to obtain little info. Attempted to follow up with pt this afternoon however soundly sleeping. Ordered for DASH TLC/CONSCHO diet, 1200ml fluid restriction. Unsure of %PO intake at this time as pt hyper focused on not getting cereal this AM for breakfast. Upon further questioning pt reports he did not order cereal for breakfast which is the reason he did not get it. Attempted to obtain food preferences however pt only able to report liking cereal and vegetables. NKFA. No issues chewing/swallowing. HDL 25. BM+ 5/9, Noted c/o "Upset Stomach." Pt ordered for MAALOX. No pain. Zaid 19. 1+BL ankle edema. No pressure ulcers.   Please see below for nutritions recommendations.

## 2023-05-12 NOTE — PROGRESS NOTE ADULT - PROBLEM SELECTOR PLAN 2
presents w/ SOB, afebrile, WBC 14 w/ left shift and non toxic appearing  -Procal 0.36--> .27 ; CRP elevated  -ESR wnl, Lactate wnl; COVID/RVP (-)  -CTA Chest w/ ground glass opacities, neg for PE;  5/11 CXR as above  -f/u Legionella- in lab; if Negative will DC Azithromycin;  strep pneumo- neg  -C/w Azithro 500mg QD x 5days and CTX 1g QD x 5days abx total   (5/10-5/14)    -Per Pulm will consider switch to PO abx to decrease fluid intake

## 2023-05-12 NOTE — PROGRESS NOTE ADULT - SUBJECTIVE AND OBJECTIVE BOX
Interventional Cardiology PA Adult Progress Note    Subjective Assessment: Patient seen and examined at bedside.   	  MEDICATIONS:  furosemide   Injectable 40 milliGRAM(s) IV Push two times a day    azithromycin   Tablet 500 milliGRAM(s) Oral daily  cefTRIAXone   IVPB 1000 milliGRAM(s) IV Intermittent every 24 hours    ipratropium    for Nebulization 500 MICROGram(s) Nebulizer every 6 hours PRN      aluminum hydroxide/magnesium hydroxide/simethicone Suspension 30 milliLiter(s) Oral every 4 hours PRN    insulin lispro (ADMELOG) corrective regimen sliding scale   SubCutaneous Before meals and at bedtime    enoxaparin Injectable 40 milliGRAM(s) SubCutaneous every 24 hours  potassium chloride   Powder 20 milliEquivalent(s) Oral once        [PHYSICAL EXAM:  TELEMETRY:  T(C): 36.1 (05-12-23 @ 09:04), Max: 37.3 (05-11-23 @ 18:51)  HR: 94 (05-12-23 @ 06:59) (86 - 111)  BP: 130/93 (05-12-23 @ 00:04) (130/93 - 154/95)  RR: 20 (05-12-23 @ 06:59) (18 - 20)  SpO2: 94% (05-12-23 @ 06:59) (90% - 98%)  Wt(kg): --  I&O's Summary    11 May 2023 07:01  -  12 May 2023 07:00  --------------------------------------------------------  IN: 710 mL / OUT: 925 mL / NET: -215 mL    12 May 2023 07:01  -  12 May 2023 09:10  --------------------------------------------------------  IN: 180 mL / OUT: 0 mL / NET: 180 mL                                                   LABS:	 	  CARDIAC MARKERS:                                  13.3   10.27 )-----------( 164      ( 12 May 2023 05:51 )             41.6     05-12    138  |  93<L>  |  36<H>  ----------------------------<  154<H>  3.9   |  30  |  1.11    Ca    8.6      12 May 2023 05:51  Mg     2.1     05-12    TPro  5.8<L>  /  Alb  3.5  /  TBili  2.4<H>  /  DBili  x   /  AST  262<H>  /  ALT  470<H>  /  AlkPhos  102  05-12    proBNP:   Lipid Profile:   HgA1c:   TSH:        Interventional Cardiology PA Adult Progress Note    Subjective Assessment: Patient seen and examined at bedside. Reports continued SOB. Denies CP, orthopnea, abd pain, n/v/d/c, lightheadedness, HA, difficulty walking.  	  MEDICATIONS:  furosemide   Injectable 40 milliGRAM(s) IV Push two times a day  azithromycin   Tablet 500 milliGRAM(s) Oral daily  cefTRIAXone   IVPB 1000 milliGRAM(s) IV Intermittent every 24 hours  ipratropium    for Nebulization 500 MICROGram(s) Nebulizer every 6 hours PRN  aluminum hydroxide/magnesium hydroxide/simethicone Suspension 30 milliLiter(s) Oral every 4 hours PRN  insulin lispro (ADMELOG) corrective regimen sliding scale   SubCutaneous Before meals and at bedtime  enoxaparin Injectable 40 milliGRAM(s) SubCutaneous every 24 hours  potassium chloride   Powder 20 milliEquivalent(s) Oral once        [PHYSICAL EXAM:  TELEMETRY: frequent desats - lowest with optimal pleth to low 80s overnight; few couplets  T(C): 36.1 (05-12-23 @ 09:04), Max: 37.3 (05-11-23 @ 18:51)  HR: 94 (05-12-23 @ 06:59) (86 - 111)  BP: 130/93 (05-12-23 @ 00:04) (130/93 - 154/95)  RR: 20 (05-12-23 @ 06:59) (18 - 20)  SpO2: 94% (05-12-23 @ 06:59) (90% - 98%)  Wt(kg): --  I&O's Summary    11 May 2023 07:01  -  12 May 2023 07:00  --------------------------------------------------------  IN: 710 mL / OUT: 925 mL / NET: -215 mL    12 May 2023 07:01  -  12 May 2023 09:10  --------------------------------------------------------  IN: 180 mL / OUT: 0 mL / NET: 180 mL    Appearance: Pt seen in bed, elderly male, poor grooming; mild respiratory distress noted 	  HEENT:   NCAT;  PERRL, EOMI	  Neck: Supple, + JVD   Cardiovascular: Normal S1 S2,  No murmurs  Respiratory: bibasilar crackles; increased WOB with tachypnea; desat on exam when pt removed NC - desat on RA to 78% on exam; improved to >92% with 2LNC  Gastrointestinal:  Softly distended, Non-tender, +normoactive BS	  Skin: No rashes, No ecchymoses, No cyanosis  Extremities: Normal range of motion, No clubbing, cyanosis or edema  Vascular: Peripheral pulses palpable 2+ bilaterally  Neurologic: Non-focal  Psychiatry: A & O x 3, Mood & affect appropriate   	                                           LABS:	 	  CARDIAC MARKERS:                        13.3   10.27 )-----------( 164      ( 12 May 2023 05:51 )             41.6     05-12    138  |  93<L>  |  36<H>  ----------------------------<  154<H>  3.9   |  30  |  1.11    Ca    8.6      12 May 2023 05:51  Mg     2.1     05-12    TPro  5.8<L>  /  Alb  3.5  /  TBili  2.4<H>  /  DBili  x   /  AST  262<H>  /  ALT  470<H>  /  AlkPhos  102  05-12    proBNP:   Lipid Profile:   HgA1c:   TSH:

## 2023-05-12 NOTE — PROGRESS NOTE ADULT - PROBLEM SELECTOR PLAN 6
A1c 6.1, prev on Metformin   -Continue mISS    F: None  E: Replete if K<4 or Mag<2  N: DASH Diet, 1.2L fluid restriction  VTEppx: Lovenox  Dispo: pending clinical progression  F/u CM for Home O2- process started 5/12

## 2023-05-12 NOTE — DIETITIAN INITIAL EVALUATION ADULT - PERTINENT LABORATORY DATA
05-12    138  |  93<L>  |  36<H>  ----------------------------<  154<H>  3.9   |  30  |  1.11    Ca    8.6      12 May 2023 05:51  Mg     2.1     05-12    TPro  5.8<L>  /  Alb  3.5  /  TBili  2.4<H>  /  DBili  1.0<H>  /  AST  262<H>  /  ALT  470<H>  /  AlkPhos  102  05-12  POCT Blood Glucose.: 139 mg/dL (05-12-23 @ 11:47)  A1C with Estimated Average Glucose Result: 6.1 % (05-10-23 @ 08:21)

## 2023-05-12 NOTE — PROGRESS NOTE ADULT - PROBLEM SELECTOR PLAN 5
-150s  -Pt endorses prev on Lisinopril and HCTZ, however has not taken for many yrs  -Continue Lasix 40mg IV BID  -Starting Isordil 10mg TID and Hydral 25mg PO TID today 5/12  -Consider restarting Lisinopril or arb, starting bb, prior to d/c i/s/o CHF

## 2023-05-12 NOTE — DIETITIAN INITIAL EVALUATION ADULT - PERTINENT MEDS FT
MEDICATIONS  (STANDING):  azithromycin   Tablet 500 milliGRAM(s) Oral daily  cefTRIAXone   IVPB 1000 milliGRAM(s) IV Intermittent every 24 hours  enoxaparin Injectable 40 milliGRAM(s) SubCutaneous every 24 hours  furosemide   Injectable 40 milliGRAM(s) IV Push two times a day  insulin lispro (ADMELOG) corrective regimen sliding scale   SubCutaneous Before meals and at bedtime  metolazone 5 milliGRAM(s) Oral once    MEDICATIONS  (PRN):  aluminum hydroxide/magnesium hydroxide/simethicone Suspension 30 milliLiter(s) Oral every 4 hours PRN Dyspepsia  ipratropium    for Nebulization 500 MICROGram(s) Nebulizer every 6 hours PRN Shortness of Breath and/or Wheezing

## 2023-05-12 NOTE — PROGRESS NOTE ADULT - NS ATTEND AMEND GEN_ALL_CORE FT
Initial attending contact date 5/10/23     . See PA note written above for details. I reviewed the PA documentation. I have personally seen and examined this patient. I reviewed vitals, labs, medications, cardiac studies, and additional imaging. I agree with the above PA's findings and plans as written above with the following additions/statements.    77M, former smoker, h/o medication non compliance (follows holistic medicine only) and no medical f/u, w/ PMHx of HTN, DM-II, CHF (EF unknown, dx 2021 @ [facility]), s/p R hip surgery 2021 and Depression admitted with HF exacerbation (unknown EF) in setting of possible CAP     -Pt reports 5 month hx of CARTAGENA as well as mild chest pressure with exertion. Also with abd fullness  -Fluid overloaded on exam initially with BNP 37189 downtrended to 73789. EKG RBBB  -On lasix 20 mg IV bid, Is and Os not accurately racked. Unable to place condom cath due to anatomy. Pls obtain daily standing weights Over last 24 hours, only lost 1.5 lbs. Increase lasix 40 mg IV bid   -ECHO pending, CHF measures, Strict Is and Os   -On exam , still with SOB with use of abdominal muscles. Desatts to high 80s on RA  -Given possible infiltrate on CXR ,elevated procal plan to cont with abx coverage for CAP  -Pulm to consult to eval for further pulm etiologies of SOB including possible atypical pneumonia, bronchiolitis. Awaiting echo  -Appreciate medicine input. LFTs remain elevated- likely passive congestion. ABD US - fatty liver   -CPAP at night for MARGARITA. Initial attending contact date 5/10/23     . See PA note written above for details. I reviewed the PA documentation. I have personally seen and examined this patient. I reviewed vitals, labs, medications, cardiac studies, and additional imaging. I agree with the above PA's findings and plans as written above with the following additions/statements.    77M, former smoker, h/o medication non compliance (follows holistic medicine only) and no medical f/u, w/ PMHx of HTN, DM-II, CHF (EF unknown, dx 2021 @ [facility]), s/p R hip surgery 2021 and Depression admitted with HF exacerbation (unknown EF) in setting of possible CAP     -Pt reports 5 month hx of CARTAGENA as well as mild chest pressure with exertion. Also with abd fullness  -Fluid overloaded on exam initially with BNP 54649 downtrended to 10245. EKG RBBB  -On lasix 40 mg IV bid, Is and Os not accurately racked. Unable to place condom cath due to anatomy. Pls obtain daily standing weights Over last 24 hours, only lost 1.5 lbs. Metolazone 5 mg po x 1 prior to next dose today  -ECHO pending, CHF measures, Strict Is and Os   -On exam , still with SOB with use of abdominal muscles. Desatts to high 80s on RA  -Given possible infiltrate on CXR ,elevated procal plan to cont with abx coverage for CAP  -Pulm to consult to eval for further pulm etiologies of SOB including possible atypical pneumonia, bronchiolitis. Awaiting echo  -Appreciate medicine input. LFTs remain elevated- likely passive congestion. ABD US - fatty liver   -CPAP at night for MARGARITA.

## 2023-05-12 NOTE — PROGRESS NOTE ADULT - ASSESSMENT
77-year-old male with a PMHx of HTN, DMII, former alcohol/tobacco abuse, depression and CHF who presented with acute on chronic heart failure exacerbation.         #Acute on Chronic HF Exacerbation     -further management as per cardiology      -TTE pending      -currently on IV Lasix 40mg BID, will trial metolazone prior to next dose      #Acute Hypoxic Respiratory Failure   -see above for diuretic plan  -pulm consulted today, follow up recommendations     #Concern for CAP    -leukocytosis improved without intervention but reasonable to treat for CAP given mild elevation in procalcitonin and imaging suggestive of multifocal pneumonia     -continue with CTX and Azithromycin (discontinue if legionella negative), plan for 5-day course       #Macrocytosis     -follow up B12 and Folate       #Transaminitis     -likely congestive hepatopathy, expect improvement with diuresis    -US showed enlarged steatotic liver and trace ascites    -hepatitis panel negative      #"Upset Stomach" (Dyspepsia?, Gastritis?)    -no alarm features present, recommend obtaining H. pylori stool Ag and then starting empiric PPI after stool collection    -lipase only minimally elevated     #Pre-Diabetes (A1c 6.1%)    -ISS for now, needs outpatient follow up for continued management      #Concern for MARGARITA   -CPAP at night if patient tolerated   -outpatient follow up      DVT PPx: Lovenox     Dispo: pending clinical improvement

## 2023-05-12 NOTE — PROGRESS NOTE ADULT - SUBJECTIVE AND OBJECTIVE BOX
Subjective:  No acute events overnight.  Patient is doing well today.  Upset stomach seems to be improved.  Denies HA, CP, SOB, abdominal pain, nausea, vomiting, fever, chills or diarrhea.     Objective:   Vital Signs:  T(C): 36.1 (12 May 2023 09:04), Max: 37.3 (11 May 2023 18:51)  T(F): 97 (12 May 2023 09:04), Max: 99.2 (11 May 2023 18:51)  HR: 91 (12 May 2023 09:40) (80 - 111)  BP: 142/89 (12 May 2023 09:40) (130/93 - 154/95)  BP(mean): --  RR: 18 (12 May 2023 09:40) (18 - 20)  SpO2: 95% (12 May 2023 09:40) (94% - 98%)    Parameters below as of 12 May 2023 09:40  Patient On (Oxygen Delivery Method): nasal cannula  O2 Flow (L/min): 3    Physical Exam:  -Gen: NAD, resting in bed  -HEENT: EOMI, PERRL, no scleral icterus, no JVD  -CV: normal S1 and S2, no murmurs appreciated   -Lungs: coarse breath sounds, minimal wheezing  -Ab: soft, NT, ND, normal BS  -Ext: no LE edema  -Neuro: A&O x 3, no focal deficits     Labs:                        13.3   10.27 )-----------( 164      ( 12 May 2023 05:51 )             41.6       05-12    138  |  93<L>  |  36<H>  ----------------------------<  154<H>  3.9   |  30  |  1.11    Ca    8.6      12 May 2023 05:51  Mg     2.1     05-12    TPro  5.8<L>  /  Alb  3.5  /  TBili  2.4<H>  /  DBili  1.0<H>  /  AST  262<H>  /  ALT  470<H>  /  AlkPhos  102  05-12    Medications:  MEDICATIONS  (STANDING):  azithromycin   Tablet 500 milliGRAM(s) Oral daily  cefTRIAXone   IVPB 1000 milliGRAM(s) IV Intermittent every 24 hours  enoxaparin Injectable 40 milliGRAM(s) SubCutaneous every 24 hours  furosemide   Injectable 40 milliGRAM(s) IV Push two times a day  insulin lispro (ADMELOG) corrective regimen sliding scale   SubCutaneous Before meals and at bedtime    MEDICATIONS  (PRN):  aluminum hydroxide/magnesium hydroxide/simethicone Suspension 30 milliLiter(s) Oral every 4 hours PRN Dyspepsia  ipratropium    for Nebulization 500 MICROGram(s) Nebulizer every 6 hours PRN Shortness of Breath and/or Wheezing

## 2023-05-12 NOTE — CONSULT NOTE ADULT - ASSESSMENT
77M  former smoker, ( was a heavy smoker and quit 37 yrs ago) ambulates with walker  with PMhx HTN, DM2, right hip surgery one year ago ([facility])remote h/o EToH (quit 3-5yrs ago), depression (zoloft),  diagnosed with CHF at OSH in 2021. Now admitted with AHRF in the setting of HF exacerbation.  Pulm asked to evaluate due to concern for MARGARITA and still with persistent hypoxemia.    Patient presented with Pulm venous congestion and elevated pro BNP 22K and imaging findings suggestive of HF exacerbation now being diuresed but unclear net negative for LOS, repeat BNP 11K with reported desaturations to the 70s overnight and non-adherent with CPAP overnight. On exam today patient examined on RA with spo2 94% with good waveform, bedside ultrasound with B-lines bilaterally and trace effusion, and echo notably with moderately to severely reduced EF (EPSS appears greater than 7). Suspect still a degree of pulm edema present. with regards to hypoxemia present it is possible patient has MARGARITA vs. Central sleep apnea (seen in the setting of heart failure)- can trial nasal CPAP at night miles improve tolerance. Low suspicion for acute bacterial process (procal< 0.5, afebrile and without productive cough, although with elevated CRP)  - Would continue IV diuresis  - strict Is and Os  - can trial Nasal CPAP  - ok to tolerate SPo2 92% or above  - Can consider switching to PO abx to minimize fluid via lines 77M  former smoker, ( was a heavy smoker and quit 37 yrs ago) ambulates with walker  with PMhx HTN, DM2, right hip surgery one year ago ([facility])remote h/o EToH (quit 3-5yrs ago), depression (zoloft),  diagnosed with CHF at OSH in 2021. Now admitted with AHRF in the setting of HF exacerbation.  Pulm asked to evaluate due to concern for MARGARITA and still with persistent hypoxemia.    Patient presented with Pulm venous congestion and elevated pro BNP 22K and imaging findings suggestive of HF exacerbation now being diuresed but unclear net negative for LOS, repeat BNP 11K with reported desaturations to the 70s overnight and non-adherent with CPAP overnight. On exam today patient examined on RA with spo2 94% with good waveform, bedside ultrasound with B-lines bilaterally and trace effusion, and echo notably with moderately to severely reduced EF (EPSS appears greater than 7). Suspect still a degree of pulm edema present. With regards to noctunral hypoxemia present it is possible patient has MARGARITA vs. Central sleep apnea (seen in the setting of heart failure)- can trial nasal CPAP at night miles improve tolerance. Low suspicion or acute bacterial process (procal< 0.5, afebrile and without productive cough, although with elevated CRP)  - Would continue IV diuresis  - strict Is and Os  - No need for CPAP at night currently  - ok to tolerate SPo2 92% or above  - Can discontinue abx as low suspicion for acute bacterial pneumonia, if wish to continue treatment would recommend transitioning to po abx to minimize fluid input

## 2023-05-12 NOTE — PROGRESS NOTE ADULT - PROBLEM SELECTOR PLAN 3
Procedure Scheduling Information    Procedure:  Colonoscopy  Last Procedure:  1/25/17  Procedure Date:  12/23/22  Procedure Time:  9:30 AM  Reason:  Screening, hx of colon polyps  Referring provider:  Wait list  Location: [facility]   Prep:  Gavilyte-N  Sedation:  IV Sedation     If MAC, why:  N/A  Pacemaker/Defibrillator:  no     Device Interrogation Form Faxed:  n/a  Anticoagulation:  no    Prescribing Provider:  N/a    Fully vaccinated with booster, no COVID test needed.                        presents w/ dull abd/epigastric pain- not endorsing any abd pain today  -likely hepatic congestion i/s/o acute CHF  -AST/ALT continue to be elevated at 262/470 -  trend daily  -Bilirubin uptrending- d/w Medicine- rec check Lipase: mildly elevated to 89   -5/11 Abd US: Enlarged steatotic liver. Trace ascites and Rt pleural effusion,  -Hep panel neg  -Avoid Tylenol and hepatotoxic agents 31-Aug-2020 23:00

## 2023-05-13 LAB
ALBUMIN SERPL ELPH-MCNC: 3.2 G/DL — LOW (ref 3.3–5)
ALP SERPL-CCNC: 101 U/L — SIGNIFICANT CHANGE UP (ref 40–120)
ALT FLD-CCNC: 302 U/L — HIGH (ref 10–45)
ANION GAP SERPL CALC-SCNC: 12 MMOL/L — SIGNIFICANT CHANGE UP (ref 5–17)
AST SERPL-CCNC: 111 U/L — HIGH (ref 10–40)
BILIRUB SERPL-MCNC: 1.6 MG/DL — HIGH (ref 0.2–1.2)
BUN SERPL-MCNC: 34 MG/DL — HIGH (ref 7–23)
CALCIUM SERPL-MCNC: 8.4 MG/DL — SIGNIFICANT CHANGE UP (ref 8.4–10.5)
CHLORIDE SERPL-SCNC: 96 MMOL/L — SIGNIFICANT CHANGE UP (ref 96–108)
CO2 SERPL-SCNC: 29 MMOL/L — SIGNIFICANT CHANGE UP (ref 22–31)
CREAT SERPL-MCNC: 0.94 MG/DL — SIGNIFICANT CHANGE UP (ref 0.5–1.3)
EGFR: 83 ML/MIN/1.73M2 — SIGNIFICANT CHANGE UP
GLUCOSE BLDC GLUCOMTR-MCNC: 134 MG/DL — HIGH (ref 70–99)
GLUCOSE BLDC GLUCOMTR-MCNC: 159 MG/DL — HIGH (ref 70–99)
GLUCOSE BLDC GLUCOMTR-MCNC: 171 MG/DL — HIGH (ref 70–99)
GLUCOSE BLDC GLUCOMTR-MCNC: 97 MG/DL — SIGNIFICANT CHANGE UP (ref 70–99)
GLUCOSE SERPL-MCNC: 100 MG/DL — HIGH (ref 70–99)
H PYLORI AG STL QL: NEGATIVE — SIGNIFICANT CHANGE UP
HCT VFR BLD CALC: 42.2 % — SIGNIFICANT CHANGE UP (ref 39–50)
HGB BLD-MCNC: 13.5 G/DL — SIGNIFICANT CHANGE UP (ref 13–17)
MAGNESIUM SERPL-MCNC: 2 MG/DL — SIGNIFICANT CHANGE UP (ref 1.6–2.6)
MCHC RBC-ENTMCNC: 32 GM/DL — SIGNIFICANT CHANGE UP (ref 32–36)
MCHC RBC-ENTMCNC: 32.5 PG — SIGNIFICANT CHANGE UP (ref 27–34)
MCV RBC AUTO: 101.4 FL — HIGH (ref 80–100)
NRBC # BLD: 0 /100 WBCS — SIGNIFICANT CHANGE UP (ref 0–0)
PLATELET # BLD AUTO: 173 K/UL — SIGNIFICANT CHANGE UP (ref 150–400)
POTASSIUM SERPL-MCNC: 3.6 MMOL/L — SIGNIFICANT CHANGE UP (ref 3.5–5.3)
POTASSIUM SERPL-SCNC: 3.6 MMOL/L — SIGNIFICANT CHANGE UP (ref 3.5–5.3)
PROT SERPL-MCNC: 5.6 G/DL — LOW (ref 6–8.3)
RBC # BLD: 4.16 M/UL — LOW (ref 4.2–5.8)
RBC # FLD: 15.1 % — HIGH (ref 10.3–14.5)
SODIUM SERPL-SCNC: 137 MMOL/L — SIGNIFICANT CHANGE UP (ref 135–145)
WBC # BLD: 10.99 K/UL — HIGH (ref 3.8–10.5)
WBC # FLD AUTO: 10.99 K/UL — HIGH (ref 3.8–10.5)

## 2023-05-13 PROCEDURE — 99233 SBSQ HOSP IP/OBS HIGH 50: CPT

## 2023-05-13 RX ORDER — PANTOPRAZOLE SODIUM 20 MG/1
40 TABLET, DELAYED RELEASE ORAL
Refills: 0 | Status: DISCONTINUED | OUTPATIENT
Start: 2023-05-13 | End: 2023-05-17

## 2023-05-13 RX ORDER — POTASSIUM CHLORIDE 20 MEQ
40 PACKET (EA) ORAL ONCE
Refills: 0 | Status: COMPLETED | OUTPATIENT
Start: 2023-05-13 | End: 2023-05-13

## 2023-05-13 RX ORDER — IBUPROFEN 200 MG
200 TABLET ORAL ONCE
Refills: 0 | Status: COMPLETED | OUTPATIENT
Start: 2023-05-13 | End: 2023-05-13

## 2023-05-13 RX ORDER — LACTOBACILLUS ACIDOPHILUS 100MM CELL
1 CAPSULE ORAL DAILY
Refills: 0 | Status: DISCONTINUED | OUTPATIENT
Start: 2023-05-13 | End: 2023-05-17

## 2023-05-13 RX ADMIN — Medication 200 MILLIGRAM(S): at 23:25

## 2023-05-13 RX ADMIN — PANTOPRAZOLE SODIUM 40 MILLIGRAM(S): 20 TABLET, DELAYED RELEASE ORAL at 11:56

## 2023-05-13 RX ADMIN — Medication 40 MILLIGRAM(S): at 06:28

## 2023-05-13 RX ADMIN — Medication 25 MILLIGRAM(S): at 17:34

## 2023-05-13 RX ADMIN — ISOSORBIDE DINITRATE 10 MILLIGRAM(S): 5 TABLET ORAL at 21:46

## 2023-05-13 RX ADMIN — ISOSORBIDE DINITRATE 10 MILLIGRAM(S): 5 TABLET ORAL at 09:09

## 2023-05-13 RX ADMIN — Medication 40 MILLIGRAM(S): at 17:34

## 2023-05-13 RX ADMIN — Medication 1 TABLET(S): at 21:45

## 2023-05-13 RX ADMIN — Medication 40 MILLIEQUIVALENT(S): at 09:01

## 2023-05-13 RX ADMIN — Medication 200 MILLIGRAM(S): at 23:55

## 2023-05-13 RX ADMIN — ENOXAPARIN SODIUM 40 MILLIGRAM(S): 100 INJECTION SUBCUTANEOUS at 11:56

## 2023-05-13 RX ADMIN — CEFTRIAXONE 100 MILLIGRAM(S): 500 INJECTION, POWDER, FOR SOLUTION INTRAMUSCULAR; INTRAVENOUS at 09:01

## 2023-05-13 RX ADMIN — Medication 25 MILLIGRAM(S): at 06:28

## 2023-05-13 RX ADMIN — Medication 1: at 21:43

## 2023-05-13 RX ADMIN — Medication 25 MILLIGRAM(S): at 11:56

## 2023-05-13 RX ADMIN — ISOSORBIDE DINITRATE 10 MILLIGRAM(S): 5 TABLET ORAL at 15:49

## 2023-05-13 RX ADMIN — Medication 1: at 17:38

## 2023-05-13 RX ADMIN — AZITHROMYCIN 500 MILLIGRAM(S): 500 TABLET, FILM COATED ORAL at 11:56

## 2023-05-13 NOTE — PROGRESS NOTE ADULT - SUBJECTIVE AND OBJECTIVE BOX
PROGRESS NOTE:   Authoted by Dr. Yuriy Juan MD, LETI  Available via [employer] Teams     Patient is a 77y old  Male who presents with a chief complaint of CHF (13 May 2023 10:50)      SUBJECTIVE / OVERNIGHT EVENTS:  No acute events overnight   No subjective complaints    MEDICATIONS  (STANDING):  azithromycin   Tablet 500 milliGRAM(s) Oral daily  cefTRIAXone   IVPB 1000 milliGRAM(s) IV Intermittent every 24 hours  enoxaparin Injectable 40 milliGRAM(s) SubCutaneous every 24 hours  furosemide   Injectable 40 milliGRAM(s) IV Push <User Schedule>  hydrALAZINE 25 milliGRAM(s) Oral <User Schedule>  insulin lispro (ADMELOG) corrective regimen sliding scale   SubCutaneous Before meals and at bedtime  isosorbide   dinitrate Tablet (ISORDIL) 10 milliGRAM(s) Oral <User Schedule>  pantoprazole    Tablet 40 milliGRAM(s) Oral before breakfast    MEDICATIONS  (PRN):  aluminum hydroxide/magnesium hydroxide/simethicone Suspension 30 milliLiter(s) Oral every 4 hours PRN Dyspepsia  ipratropium    for Nebulization 500 MICROGram(s) Nebulizer every 6 hours PRN Shortness of Breath and/or Wheezing      OBJECTIVE:  Vital Signs Last 24 Hrs  T(C): 36.2 (13 May 2023 12:31), Max: 36.7 (12 May 2023 18:22)  T(F): 97.1 (13 May 2023 12:31), Max: 98.1 (12 May 2023 18:22)  HR: 96 (13 May 2023 14:49) (84 - 96)  BP: 139/95 (13 May 2023 14:49) (126/89 - 139/95)  BP(mean): 101 (12 May 2023 20:42) (101 - 101)  RR: 18 (13 May 2023 16:51) (15 - 27)  SpO2: 97% (13 May 2023 16:51) (92% - 98%)    Parameters below as of 13 May 2023 16:51  Patient On (Oxygen Delivery Method): nasal cannula  O2 Flow (L/min): 2    I&O's Summary    12 May 2023 07:01  -  13 May 2023 07:00  --------------------------------------------------------  IN: 700 mL / OUT: 1220 mL / NET: -520 mL    13 May 2023 07:01  -  13 May 2023 16:57  --------------------------------------------------------  IN: 360 mL / OUT: 600 mL / NET: -240 mL        CONSTITUTIONAL: NAD, well-developed  HEAD:  Atraumatic, Normocephalic  EYES: EOMI, conjunctiva and sclera clear  ENMT: No tonsillar erythema, exudates, or enlargement; Moist mucous membranes  NECK: Supple, +JVD  NERVOUS SYSTEM: AOX3, motor and sensation grossly intact in b/l UE and b/l LE  PSYCHIATRIC: Appropriate affect and mood  CHEST/LUNG: Clear to auscultation bilaterally; No rales, rhonchi, wheezing, or rubs  HEART: Regular rate and rhythm; No murmurs, rubs, or gallops. No LE edema  ABDOMEN: Soft, Nontender, Nondistended; Bowel sounds present  EXTREMITIES:  2+ Peripheral Pulses, No clubbing, cyanosis  SKIN: No rashes or lesions    LABS:                        13.5   10.99 )-----------( 173      ( 13 May 2023 05:30 )             42.2     05-13    137  |  96  |  34<H>  ----------------------------<  100<H>  3.6   |  29  |  0.94    Ca    8.4      13 May 2023 05:30  Mg     2.0     05-13    TPro  5.6<L>  /  Alb  3.2<L>  /  TBili  1.6<H>  /  DBili  x   /  AST  111<H>  /  ALT  302<H>  /  AlkPhos  101  05-13              CAPILLARY BLOOD GLUCOSE      POCT Blood Glucose.: 134 mg/dL (13 May 2023 11:35)  POCT Blood Glucose.: 97 mg/dL (13 May 2023 06:19)  POCT Blood Glucose.: 114 mg/dL (12 May 2023 21:30)  POCT Blood Glucose.: 155 mg/dL (12 May 2023 17:04)      RADIOLOGY & ADDITIONAL TESTS:

## 2023-05-13 NOTE — PROGRESS NOTE ADULT - PROBLEM SELECTOR PLAN 1
-TTE 5/12: Dilated LV w/ severely reduced LVSF, EF 15-20% w/ global hypokinesis. Gr III LV diastolic dysfxn. Dil. RV. Reduced RVSF. Biatrial enlargemt. Mod MR; Mild-to-mod TR.  PASP 39mmHg. Small pericard effusn.  -consider Advanced CHF team   -BNP 22K--> 12K   -5/9 CTA Chest w/ B/L pleural effusion (R>L); CXR 5/11 AM: Small R Pleff minimally increased or shifting. 5/12 POCUS by Pulm w/ diffuse panlobar B-lines  -i/O: net neg 520ml/24H, c/w Lasix to 40mg IV BID-- trialed Metolazone x1 5/12; not accurately tracked- unable to place condom cath due to anatomy. Obtain daily standing weights: neg 2.0kg today 5/13 since admission.  -Started Isordil 10mg TID and Hydral 25mg PO TID 5/12  -Consider ischemic w/u when euvolemic  -Consider ace/arb, bb prior to dc   -Core measure, daily weight, strict I&Os and 1.2L fluid restriction    O2  -Desats noted on Tele overnight, suspect c/b component of MARGARITA as well- pt did not tolerate CPAP 5/10 ovn; pt did tolerate 5/11 ovn however still w/ Desats;  send for sleep study upon discharge  -Satting well on 2LNC 5/12, intermittently pulls NC off and desats. Desat on Room air to upper 70s and mid 80s.  -D/w CM- started process for Home O2 5/12  -Pulmonary consulted 5/12

## 2023-05-13 NOTE — PROGRESS NOTE ADULT - ASSESSMENT
77-year-old male with a PMHx of HTN, DMII, former alcohol/tobacco abuse, depression and CHF who presented with acute on chronic heart failure exacerbation.         #Acute on Chronic HF Exacerbation     -further management as per cardiology      -TTE pending      -currently on IV Lasix 40mg BID diuresing well      #Acute Hypoxic Respiratory Failure   -see above for diuretic plan  -pulm consulted, recs appreciated     #Concern for CAP    - low suspicion for active bacterial process per pulm  -no consolidation on POCUS by pulm, can d/c IV abx or transition to PO to complete course.     #Transaminitis     -likely congestive hepatopathy, expect improvement with diuresis    -US showed enlarged steatotic liver and trace ascites    -hepatitis panel negative      #"Upset Stomach" (Dyspepsia?, Gastritis?)    -Hpylori neg,  - improving with protonix     #Pre-Diabetes (A1c 6.1%)    -ISS for now, needs outpatient follow up for continued management      #Concern for MARGARITA   -CPAP at night if patient tolerated   -outpatient follow up      DVT PPx: Lovenox     Dispo: pending clinical improvement , tentative home PT

## 2023-05-13 NOTE — PROGRESS NOTE ADULT - PROBLEM SELECTOR PLAN 5
SBP 120s-30s  -Pt endorses prev on Lisinopril and HCTZ, however has not taken for many yrs  -Continue Lasix 40mg IV BID  -Started Isordil 10mg TID and Hydral 25mg PO TID 5/12 - improved BPs  -Consider restarting Lisinopril or arb, starting bb, prior to d/c i/s/o CHF

## 2023-05-13 NOTE — PROGRESS NOTE ADULT - ASSESSMENT
77M, former smoker, h/o medication non compliance (follows holistic medicine only) and no medical f/u, w/ PMHx of HTN, DM-II, CHF (EF unknown, dx 2021 @ [facility]), s/p R hip surgery 2021 and Depression, presented to [facility] c/o dull abd pain/CP w/ associated SOB and orthopnea, pt admitted to cardiac tele for management of acute CHF and underlying CAP, hospital course also significant for transaminitis.

## 2023-05-13 NOTE — PROGRESS NOTE ADULT - PROBLEM SELECTOR PLAN 4
Possibly i/s/o ascites above, vs. gastric related as reports frequently taking Excedrin in the past  - 5/11- D/w Medicine, will send stool H.Pylori Antigen test, then once sent can start PPI trial (protonix po 40mg qd)  - 5/12- stool not yet collected, however no abd pain or tenderness today- as per Med, lower threshold to test hpylori if pt continues to deny abd pain  -5/13- reports mild stomach discomfort today, had normal BM. however pt did not save for collection- will f/u next BM Possibly i/s/o ascites above, vs. gastric related as reports frequently taking Excedrin in the past  - 5/11- D/w Medicine, will send stool H.Pylori Antigen test, then once sent can start PPI trial (protonix po 40mg qd)  - 5/12- stool not yet collected, however no abd pain or tenderness today- as per Med, lower threshold to test hpylori if pt continues to deny abd pain  -5/13- reports mild stomach discomfort today, had normal BM. Hpylori stool sample received in lab- Will start PPI trial

## 2023-05-13 NOTE — PROGRESS NOTE ADULT - SUBJECTIVE AND OBJECTIVE BOX
Interventional Cardiology PA Adult Progress Note    Subjective Assessment: Patient seen and examined at bedside.   	  MEDICATIONS:  furosemide   Injectable 40 milliGRAM(s) IV Push <User Schedule>  hydrALAZINE 25 milliGRAM(s) Oral <User Schedule>  isosorbide   dinitrate Tablet (ISORDIL) 10 milliGRAM(s) Oral <User Schedule>  azithromycin   Tablet 500 milliGRAM(s) Oral daily  cefTRIAXone   IVPB 1000 milliGRAM(s) IV Intermittent every 24 hours  ipratropium    for Nebulization 500 MICROGram(s) Nebulizer every 6 hours PRN  aluminum hydroxide/magnesium hydroxide/simethicone Suspension 30 milliLiter(s) Oral every 4 hours PRN  insulin lispro (ADMELOG) corrective regimen sliding scale   SubCutaneous Before meals and at bedtime  enoxaparin Injectable 40 milliGRAM(s) SubCutaneous every 24 hours      [PHYSICAL EXAM:  TELEMETRY: few couplets; desats overnight lowest to 84%  T(C): 36.2 (05-13-23 @ 09:54), Max: 36.7 (05-12-23 @ 18:22)  HR: 90 (05-13-23 @ 08:37) (84 - 94)  BP: 135/86 (05-13-23 @ 08:37) (126/89 - 152/94)  RR: 18 (05-13-23 @ 09:14) (15 - 27)  SpO2: 93% (05-13-23 @ 09:14) (92% - 99%)  Wt(kg): --  I&O's Summary    12 May 2023 07:01  -  13 May 2023 07:00  --------------------------------------------------------  IN: 700 mL / OUT: 1220 mL / NET: -520 mL    13 May 2023 07:01  -  13 May 2023 10:51  --------------------------------------------------------  IN: 180 mL / OUT: 0 mL / NET: 180 mL                                  Appearance: Pt seen in bed, elderly male, comfortably sitting in bed  HEENT:   NCAT;  PERRL, EOMI	  Neck: Supple, + JVD   Cardiovascular: Normal S1 S2,  No murmurs  Respiratory: bibasilar crackles; comfortable breathing today improved from prior. Found pt without NC on (frequently pulls off) - satting 80s. NC 3L improved to 96% on exam  Gastrointestinal:  Soft NTND, +normoactive BS	  Skin: No rashes or cyanosis appreciated  Extremities: Normal range of motion, No clubbing, cyanosis or edema  Vascular: Peripheral pulses palpable 2+ bilaterally  Neurologic: Non-focal  Psychiatry: A & O x 3, Mood & affect appropriate   	      LABS:	 	  CARDIAC MARKERS:                        13.5   10.99 )-----------( 173      ( 13 May 2023 05:30 )             42.2     05-13    137  |  96  |  34<H>  ----------------------------<  100<H>  3.6   |  29  |  0.94    Ca    8.4      13 May 2023 05:30  Mg     2.0     05-13    TPro  5.6<L>  /  Alb  3.2<L>  /  TBili  1.6<H>  /  DBili  x   /  AST  111<H>  /  ALT  302<H>  /  AlkPhos  101  05-13    proBNP:   Lipid Profile:   HgA1c:   TSH:        Interventional Cardiology PA Adult Progress Note    Subjective Assessment: Patient seen and examined at bedside. Reports improvement in dyspnea. Endorses vague "Stomach upset" - endorses normal BM, denies n/v/d, denies blood in stool. Denies CP, orthopnea, PND, cough.  	  MEDICATIONS:  furosemide   Injectable 40 milliGRAM(s) IV Push <User Schedule>  hydrALAZINE 25 milliGRAM(s) Oral <User Schedule>  isosorbide   dinitrate Tablet (ISORDIL) 10 milliGRAM(s) Oral <User Schedule>  azithromycin   Tablet 500 milliGRAM(s) Oral daily  cefTRIAXone   IVPB 1000 milliGRAM(s) IV Intermittent every 24 hours  ipratropium    for Nebulization 500 MICROGram(s) Nebulizer every 6 hours PRN  aluminum hydroxide/magnesium hydroxide/simethicone Suspension 30 milliLiter(s) Oral every 4 hours PRN  insulin lispro (ADMELOG) corrective regimen sliding scale   SubCutaneous Before meals and at bedtime  enoxaparin Injectable 40 milliGRAM(s) SubCutaneous every 24 hours      [PHYSICAL EXAM:  TELEMETRY: few couplets; desats overnight lowest to 84%  T(C): 36.2 (05-13-23 @ 09:54), Max: 36.7 (05-12-23 @ 18:22)  HR: 90 (05-13-23 @ 08:37) (84 - 94)  BP: 135/86 (05-13-23 @ 08:37) (126/89 - 152/94)  RR: 18 (05-13-23 @ 09:14) (15 - 27)  SpO2: 93% (05-13-23 @ 09:14) (92% - 99%)  Wt(kg): --  I&O's Summary    12 May 2023 07:01  -  13 May 2023 07:00  --------------------------------------------------------  IN: 700 mL / OUT: 1220 mL / NET: -520 mL    13 May 2023 07:01  -  13 May 2023 10:51  --------------------------------------------------------  IN: 180 mL / OUT: 0 mL / NET: 180 mL                                  Appearance: Pt seen in bed, elderly male, comfortably sitting in bed  HEENT:   NCAT;  PERRL, EOMI	  Neck: Supple, + JVD   Cardiovascular: Normal S1 S2,  No murmurs  Respiratory: bibasilar crackles; comfortable breathing today improved from prior. Found pt without NC on (frequently pulls off) - satting 80s. NC 3L improved to 96% on exam  Gastrointestinal:  Soft NTND, +normoactive BS	  Skin: No rashes or cyanosis appreciated  Extremities: Normal range of motion, No clubbing, cyanosis or edema  Vascular: Peripheral pulses palpable 2+ bilaterally  Neurologic: Non-focal  Psychiatry: A & O x 3, Mood & affect appropriate   	      LABS:	 	  CARDIAC MARKERS:                        13.5   10.99 )-----------( 173      ( 13 May 2023 05:30 )             42.2     05-13    137  |  96  |  34<H>  ----------------------------<  100<H>  3.6   |  29  |  0.94    Ca    8.4      13 May 2023 05:30  Mg     2.0     05-13    TPro  5.6<L>  /  Alb  3.2<L>  /  TBili  1.6<H>  /  DBili  x   /  AST  111<H>  /  ALT  302<H>  /  AlkPhos  101  05-13    proBNP:   Lipid Profile:   HgA1c:   TSH:

## 2023-05-13 NOTE — PROGRESS NOTE ADULT - PROBLEM SELECTOR PLAN 3
presents w/ dull abd/epigastric pain- not endorsing any abd pain today  -likely hepatic congestion i/s/o acute CHF  -AST/ALT continue to be elevated at 262/470 -  trend daily  -Bilirubin uptrending- d/w Medicine- rec check Lipase: mildly elevated to 89   -5/11 Abd US: Enlarged steatotic liver. Trace ascites and Rt pleural effusion,  -Hep panel neg  -Avoid Tylenol and hepatotoxic agents

## 2023-05-14 LAB
ALBUMIN SERPL ELPH-MCNC: 3.2 G/DL — LOW (ref 3.3–5)
ALP SERPL-CCNC: 91 U/L — SIGNIFICANT CHANGE UP (ref 40–120)
ALT FLD-CCNC: 225 U/L — HIGH (ref 10–45)
ANION GAP SERPL CALC-SCNC: 10 MMOL/L — SIGNIFICANT CHANGE UP (ref 5–17)
AST SERPL-CCNC: 65 U/L — HIGH (ref 10–40)
BILIRUB DIRECT SERPL-MCNC: 0.4 MG/DL — HIGH (ref 0–0.3)
BILIRUB INDIRECT FLD-MCNC: 0.7 MG/DL — SIGNIFICANT CHANGE UP (ref 0.2–1)
BILIRUB SERPL-MCNC: 1.1 MG/DL — SIGNIFICANT CHANGE UP (ref 0.2–1.2)
BILIRUB SERPL-MCNC: 1.2 MG/DL — SIGNIFICANT CHANGE UP (ref 0.2–1.2)
BUN SERPL-MCNC: 28 MG/DL — HIGH (ref 7–23)
CALCIUM SERPL-MCNC: 9 MG/DL — SIGNIFICANT CHANGE UP (ref 8.4–10.5)
CHLORIDE SERPL-SCNC: 91 MMOL/L — LOW (ref 96–108)
CO2 SERPL-SCNC: 31 MMOL/L — SIGNIFICANT CHANGE UP (ref 22–31)
CREAT SERPL-MCNC: 0.95 MG/DL — SIGNIFICANT CHANGE UP (ref 0.5–1.3)
EGFR: 82 ML/MIN/1.73M2 — SIGNIFICANT CHANGE UP
GLUCOSE BLDC GLUCOMTR-MCNC: 144 MG/DL — HIGH (ref 70–99)
GLUCOSE BLDC GLUCOMTR-MCNC: 160 MG/DL — HIGH (ref 70–99)
GLUCOSE BLDC GLUCOMTR-MCNC: 161 MG/DL — HIGH (ref 70–99)
GLUCOSE BLDC GLUCOMTR-MCNC: 170 MG/DL — HIGH (ref 70–99)
GLUCOSE SERPL-MCNC: 144 MG/DL — HIGH (ref 70–99)
HCT VFR BLD CALC: 41.9 % — SIGNIFICANT CHANGE UP (ref 39–50)
HGB BLD-MCNC: 13.8 G/DL — SIGNIFICANT CHANGE UP (ref 13–17)
LEGIONELLA AB SER-ACNC: <0.91 — SIGNIFICANT CHANGE UP (ref 0–0.9)
MAGNESIUM SERPL-MCNC: 1.8 MG/DL — SIGNIFICANT CHANGE UP (ref 1.6–2.6)
MCHC RBC-ENTMCNC: 32.9 GM/DL — SIGNIFICANT CHANGE UP (ref 32–36)
MCHC RBC-ENTMCNC: 33.1 PG — SIGNIFICANT CHANGE UP (ref 27–34)
MCV RBC AUTO: 100.5 FL — HIGH (ref 80–100)
NRBC # BLD: 0 /100 WBCS — SIGNIFICANT CHANGE UP (ref 0–0)
PLATELET # BLD AUTO: 188 K/UL — SIGNIFICANT CHANGE UP (ref 150–400)
POTASSIUM SERPL-MCNC: 3.4 MMOL/L — LOW (ref 3.5–5.3)
POTASSIUM SERPL-SCNC: 3.4 MMOL/L — LOW (ref 3.5–5.3)
PROT SERPL-MCNC: 5.6 G/DL — LOW (ref 6–8.3)
RBC # BLD: 4.17 M/UL — LOW (ref 4.2–5.8)
RBC # FLD: 14.8 % — HIGH (ref 10.3–14.5)
SODIUM SERPL-SCNC: 132 MMOL/L — LOW (ref 135–145)
WBC # BLD: 10 K/UL — SIGNIFICANT CHANGE UP (ref 3.8–10.5)
WBC # FLD AUTO: 10 K/UL — SIGNIFICANT CHANGE UP (ref 3.8–10.5)

## 2023-05-14 PROCEDURE — 99233 SBSQ HOSP IP/OBS HIGH 50: CPT

## 2023-05-14 RX ORDER — ONDANSETRON 8 MG/1
4 TABLET, FILM COATED ORAL ONCE
Refills: 0 | Status: COMPLETED | OUTPATIENT
Start: 2023-05-14 | End: 2023-05-14

## 2023-05-14 RX ORDER — POTASSIUM CHLORIDE 20 MEQ
20 PACKET (EA) ORAL ONCE
Refills: 0 | Status: DISCONTINUED | OUTPATIENT
Start: 2023-05-14 | End: 2023-05-14

## 2023-05-14 RX ORDER — MAGNESIUM OXIDE 400 MG ORAL TABLET 241.3 MG
800 TABLET ORAL ONCE
Refills: 0 | Status: COMPLETED | OUTPATIENT
Start: 2023-05-14 | End: 2023-05-14

## 2023-05-14 RX ORDER — POTASSIUM CHLORIDE 20 MEQ
40 PACKET (EA) ORAL ONCE
Refills: 0 | Status: COMPLETED | OUTPATIENT
Start: 2023-05-14 | End: 2023-05-14

## 2023-05-14 RX ORDER — POTASSIUM CHLORIDE 20 MEQ
20 PACKET (EA) ORAL ONCE
Refills: 0 | Status: COMPLETED | OUTPATIENT
Start: 2023-05-14 | End: 2023-05-14

## 2023-05-14 RX ADMIN — Medication 30 MILLILITER(S): at 11:48

## 2023-05-14 RX ADMIN — Medication 20 MILLIEQUIVALENT(S): at 09:51

## 2023-05-14 RX ADMIN — PANTOPRAZOLE SODIUM 40 MILLIGRAM(S): 20 TABLET, DELAYED RELEASE ORAL at 06:17

## 2023-05-14 RX ADMIN — ISOSORBIDE DINITRATE 10 MILLIGRAM(S): 5 TABLET ORAL at 09:52

## 2023-05-14 RX ADMIN — Medication 1: at 17:48

## 2023-05-14 RX ADMIN — Medication 40 MILLIEQUIVALENT(S): at 09:52

## 2023-05-14 RX ADMIN — Medication 25 MILLIGRAM(S): at 11:48

## 2023-05-14 RX ADMIN — ISOSORBIDE DINITRATE 10 MILLIGRAM(S): 5 TABLET ORAL at 22:08

## 2023-05-14 RX ADMIN — Medication 40 MILLIGRAM(S): at 17:40

## 2023-05-14 RX ADMIN — Medication 25 MILLIGRAM(S): at 18:49

## 2023-05-14 RX ADMIN — Medication 30 MILLILITER(S): at 22:08

## 2023-05-14 RX ADMIN — Medication 40 MILLIGRAM(S): at 06:17

## 2023-05-14 RX ADMIN — Medication 1 TABLET(S): at 11:48

## 2023-05-14 RX ADMIN — CEFTRIAXONE 100 MILLIGRAM(S): 500 INJECTION, POWDER, FOR SOLUTION INTRAMUSCULAR; INTRAVENOUS at 09:51

## 2023-05-14 RX ADMIN — Medication 1: at 06:58

## 2023-05-14 RX ADMIN — Medication 1: at 12:18

## 2023-05-14 RX ADMIN — AZITHROMYCIN 500 MILLIGRAM(S): 500 TABLET, FILM COATED ORAL at 11:48

## 2023-05-14 RX ADMIN — ISOSORBIDE DINITRATE 10 MILLIGRAM(S): 5 TABLET ORAL at 13:25

## 2023-05-14 RX ADMIN — Medication 25 MILLIGRAM(S): at 06:17

## 2023-05-14 RX ADMIN — ONDANSETRON 4 MILLIGRAM(S): 8 TABLET, FILM COATED ORAL at 14:56

## 2023-05-14 RX ADMIN — ENOXAPARIN SODIUM 40 MILLIGRAM(S): 100 INJECTION SUBCUTANEOUS at 12:20

## 2023-05-14 RX ADMIN — MAGNESIUM OXIDE 400 MG ORAL TABLET 800 MILLIGRAM(S): 241.3 TABLET ORAL at 09:51

## 2023-05-14 NOTE — PROGRESS NOTE ADULT - ASSESSMENT
77-year-old male with a PMHx of HTN, DMII, former alcohol/tobacco abuse, depression and CHF who presented with acute on chronic heart failure exacerbation.         #Acute on Chronic HF Exacerbation     -further management as per cardiology      -TTE: Dilated LV w/ severely reduced LVSF, EF 15-20% w/ global hypokinesis.  -currently on IV Lasix 40mg BID diuresing well      #Acute Hypoxic Respiratory Failure   -see above for diuretic plan  -pulm consulted, recs appreciated     #Concern for CAP    - low suspicion for active bacterial process per pulm  -no consolidation on POCUS by pulm, abx discontinued     #Transaminitis     -likely congestive hepatopathy, improving with diuresis    -US showed enlarged steatotic liver and trace ascites    -hepatitis panel negative      #"Upset Stomach" (Dyspepsia?, Gastritis?)    H-pylori neg,  - improving with protonix     #Pre-Diabetes (A1c 6.1%)    -ISS for now, needs outpatient follow up for continued management      #Concern for MARGARITA   -CPAP at night if patient tolerated   -outpatient follow up      DVT PPx: Lovenox     Dispo: pending clinical improvement , tentative home PT

## 2023-05-14 NOTE — PROGRESS NOTE ADULT - PROBLEM SELECTOR PLAN 6
A1c 6.1, prev on Metformin   - Continue mISS    F: None  E: Replete if K<4 or Mag<2  N: DASH Diet, 1.2L fluid restriction  VTEppx: Lovenox  Dispo: pending clinical progression  F/u CM for Home O2- process started 5/12    Above discussed with Dr. Casper

## 2023-05-14 NOTE — PROGRESS NOTE ADULT - PROBLEM SELECTOR PLAN 3
presents w/ dull abd/epigastric pain- not endorsing any abd pain today  - likely hepatic congestion i/s/o acute CHF  - Abd US 05/11: Enlarged steatotic liver. Trace ascites and Rt pleural effusion,  - AST/ALT continue to be elevated at 262/470 ; improving 65/225 on 05/14  - Bilirubin uptrending- d/w Medicine- rec check Lipase: mildly elevated to 89   - Hep panel neg  - Avoid Tylenol and hepatotoxic agents presents w/ dull abd/epigastric pain- not endorsing any abd pain today  - likely hepatic congestion i/s/o acute CHF  - Abd US 05/11: Enlarged steatotic liver. Trace ascites and Rt pleural effusion  - AST/ALT continue to be elevated at 262/470 ; improving 65/225 on 05/14  - Bilirubin uptrending- d/w Medicine- rec check Lipase: mildly elevated to 89   - Hep panel neg  - Avoid Tylenol and hepatotoxic agents

## 2023-05-14 NOTE — PROGRESS NOTE ADULT - ASSESSMENT
77M, former smoker, h/o medication non compliance (follows holistic medicine only) and no medical f/u, w/ PMHx of HTN, DM-II, CHF (EF unknown, dx 2021 @ [facility]), s/p R hip surgery 2021 and Depression, presented to [facility] c/o dull abd pain/CP w/ associated SOB and orthopnea, pt admitted to cardiac tele for management of acute CHF and underlying CAP, hospital course also significant for transaminitis. 77M, former smoker, h/o medication non compliance (follows holistic medicine only) and no medical f/u, w/ PMHx of HTN, DM-II, CHF (EF unknown, dx 2021 @ [facility]), s/p R hip surgery 2021 and Depression, presented to [facility] c/o dull abd pain/CP w/ associated SOB and orthopnea, pt admitted to cardiac tele for management of acute CHF on IV Lasix and underlying CAP (s/p IV ABX), hospital course also significant for transaminitis.

## 2023-05-14 NOTE — PROGRESS NOTE ADULT - SUBJECTIVE AND OBJECTIVE BOX
Cardiology PA Adult Progress Note    SUBJECTIVE ASSESSMENT:  Patient seen and examined at bedside this AM in NAD w/3L NC. Patient states that he feels somewhat SOB but otherwise feels "so-so" and somewhat improving from when he was initially admitted. Denies CP, n/v, lightheadedness, dizziness, syncope.   	  MEDICATIONS:  furosemide   Injectable 40 milliGRAM(s) IV Push <User Schedule>  hydrALAZINE 25 milliGRAM(s) Oral <User Schedule>  isosorbide   dinitrate Tablet (ISORDIL) 10 milliGRAM(s) Oral <User Schedule>  azithromycin   Tablet 500 milliGRAM(s) Oral daily  ipratropium    for Nebulization 500 MICROGram(s) Nebulizer every 6 hours PRN  aluminum hydroxide/magnesium hydroxide/simethicone Suspension 30 milliLiter(s) Oral every 4 hours PRN  pantoprazole    Tablet 40 milliGRAM(s) Oral before breakfast  insulin lispro (ADMELOG) corrective regimen sliding scale   SubCutaneous Before meals and at bedtime  enoxaparin Injectable 40 milliGRAM(s) SubCutaneous every 24 hours    	  VITAL SIGNS:  T(C): 36.1 (05-14-23 @ 09:30), Max: 36.8 (05-13-23 @ 18:12)  HR: 86 (05-14-23 @ 08:32) (86 - 103)  BP: 111/76 (05-14-23 @ 08:32) (111/76 - 139/95)  RR: 22 (05-14-23 @ 08:32) (16 - 22)  SpO2: 95% (05-14-23 @ 08:32) (92% - 99%)      I&O's Summary  13 May 2023 07:01  -  14 May 2023 07:00  --------------------------------------------------------  IN: 1080 mL / OUT: 2100 mL / NET: -1020 mL    14 May 2023 07:01  -  14 May 2023 11:26  --------------------------------------------------------  IN: 180 mL / OUT: 300 mL / NET: -120 mL                                           PHYSICAL EXAM:  Appearance: Normal	  HEENT: Normal oral mucosa, PERRL, EOMI	  Neck: Supple, - JVD  Cardiovascular: Normal S1 S2, No murmurs  Respiratory: on 3L NC, bibasilar crackles; increased WOB with tachypnea  Gastrointestinal:  Soft, Non-tender, + BS	  Skin: No rashes, No ecchymoses, No cyanosis  Extremities: Normal range of motion, No clubbing, cyanosis or edema  Vascular: Peripheral pulses palpable 2+ bilaterally  Neurologic: Non-focal  Psychiatry: A & O x 3, Mood & affect appropriate    LABS:	 	             13.8   10.00 )-----------( 188      ( 14 May 2023 07:01 )             41.9     05-14    132<L>  |  91<L>  |  28<H>  ----------------------------<  144<H>  3.4<L>   |  31  |  0.95    Ca    9.0      14 May 2023 07:01  Mg     1.8     05-14    TPro  5.6<L>  /  Alb  3.2<L>  /  TBili  1.2  /  DBili  0.4<H>  /  AST  65<H>  /  ALT  225<H>  /  AlkPhos  91  05-14    proBNP:   Lipid Profile:   HgA1c:   TSH:

## 2023-05-14 NOTE — PROGRESS NOTE ADULT - PROBLEM SELECTOR PLAN 4
Possibly i/s/o ascites above, vs. gastric related as reports frequently taking Excedrin in the past  - 5/11- D/w Medicine, will send stool H.Pylori Antigen test, then once sent can start PPI trial (protonix po 40mg qd)  - 5/12- stool not yet collected, however no abd pain or tenderness today- as per Med, lower threshold to test hpylori if pt continues to deny abd pain  - 5/13- reports mild stomach discomfort today, had normal BM. Hpylori stool sample received in lab  - started on PPI and pepto-bismol PRN Possibly i/s/o ascites above, vs. gastric related as reports frequently taking Excedrin in the past  - H pylori (-)  - started on probiotic, PPI and pepto-bismol PRN  - Avoid Excedrin/NSAID use Possibly i/s/o ascites above, vs. gastric related as reports frequently taking Excedrin in the past  - H pylori (-)  - Abd US 05/11: Enlarged steatotic liver. Trace ascites and Rt pleural effusion  - started on probiotic, PPI and pepto-bismol PRN  - Avoid Excedrin/NSAID use

## 2023-05-14 NOTE — PROGRESS NOTE ADULT - PROBLEM SELECTOR PLAN 5
SBP 120s-30s  - Pt endorses prev on Lisinopril and HCTZ, however has not taken for many yrs  - Continue Lasix 40mg IV BID, Isordil 10mg TID (started 5/12) and Hydral 25mg PO TID (started 5/12)  - Consider restarting Lisinopril or arb, starting bb, prior to d/c i/s/o CHF

## 2023-05-14 NOTE — PROGRESS NOTE ADULT - SUBJECTIVE AND OBJECTIVE BOX
PROGRESS NOTE:   Authoted by Dr. Yuriy Juan MD, LETI  Available via [employer] Teams     Patient is a 77y old  Male who presents with a chief complaint of CHF (14 May 2023 11:26)    SUBJECTIVE / OVERNIGHT EVENTS:  No acute events overnight   Reporting nausea     MEDICATIONS  (STANDING):  enoxaparin Injectable 40 milliGRAM(s) SubCutaneous every 24 hours  furosemide   Injectable 40 milliGRAM(s) IV Push <User Schedule>  hydrALAZINE 25 milliGRAM(s) Oral <User Schedule>  insulin lispro (ADMELOG) corrective regimen sliding scale   SubCutaneous Before meals and at bedtime  isosorbide   dinitrate Tablet (ISORDIL) 10 milliGRAM(s) Oral <User Schedule>  lactobacillus acidophilus 1 Tablet(s) Oral daily  ondansetron   Disintegrating Tablet 4 milliGRAM(s) Oral once  pantoprazole    Tablet 40 milliGRAM(s) Oral before breakfast    MEDICATIONS  (PRN):  aluminum hydroxide/magnesium hydroxide/simethicone Suspension 30 milliLiter(s) Oral every 4 hours PRN Dyspepsia  bismuth subsalicylate Liquid 15 milliLiter(s) Oral every 4 hours PRN Upset stomach  ipratropium    for Nebulization 500 MICROGram(s) Nebulizer every 6 hours PRN Shortness of Breath and/or Wheezing      OBJECTIVE:  Vital Signs Last 24 Hrs  T(C): 36.2 (14 May 2023 12:36), Max: 36.8 (13 May 2023 18:12)  T(F): 97.2 (14 May 2023 12:36), Max: 98.2 (13 May 2023 18:12)  HR: 86 (14 May 2023 11:47) (86 - 103)  BP: 115/75 (14 May 2023 11:47) (111/76 - 139/95)  BP(mean): 90 (14 May 2023 11:47) (86 - 99)  RR: 19 (14 May 2023 11:47) (16 - 22)  SpO2: 100% (14 May 2023 11:47) (92% - 100%)    Parameters below as of 14 May 2023 11:47  Patient On (Oxygen Delivery Method): nasal cannula  O2 Flow (L/min): 2    I&O's Summary    13 May 2023 07:01  -  14 May 2023 07:00  --------------------------------------------------------  IN: 1080 mL / OUT: 2100 mL / NET: -1020 mL    14 May 2023 07:01  -  14 May 2023 14:25  --------------------------------------------------------  IN: 360 mL / OUT: 600 mL / NET: -240 mL        CONSTITUTIONAL: tachypnea   HEAD:  Atraumatic, Normocephalic  EYES: EOMI, conjunctiva and sclera clear  ENMT: No tonsillar erythema, exudates, or enlargement; Moist mucous membranes  NECK: Supple, No JVD  NERVOUS SYSTEM: AOX3, motor and sensation grossly intact in b/l UE and b/l LE  PSYCHIATRIC: Appropriate affect and mood  CHEST/LUNG: Coarse breath sounds   HEART: Regular rate and rhythm; No murmurs, rubs, or gallops. No LE edema  ABDOMEN: Soft, Nontender, Nondistended; Bowel sounds present  EXTREMITIES:  2+ Peripheral Pulses, No clubbing, cyanosis  SKIN: No rashes or lesions    LABS:                        13.8   10.00 )-----------( 188      ( 14 May 2023 07:01 )             41.9     05-14    132<L>  |  91<L>  |  28<H>  ----------------------------<  144<H>  3.4<L>   |  31  |  0.95    Ca    9.0      14 May 2023 07:01  Mg     1.8     05-14    TPro  5.6<L>  /  Alb  3.2<L>  /  TBili  1.2  /  DBili  0.4<H>  /  AST  65<H>  /  ALT  225<H>  /  AlkPhos  91  05-14    CAPILLARY BLOOD GLUCOSE  POCT Blood Glucose.: 170 mg/dL (14 May 2023 11:59)  POCT Blood Glucose.: 161 mg/dL (14 May 2023 06:18)  POCT Blood Glucose.: 171 mg/dL (13 May 2023 21:14)  POCT Blood Glucose.: 159 mg/dL (13 May 2023 17:07)

## 2023-05-14 NOTE — PROGRESS NOTE ADULT - PROBLEM SELECTOR PLAN 1
p/w SOB, bibasilar rales requiring NC, no LE edema present  - BNP 22K--> 12K   - TTE 5/12: Dilated LV w/ severely reduced LVSF, EF 15-20% w/ global hypokinesis. Gr III LV diastolic dysfxn. Dil. RV. Reduced RVSF. Biatrial enlargement. Mod MR; Mild-to-mod TR.  PASP 39mmHg. Small pericardial effusion.  - CTA Chest 05/09: w/ B/L pleural effusion (R>L)  - CXR 5/11 AM: Small R Pleural eff minimally increased or shifting.   - POCUS 5/12: by Pulm w/ diffuse panlobar B-lines  - I/O: net neg 520ml/24H  - Diuretcis: c/w Lasix to 40mg IV BID-- trialed Metolazone x1 5/12  - GDMT: Started Isordil 10mg TID and Hydral 25mg PO TID 5/12  - PLAN: Consider ischemic w/u when euvolemic. Consider ace/arb, bb prior to dc   -Core measure, daily weight, strict I&Os and 1.2L fluid restriction    #Hypoxia  - Desats noted on Tele overnight, suspect c/b component of MARGARITA as well  - pt did not tolerate CPAP 5/10 ovn; pt did tolerate 5/11 ovn however still w/ Desats  - Satting well on 3LNC, intermittently pulls NC off and desats to upper 70s and mid 80s  - Pulmonary consulted 5/12  - PLAN: send for sleep study upon discharge, d/w CM- started process for Home O2 5/12

## 2023-05-14 NOTE — PROGRESS NOTE ADULT - PROBLEM SELECTOR PLAN 2
presents w/ SOB, afebrile, WBC 14 w/ left shift and non toxic appearing  - Procal 0.36--> .27 ; CRP elevated  - ESR wnl, Lactate wnl; COVID/RVP (-), urine Legionella (-), strep pneumo (-)  - CTA Chest 05/09: w/ ground glass opacities, neg for PE  - CXR 05/11: as above  - Pulm following, apprec recs  - C/w Azithro 500mg QD x 5days and CTX 1g QD x 5days abx total (5/10-5/14)

## 2023-05-15 LAB
ALBUMIN SERPL ELPH-MCNC: 3.5 G/DL — SIGNIFICANT CHANGE UP (ref 3.3–5)
ALP SERPL-CCNC: 86 U/L — SIGNIFICANT CHANGE UP (ref 40–120)
ALT FLD-CCNC: 184 U/L — HIGH (ref 10–45)
ANION GAP SERPL CALC-SCNC: 9 MMOL/L — SIGNIFICANT CHANGE UP (ref 5–17)
AST SERPL-CCNC: 58 U/L — HIGH (ref 10–40)
BILIRUB SERPL-MCNC: 0.8 MG/DL — SIGNIFICANT CHANGE UP (ref 0.2–1.2)
BUN SERPL-MCNC: 32 MG/DL — HIGH (ref 7–23)
CALCIUM SERPL-MCNC: 8.6 MG/DL — SIGNIFICANT CHANGE UP (ref 8.4–10.5)
CHLORIDE SERPL-SCNC: 95 MMOL/L — LOW (ref 96–108)
CO2 SERPL-SCNC: 31 MMOL/L — SIGNIFICANT CHANGE UP (ref 22–31)
CREAT SERPL-MCNC: 0.86 MG/DL — SIGNIFICANT CHANGE UP (ref 0.5–1.3)
EGFR: 89 ML/MIN/1.73M2 — SIGNIFICANT CHANGE UP
GLUCOSE BLDC GLUCOMTR-MCNC: 134 MG/DL — HIGH (ref 70–99)
GLUCOSE BLDC GLUCOMTR-MCNC: 152 MG/DL — HIGH (ref 70–99)
GLUCOSE BLDC GLUCOMTR-MCNC: 176 MG/DL — HIGH (ref 70–99)
GLUCOSE BLDC GLUCOMTR-MCNC: 242 MG/DL — HIGH (ref 70–99)
GLUCOSE SERPL-MCNC: 133 MG/DL — HIGH (ref 70–99)
HCT VFR BLD CALC: 41.7 % — SIGNIFICANT CHANGE UP (ref 39–50)
HGB BLD-MCNC: 14 G/DL — SIGNIFICANT CHANGE UP (ref 13–17)
MAGNESIUM SERPL-MCNC: 1.9 MG/DL — SIGNIFICANT CHANGE UP (ref 1.6–2.6)
MCHC RBC-ENTMCNC: 33 PG — SIGNIFICANT CHANGE UP (ref 27–34)
MCHC RBC-ENTMCNC: 33.6 GM/DL — SIGNIFICANT CHANGE UP (ref 32–36)
MCV RBC AUTO: 98.3 FL — SIGNIFICANT CHANGE UP (ref 80–100)
NRBC # BLD: 0 /100 WBCS — SIGNIFICANT CHANGE UP (ref 0–0)
PLATELET # BLD AUTO: 227 K/UL — SIGNIFICANT CHANGE UP (ref 150–400)
POTASSIUM SERPL-MCNC: 4.2 MMOL/L — SIGNIFICANT CHANGE UP (ref 3.5–5.3)
POTASSIUM SERPL-SCNC: 4.2 MMOL/L — SIGNIFICANT CHANGE UP (ref 3.5–5.3)
PROT SERPL-MCNC: 6.1 G/DL — SIGNIFICANT CHANGE UP (ref 6–8.3)
RBC # BLD: 4.24 M/UL — SIGNIFICANT CHANGE UP (ref 4.2–5.8)
RBC # FLD: 14.6 % — HIGH (ref 10.3–14.5)
SODIUM SERPL-SCNC: 135 MMOL/L — SIGNIFICANT CHANGE UP (ref 135–145)
WBC # BLD: 10.29 K/UL — SIGNIFICANT CHANGE UP (ref 3.8–10.5)
WBC # FLD AUTO: 10.29 K/UL — SIGNIFICANT CHANGE UP (ref 3.8–10.5)

## 2023-05-15 PROCEDURE — 99232 SBSQ HOSP IP/OBS MODERATE 35: CPT

## 2023-05-15 PROCEDURE — 99232 SBSQ HOSP IP/OBS MODERATE 35: CPT | Mod: GC

## 2023-05-15 PROCEDURE — 99233 SBSQ HOSP IP/OBS HIGH 50: CPT

## 2023-05-15 RX ORDER — SACUBITRIL AND VALSARTAN 24; 26 MG/1; MG/1
1 TABLET, FILM COATED ORAL
Refills: 0 | Status: DISCONTINUED | OUTPATIENT
Start: 2023-05-15 | End: 2023-05-16

## 2023-05-15 RX ORDER — DAPAGLIFLOZIN 10 MG/1
1 TABLET, FILM COATED ORAL
Qty: 30 | Refills: 0
Start: 2023-05-15

## 2023-05-15 RX ORDER — ASPIRIN/CALCIUM CARB/MAGNESIUM 324 MG
325 TABLET ORAL ONCE
Refills: 0 | Status: COMPLETED | OUTPATIENT
Start: 2023-05-16 | End: 2023-05-16

## 2023-05-15 RX ORDER — SPIRONOLACTONE 25 MG/1
25 TABLET, FILM COATED ORAL DAILY
Refills: 0 | Status: DISCONTINUED | OUTPATIENT
Start: 2023-05-15 | End: 2023-05-17

## 2023-05-15 RX ORDER — SACUBITRIL AND VALSARTAN 24; 26 MG/1; MG/1
1 TABLET, FILM COATED ORAL
Qty: 60 | Refills: 0
Start: 2023-05-15 | End: 2023-06-13

## 2023-05-15 RX ORDER — CLOPIDOGREL BISULFATE 75 MG/1
600 TABLET, FILM COATED ORAL ONCE
Refills: 0 | Status: COMPLETED | OUTPATIENT
Start: 2023-05-16 | End: 2023-05-16

## 2023-05-15 RX ORDER — FUROSEMIDE 40 MG
40 TABLET ORAL ONCE
Refills: 0 | Status: COMPLETED | OUTPATIENT
Start: 2023-05-15 | End: 2023-05-15

## 2023-05-15 RX ADMIN — Medication 2: at 12:42

## 2023-05-15 RX ADMIN — Medication 1: at 17:03

## 2023-05-15 RX ADMIN — Medication 1 TABLET(S): at 12:40

## 2023-05-15 RX ADMIN — Medication 1: at 22:17

## 2023-05-15 RX ADMIN — Medication 40 MILLIGRAM(S): at 17:05

## 2023-05-15 RX ADMIN — Medication 25 MILLIGRAM(S): at 12:42

## 2023-05-15 RX ADMIN — ENOXAPARIN SODIUM 40 MILLIGRAM(S): 100 INJECTION SUBCUTANEOUS at 12:43

## 2023-05-15 RX ADMIN — PANTOPRAZOLE SODIUM 40 MILLIGRAM(S): 20 TABLET, DELAYED RELEASE ORAL at 06:24

## 2023-05-15 RX ADMIN — ISOSORBIDE DINITRATE 10 MILLIGRAM(S): 5 TABLET ORAL at 14:54

## 2023-05-15 RX ADMIN — ISOSORBIDE DINITRATE 10 MILLIGRAM(S): 5 TABLET ORAL at 08:34

## 2023-05-15 RX ADMIN — SPIRONOLACTONE 25 MILLIGRAM(S): 25 TABLET, FILM COATED ORAL at 14:53

## 2023-05-15 RX ADMIN — Medication 25 MILLIGRAM(S): at 06:24

## 2023-05-15 RX ADMIN — Medication 40 MILLIGRAM(S): at 06:24

## 2023-05-15 RX ADMIN — ISOSORBIDE DINITRATE 10 MILLIGRAM(S): 5 TABLET ORAL at 22:17

## 2023-05-15 RX ADMIN — SACUBITRIL AND VALSARTAN 1 TABLET(S): 24; 26 TABLET, FILM COATED ORAL at 19:03

## 2023-05-15 NOTE — PROGRESS NOTE ADULT - ASSESSMENT
77-year-old male with a PMHx of HTN, DMII, former alcohol/tobacco abuse, depression and CHF who presented with acute on chronic heart failure exacerbation.          #Acute on Chronic HF Exacerbation      -further management as per cardiology       -TTE: dilated LV with severely reduced LVSF, EF 15-20% w/ global hypokinesis   -GDMT: isordil 10mg TID and hydralazine 25mg TID   -currently on IV Lasix 40mg BID    -possible ischemic workup this admission     #Acute Hypoxic Respiratory Failure    -see above for diuretic plan   -pulmonology consulted, recs appreciated      #Concern for CAP     -s/p course of CTX and Azithromycin      #Transaminitis      -likely congestive hepatopathy, improving with diuresis     -US showed enlarged steatotic liver and trace ascites     -hepatitis panel negative       #"Upset Stomach" (Dyspepsia?, Gastritis?)     -H. Pylori negative, improved with PPI      #Pre-Diabetes (A1c 6.1%)     -ISS for now, needs outpatient follow up for continued management        #Concern for MARGARITA    -CPAP at night if patient tolerated    -outpatient follow up    DVT PPx: Lovenox     Dispo: [facility]

## 2023-05-15 NOTE — PROGRESS NOTE ADULT - PROBLEM SELECTOR PLAN 1
p/w SOB, bibasilar rales requiring NC, no LE edema present  - BNP 22K--> 12K   - TTE 5/12: Dilated LV w/ severely reduced LVSF, EF 15-20% w/ global hypokinesis. Gr III LV diastolic dysfxn. Dil. RV. Reduced RVSF. Biatrial enlargement. Mod MR; Mild-to-mod TR.  PASP 39mmHg. Small pericardial effusion.  - CTA Chest 05/09: w/ B/L pleural effusion (R>L)  - CXR 5/11 AM: Small R Pleural eff minimally increased or shifting.   - POCUS 5/12: by Pulm w/ diffuse panlobar B-lines  - I/O: net neg 520ml/24H  - Diuretcis: c/w Lasix to 40mg IV BID-- trialed Metolazone x1 5/12  - GDMT: Started Isordil 10mg TID and Hydral 25mg PO TID 5/12  - PLAN: Consider ischemic w/u when euvolemic. Consider ace/arb, bb prior to dc   -Core measure, daily weight, strict I&Os and 1.2L fluid restriction    #Hypoxia  - Desats noted on Tele overnight, suspect c/b component of MARGARITA as well  - pt did not tolerate CPAP 5/10 ovn; pt did tolerate 5/11 ovn however still w/ Desats  - Satting well on 3LNC, intermittently pulls NC off and desats to upper 70s and mid 80s  - Pulmonary consulted 5/12  - PLAN: send for sleep study upon discharge, d/w CM- started process for Home O2 5/12 near euvolemic, warm and HD stable; SpO2 97% RA  -CTA chest 5/9 w/ B/L pleural effusion (R>L)  -TTE 5/12: LV dilated w/ LVEF 15-20%, G3DD, dilated RV w/ reduced RVEV, OSCAR, mod MR, mild-mod TR, PASP 39  -net neg 1L/24H, c/w Lasix 40mg IV BID and transition to PO in AM  -NPO after MN for R+LHC in AM, pt consented and will load w/ ASA 325mg and Plavix 600mg in AM prior to cath  -Continue Hydralazine 25mg TID and Isordil 10mg TID  -Start Entresto 24/26mg BID and Spironolactone 25mg QD  -Core measure, daily weight, strict I&Os and 1.2L fluid restriction near euvolemic, warm and HD stable; SpO2 97% RA  -CTA chest 5/9 w/ B/L pleural effusion (R>L)  -TTE 5/12: LV dilated w/ LVEF 15-20%, G3DD, dilated RV w/ reduced RVEV, OSCAR, mod MR, mild-mod TR, PASP 39  -net neg 1L/24H, c/w Lasix 40mg IV BID and transition to PO in AM  -NPO after MN for R+LHC in AM, pt consented and will load w/ ASA 325mg and Plavix 600mg in AM prior to cath  -Continue Hydralazine 25mg TID and Isordil 10mg TID  -Start Entresto 24/26mg BID and Spironolactone 25mg QD  -Consider starting BB after RHC  -Core measure, daily weight, strict I&Os and 1.2L fluid restriction

## 2023-05-15 NOTE — PROGRESS NOTE ADULT - PROBLEM SELECTOR PLAN 4
Possibly i/s/o ascites above, vs. gastric related as reports frequently taking Excedrin in the past  - H pylori (-)  - Abd US 05/11: Enlarged steatotic liver. Trace ascites and Rt pleural effusion  - started on probiotic, PPI and pepto-bismol PRN  - Avoid Excedrin/NSAID use presented w/ fague dull abd/epigastric pain; now improved; likely 2/2 congestive hepatopathy vs. gastric related as reports frequently taking Excedrin in the past  -H pylori (-)  -Abd  05/11: Enlarged steatotic liver. Trace ascites and Rt pleural effusion  -Continue probiotic, PPI and pepto-bismol PRN  -Avoid Excedrin/NSAID use

## 2023-05-15 NOTE — PROGRESS NOTE ADULT - ASSESSMENT
77M  former smoker, ( was a heavy smoker and quit 37 yrs ago) ambulates with walker  with PMhx HTN, DM2, right hip surgery one year ago ([facility])remote h/o EToH (quit 3-5yrs ago), depression (zoloft),  diagnosed with CHF at OSH in 2021. Now admitted with AHRF in the setting of HF exacerbation.  Pulm asked to evaluate due to concern for MARGARITA and still with persistent hypoxemia.    Patient presented with Pulm venous congestion and elevated pro BNP 22K and imaging findings suggestive of HF exacerbation now being diuresed but unclear net negative for LOS, repeat BNP 11K with reported desaturations to the 70s overnight and non-adherent with CPAP overnight. On exam today patient examined on RA with spo2 94% with good waveform, bedside ultrasound with B-lines bilaterally and trace effusion, and echo notably with moderately to severely reduced EF (EPSS appears greater than 7). Suspect still a degree of pulm edema present. With regards to noctunral hypoxemia present it is possible patient has MARGARITA vs. Central sleep apnea (seen in the setting of heart failure)- can trial nasal CPAP at night miles improve tolerance. Low suspicion or acute bacterial process (procal< 0.5, afebrile and without productive cough, although with elevated CRP)  - Diuresis per primary team  - strict Is and Os  - No need for CPAP at night currently  - ok to tolerate SPo2 92% or above    Pulmonary to sign off, please reconsult as needed 77M  former smoker, ( was a heavy smoker and quit 37 yrs ago) ambulates with walker  with PMhx HTN, DM2, right hip surgery one year ago ([facility])remote h/o EToH (quit 3-5yrs ago), depression (zoloft),  diagnosed with CHF at OSH in 2021. Now admitted with AHRF in the setting of HF exacerbation.  Pulm asked to evaluate due to concern for MARGARITA and still with persistent hypoxemia.    Patient presented with Pulm venous congestion and elevated pro BNP 22K and imaging findings suggestive of HF exacerbation now being diuresed but unclear net negative for LOS, repeat BNP 11K with reported desaturations to the 70s overnight and non-adherent with CPAP overnight. On exam today patient examined on RA with spo2 94% with good waveform, bedside ultrasound with B-lines bilaterally and trace effusion, and echo notably with moderately to severely reduced EF (EPSS appears greater than 7). Suspect still a degree of pulm edema present. With regards to noctunral hypoxemia present it is possible patient has MARGARITA vs. Central sleep apnea (seen in the setting of heart failure), this should be evaluated as an as outpatient with in lab PSG. ow suspicion or acute bacterial process (procal< 0.5, afebrile and without productive cough, although with elevated CRP)  - Diuresis per primary team  - strict Is and Os  - No need for CPAP at night currently  - ok to tolerate SPo2 92% or above    Pulmonary to sign off, please reconsult as needed

## 2023-05-15 NOTE — PROGRESS NOTE ADULT - SUBJECTIVE AND OBJECTIVE BOX
PULMONARY CONSULT SERVICE FOLLOW-UP NOTE    INTERVAL HPI:  Reviewed chart and overnight events; patient seen and examined at bedside.    MEDICATIONS:  Pulmonary:  ipratropium    for Nebulization 500 MICROGram(s) Nebulizer every 6 hours PRN    Antimicrobials:    Anticoagulants:  enoxaparin Injectable 40 milliGRAM(s) SubCutaneous every 24 hours    Cardiac:  furosemide   Injectable 40 milliGRAM(s) IV Push <User Schedule>  hydrALAZINE 25 milliGRAM(s) Oral <User Schedule>  isosorbide   dinitrate Tablet (ISORDIL) 10 milliGRAM(s) Oral <User Schedule>      Allergies    No Known Allergies    Intolerances        Vital Signs Last 24 Hrs  T(C): 36 (15 May 2023 10:10), Max: 36.8 (14 May 2023 22:14)  T(F): 96.8 (15 May 2023 10:10), Max: 98.2 (14 May 2023 22:14)  HR: 103 (15 May 2023 08:25) (83 - 103)  BP: 120/76 (15 May 2023 08:25) (105/72 - 125/80)  BP(mean): 92 (15 May 2023 08:25) (85 - 97)  RR: 20 (15 May 2023 08:25) (16 - 26)  SpO2: 93% (15 May 2023 08:25) (93% - 100%)    Parameters below as of 15 May 2023 08:25  Patient On (Oxygen Delivery Method): room air        05-14 @ 07:01  -  05-15 @ 07:00  --------------------------------------------------------  IN: 780 mL / OUT: 1700 mL / NET: -920 mL    05-15 @ 07:01  -  05-15 @ 10:28  --------------------------------------------------------  IN: 180 mL / OUT: 1725 mL / NET: -1545 mL          PHYSICAL EXAM:  Constitutional: WDWN  HEENT: NC/AT; PERRL, anicteric sclera; MMM  Neck: supple  Cardiovascular: +S1/S2, RRR  Respiratory: CTA B/L; no W/R/R  Gastrointestinal: soft, NT/ND  Extremities: WWP; no edema, clubbing or cyanosis  Vascular: 2+ radial pulses B/L  Neurological: awake and alert; PAZ    LABS:      CBC Full  -  ( 15 May 2023 05:30 )  WBC Count : 10.29 K/uL  RBC Count : 4.24 M/uL  Hemoglobin : 14.0 g/dL  Hematocrit : 41.7 %  Platelet Count - Automated : 227 K/uL  Mean Cell Volume : 98.3 fl  Mean Cell Hemoglobin : 33.0 pg  Mean Cell Hemoglobin Concentration : 33.6 gm/dL  Auto Neutrophil # : x  Auto Lymphocyte # : x  Auto Monocyte # : x  Auto Eosinophil # : x  Auto Basophil # : x  Auto Neutrophil % : x  Auto Lymphocyte % : x  Auto Monocyte % : x  Auto Eosinophil % : x  Auto Basophil % : x    05-15    135  |  95<L>  |  32<H>  ----------------------------<  133<H>  4.2   |  31  |  0.86    Ca    8.6      15 May 2023 05:30  Mg     1.9     05-15    TPro  6.1  /  Alb  3.5  /  TBili  0.8  /  DBili  x   /  AST  58<H>  /  ALT  184<H>  /  AlkPhos  86  05-15                      RADIOLOGY & ADDITIONAL STUDIES: PULMONARY CONSULT SERVICE FOLLOW-UP NOTE    INTERVAL HPI:  Reviewed chart and overnight events; patient seen and examined at bedside. Dyspnea improved.     MEDICATIONS:  Pulmonary:  ipratropium    for Nebulization 500 MICROGram(s) Nebulizer every 6 hours PRN    Antimicrobials:    Anticoagulants:  enoxaparin Injectable 40 milliGRAM(s) SubCutaneous every 24 hours    Cardiac:  furosemide   Injectable 40 milliGRAM(s) IV Push <User Schedule>  hydrALAZINE 25 milliGRAM(s) Oral <User Schedule>  isosorbide   dinitrate Tablet (ISORDIL) 10 milliGRAM(s) Oral <User Schedule>      Allergies    No Known Allergies    Intolerances        Vital Signs Last 24 Hrs  T(C): 36 (15 May 2023 10:10), Max: 36.8 (14 May 2023 22:14)  T(F): 96.8 (15 May 2023 10:10), Max: 98.2 (14 May 2023 22:14)  HR: 103 (15 May 2023 08:25) (83 - 103)  BP: 120/76 (15 May 2023 08:25) (105/72 - 125/80)  BP(mean): 92 (15 May 2023 08:25) (85 - 97)  RR: 20 (15 May 2023 08:25) (16 - 26)  SpO2: 93% (15 May 2023 08:25) (93% - 100%)    Parameters below as of 15 May 2023 08:25  Patient On (Oxygen Delivery Method): room air        05-14 @ 07:01  -  05-15 @ 07:00  --------------------------------------------------------  IN: 780 mL / OUT: 1700 mL / NET: -920 mL    05-15 @ 07:01  -  05-15 @ 10:28  --------------------------------------------------------  IN: 180 mL / OUT: 1725 mL / NET: -1545 mL          PHYSICAL EXAM:  Constitutional: NAD. Lying bed.   HEENT: MMM  Neck: +JVD  Cardiovascular: +S1/S2, RRR  Respiratory: CTA B/L; no W/R/R  Gastrointestinal: soft, NT/ND  Extremities: WWP; Edema.   Vascular: 2+ radial pulses B/L  Neurological: awake and alert; PAZ    LABS:      CBC Full  -  ( 15 May 2023 05:30 )  WBC Count : 10.29 K/uL  RBC Count : 4.24 M/uL  Hemoglobin : 14.0 g/dL  Hematocrit : 41.7 %  Platelet Count - Automated : 227 K/uL  Mean Cell Volume : 98.3 fl  Mean Cell Hemoglobin : 33.0 pg  Mean Cell Hemoglobin Concentration : 33.6 gm/dL  Auto Neutrophil # : x  Auto Lymphocyte # : x  Auto Monocyte # : x  Auto Eosinophil # : x  Auto Basophil # : x  Auto Neutrophil % : x  Auto Lymphocyte % : x  Auto Monocyte % : x  Auto Eosinophil % : x  Auto Basophil % : x    05-15    135  |  95<L>  |  32<H>  ----------------------------<  133<H>  4.2   |  31  |  0.86    Ca    8.6      15 May 2023 05:30  Mg     1.9     05-15    TPro  6.1  /  Alb  3.5  /  TBili  0.8  /  DBili  x   /  AST  58<H>  /  ALT  184<H>  /  AlkPhos  86  05-15                      RADIOLOGY & ADDITIONAL STUDIES:

## 2023-05-15 NOTE — PROGRESS NOTE ADULT - ASSESSMENT
77M, former smoker, h/o medication non compliance (follows holistic medicine only) and no medical f/u, w/ PMHx of HTN, DM-II, CHF (EF unknown, dx 2021 @ [facility]), s/p R hip surgery 2021 and Depression, presented to [facility] c/o dull abd pain/CP w/ associated SOB and orthopnea, pt admitted to cardiac tele for management of acute CHF and underlying CAP (s/p IV ABX). Hospital course also significant for transaminitis i/s/o acute CHF. Pt now pending [facility] 5/16/23.

## 2023-05-15 NOTE — PROGRESS NOTE ADULT - PROBLEM SELECTOR PLAN 2
presents w/ SOB, afebrile, WBC 14 w/ left shift and non toxic appearing  - Procal 0.36--> .27 ; CRP elevated  - ESR wnl, Lactate wnl; COVID/RVP (-), urine Legionella (-), strep pneumo (-)  - CTA Chest 05/09: w/ ground glass opacities, neg for PE  - CXR 05/11: as above  - Pulm following, apprec recs  - C/w Azithro 500mg QD x 5days and CTX 1g QD x 5days abx total (5/10-5/14) presents w/ SOB, afebrile, WBC 14 w/ left shift and non toxic appearing  -Procal 0.36--> .27, CRP elevated, ESR wnl, Lactate wnl  -COVID/RVP (-), urine Legionella (-), strep pneumo (-)  -CTA Chest 05/09: w/ ground glass opacities, neg for PE  -CXR 05/11: as above  -Pulm following, apprec recs  -s/p Azithro 500mg QD x 5days and CTX 1g QD x 5days abx total (5/10-5/14) presents w/ SOB, afebrile, WBC 14 w/ left shift and non toxic appearing; Pulm following  -Procal 0.36, CRP elevated, ESR wnl, Lactate wnl  -COVID/RVP (-), urine Legionella (-), strep pneumo (-)  -CTA Chest 05/09: w/ ground glass opacities, neg for PE  -s/p Azithro 500mg QD x 5days and CTX 1g QD x 5days abx total (5/10-5/14)

## 2023-05-15 NOTE — PROGRESS NOTE ADULT - PROBLEM SELECTOR PLAN 3
presents w/ dull abd/epigastric pain- not endorsing any abd pain today  - likely hepatic congestion i/s/o acute CHF  - Abd US 05/11: Enlarged steatotic liver. Trace ascites and Rt pleural effusion  - AST/ALT continue to be elevated at 262/470 ; improving 65/225 on 05/14  - Bilirubin uptrending- d/w Medicine- rec check Lipase: mildly elevated to 89   - Hep panel neg  - Avoid Tylenol and hepatotoxic agents presents w/ dull abd/epigastric pain- now improved; likely congestive hepatopathy  -AST/ALT continues to downtrend, today 58/184  -Abd US 05/11: Enlarged steatotic liver. Trace ascites and Rt pleural effusion  -Bilirubin downtrended to wnl  -Hep panel neg  -Avoid Tylenol and hepatotoxic agents

## 2023-05-15 NOTE — PROGRESS NOTE ADULT - PROBLEM SELECTOR PLAN 5
SBP 120s-30s  - Pt endorses prev on Lisinopril and HCTZ, however has not taken for many yrs  - Continue Lasix 40mg IV BID, Isordil 10mg TID (started 5/12) and Hydral 25mg PO TID (started 5/12)  - Consider restarting Lisinopril or arb, starting bb, prior to d/c i/s/o CHF SBP stable  -Continue Lasix 40mg IV BID, Entresto 24/26mg BID, Isordil 10mg TID, Hydralazine 25mg PO TID and Spironolactone 25mg QD

## 2023-05-15 NOTE — PROGRESS NOTE ADULT - SUBJECTIVE AND OBJECTIVE BOX
Subjective:  No acute events overnight.  Patient is doing well today without new complaints.  Denies HA, CP, SOB, abdominal pain, nausea, vomiting, fever, chills or diarrhea.     Objective:   Vital Signs:  T(C): 36 (15 May 2023 10:10), Max: 36.8 (14 May 2023 22:14)  T(F): 96.8 (15 May 2023 10:10), Max: 98.2 (14 May 2023 22:14)  HR: 103 (15 May 2023 08:25) (83 - 103)  BP: 120/76 (15 May 2023 08:25) (105/72 - 125/80)  BP(mean): 92 (15 May 2023 08:25) (85 - 97)  RR: 20 (15 May 2023 08:25) (16 - 26)  SpO2: 93% (15 May 2023 08:25) (93% - 100%)    Parameters below as of 15 May 2023 08:25  Patient On (Oxygen Delivery Method): room air    Physical Exam:   -Gen: NAD, resting in bed  -HEENT: EOMI, PERRL, no scleral icterus, no JVD  -CV: normal S1 and S2, no murmurs appreciated   -Lungs: breathing comfortably on RA  -Ab: soft, NT, ND, normal BS  -Ext: no LE edema  -Neuro: A&O x 3, no focal deficits     Labs:                        14.0   10.29 )-----------( 227      ( 15 May 2023 05:30 )             41.7       05-15    135  |  95<L>  |  32<H>  ----------------------------<  133<H>  4.2   |  31  |  0.86    Ca    8.6      15 May 2023 05:30  Mg     1.9     05-15    TPro  6.1  /  Alb  3.5  /  TBili  0.8  /  DBili  x   /  AST  58<H>  /  ALT  184<H>  /  AlkPhos  86  05-15    Medications:  MEDICATIONS  (STANDING):  enoxaparin Injectable 40 milliGRAM(s) SubCutaneous every 24 hours  furosemide   Injectable 40 milliGRAM(s) IV Push <User Schedule>  hydrALAZINE 25 milliGRAM(s) Oral <User Schedule>  insulin lispro (ADMELOG) corrective regimen sliding scale   SubCutaneous Before meals and at bedtime  isosorbide   dinitrate Tablet (ISORDIL) 10 milliGRAM(s) Oral <User Schedule>  lactobacillus acidophilus 1 Tablet(s) Oral daily  pantoprazole    Tablet 40 milliGRAM(s) Oral before breakfast    MEDICATIONS  (PRN):  aluminum hydroxide/magnesium hydroxide/simethicone Suspension 30 milliLiter(s) Oral every 4 hours PRN Dyspepsia  bismuth subsalicylate Liquid 15 milliLiter(s) Oral every 4 hours PRN Upset stomach  ipratropium    for Nebulization 500 MICROGram(s) Nebulizer every 6 hours PRN Shortness of Breath and/or Wheezing

## 2023-05-15 NOTE — PROGRESS NOTE ADULT - NSPROGADDITIONALINFOA_GEN_ALL_CORE
Attending Attestation:  I was physically present for the key portions of the evaluation and management (E/M) service provided.  I agree with the above history, physical, and plan which I have reviewed with the following edits/addendum:    77M former smoker w/ HTN, DM-II, CHF (EF unknown, dx 2021 @ [facility] no medical f/u), s/p R hip surgery 2021 and depression p/w several months of worsening CARTAGENA and chest pressure admitted for ADHF. TTE 5/12 LVEF 15% w global hypokinesis.     - plan for ischemia w/u 5/16. now close to euvolemia w normal renal fxn   - GDMT: Entresto low dose, rosa 25, check SGLT2i coverage, to start low dose BB after cath tmw    Leon Vallejo MD  Cardiology    35 minutes spent on total encounter; more than 50% of the visit was spent counseling and/or coordinating care by the attending physician. Attending Attestation:  I was physically present for the key portions of the evaluation and management (E/M) service provided.  I agree with the above history, physical, and plan which I have reviewed with the following edits/addendum:    77M former smoker w/ HTN, DM-II, CHF (EF unknown, dx 2021 @ [facility] no medical f/u), s/p R hip surgery 2021 and depression p/w several months of worsening CARTAGENA and chest pressure admitted for ADHF iso CAP (s/p antibx tx this admit). TTE 5/12 LVEF 15% w global hypokinesis.     - plan for ischemia w/u 5/16. now close to euvolemia w normal renal fxn   - GDMT: Entresto low dose, rosa 25, check SGLT2i coverage, to start low dose BB after cath tmw    Leon Vallejo MD  Cardiology    35 minutes spent on total encounter; more than 50% of the visit was spent counseling and/or coordinating care by the attending physician.

## 2023-05-15 NOTE — PROGRESS NOTE ADULT - PROBLEM SELECTOR PLAN 6
A1c 6.1, prev on Metformin   - Continue mISS    F: None  E: Replete if K<4 or Mag<2  N: DASH Diet, 1.2L fluid restriction  VTEppx: Lovenox  Dispo: pending clinical progression  F/u CM for Home O2- process started 5/12    Above discussed with Dr. Casper A1c 6.1, prev on Metformin   -Continue mISS    F: None  E: Replete if K<4 or Mag<2  N: DASH Diet, 1.2L fluid restriction  VTEppx: Lovenox  Dispo: cardiac tele  PT: [facility]

## 2023-05-15 NOTE — PROGRESS NOTE ADULT - SUBJECTIVE AND OBJECTIVE BOX
Cardiology PA Adult Progress Note    SUBJECTIVE ASSESSMENT:  	  MEDICATIONS:  furosemide   Injectable 40 milliGRAM(s) IV Push <User Schedule>  hydrALAZINE 25 milliGRAM(s) Oral <User Schedule>  isosorbide   dinitrate Tablet (ISORDIL) 10 milliGRAM(s) Oral <User Schedule>  ipratropium    for Nebulization 500 MICROGram(s) Nebulizer every 6 hours PRN  aluminum hydroxide/magnesium hydroxide/simethicone Suspension 30 milliLiter(s) Oral every 4 hours PRN  bismuth subsalicylate Liquid 15 milliLiter(s) Oral every 4 hours PRN  pantoprazole    Tablet 40 milliGRAM(s) Oral before breakfast  insulin lispro (ADMELOG) corrective regimen sliding scale   SubCutaneous Before meals and at bedtime  enoxaparin Injectable 40 milliGRAM(s) SubCutaneous every 24 hours  	  VITAL SIGNS:  T(C): 36.7 (05-15-23 @ 04:41), Max: 36.8 (05-14-23 @ 22:14)  HR: 101 (05-15-23 @ 06:31) (83 - 102)  BP: 119/79 (05-15-23 @ 06:23) (105/72 - 125/80)  RR: 20 (05-15-23 @ 06:31) (16 - 26)  SpO2: 96% (05-15-23 @ 06:31) (95% - 100%)    I&O's Summary  14 May 2023 07:01  -  15 May 2023 07:00  --------------------------------------------------------  IN: 780 mL / OUT: 1700 mL / NET: -920 mL                                     PHYSICAL EXAM:  Appearance: Normal	  HEENT: Normal oral mucosa, PERRL, EOMI	  Neck: Supple, + JVD/ - JVD; ___ Carotid Bruit   Cardiovascular: Normal S1 S2, No murmurs  Respiratory: Lungs clear to auscultation/Decreased Breath Sounds/No Rales, Rhonchi, Wheezing	  Gastrointestinal:  Soft, Non-tender, + BS	  Skin: No rashes, No ecchymoses, No cyanosis  Extremities: Normal range of motion, No clubbing, cyanosis or edema  Vascular: Peripheral pulses palpable 2+ bilaterally  Neurologic: Non-focal  Psychiatry: A & O x 3, Mood & affect appropriate    LABS:	 	                      14.0   10.29 )-----------( 227      ( 15 May 2023 05:30 )             41.7     05-15    135  |  95<L>  |  32<H>  ----------------------------<  133<H>  4.2   |  31  |  0.86    Ca    8.6      15 May 2023 05:30  Mg     1.9     05-15    TPro  6.1  /  Alb  3.5  /  TBili  0.8  /  DBili  x   /  AST  58<H>  /  ALT  184<H>  /  AlkPhos  86  05-15     Cardiology PA Adult Progress Note    SUBJECTIVE ASSESSMENT: Pt seen and examined at bedside this AM laying comfortably in bed w/o any complaints or events overnight. He states that his SOB has significantly improved and abd discomfort is mild. He denies any orthopnea, PND, lightheadedness, dizziness, CP, palpitations, or N/V.  	  MEDICATIONS:  furosemide   Injectable 40 milliGRAM(s) IV Push <User Schedule>  hydrALAZINE 25 milliGRAM(s) Oral <User Schedule>  isosorbide   dinitrate Tablet (ISORDIL) 10 milliGRAM(s) Oral <User Schedule>  ipratropium    for Nebulization 500 MICROGram(s) Nebulizer every 6 hours PRN  aluminum hydroxide/magnesium hydroxide/simethicone Suspension 30 milliLiter(s) Oral every 4 hours PRN  bismuth subsalicylate Liquid 15 milliLiter(s) Oral every 4 hours PRN  pantoprazole    Tablet 40 milliGRAM(s) Oral before breakfast  insulin lispro (ADMELOG) corrective regimen sliding scale   SubCutaneous Before meals and at bedtime  enoxaparin Injectable 40 milliGRAM(s) SubCutaneous every 24 hours  	  VITAL SIGNS:  T(C): 36.7 (05-15-23 @ 04:41), Max: 36.8 (05-14-23 @ 22:14)  HR: 101 (05-15-23 @ 06:31) (83 - 102)  BP: 119/79 (05-15-23 @ 06:23) (105/72 - 125/80)  RR: 20 (05-15-23 @ 06:31) (16 - 26)  SpO2: 96% (05-15-23 @ 06:31) (95% - 100%)    I&O's Summary  14 May 2023 07:01  -  15 May 2023 07:00  --------------------------------------------------------  IN: 780 mL / OUT: 1700 mL / NET: -920 mL                                     PHYSICAL EXAM:  Appearance: Normal	  HEENT: Normal oral mucosa, PERRL, EOMI	  Neck: Supple, - JVD; no Carotid Bruit   Cardiovascular: Normal S1 S2, No murmurs  Respiratory: Decreased Breath Sounds, No Rales, Rhonchi, Wheezing	  Gastrointestinal:  Soft, Non-tender, + BS	  Skin: No rashes, No ecchymoses, No cyanosis  Extremities: 1+ pitting LE edema B/L.   Vascular: Peripheral pulses palpable 2+ bilaterally  Neurologic: Non-focal  Psychiatry: A & O x 3, Mood & affect appropriate    LABS:	 	                      14.0   10.29 )-----------( 227      ( 15 May 2023 05:30 )             41.7     05-15    135  |  95<L>  |  32<H>  ----------------------------<  133<H>  4.2   |  31  |  0.86    Ca    8.6      15 May 2023 05:30  Mg     1.9     05-15    TPro  6.1  /  Alb  3.5  /  TBili  0.8  /  DBili  x   /  AST  58<H>  /  ALT  184<H>  /  AlkPhos  86  05-15

## 2023-05-16 LAB
ALBUMIN SERPL ELPH-MCNC: 3.1 G/DL — LOW (ref 3.3–5)
ALP SERPL-CCNC: 80 U/L — SIGNIFICANT CHANGE UP (ref 40–120)
ALT FLD-CCNC: 148 U/L — HIGH (ref 10–45)
ANION GAP SERPL CALC-SCNC: 6 MMOL/L — SIGNIFICANT CHANGE UP (ref 5–17)
APTT BLD: 30.1 SEC — SIGNIFICANT CHANGE UP (ref 27.5–35.5)
AST SERPL-CCNC: 49 U/L — HIGH (ref 10–40)
BILIRUB SERPL-MCNC: 0.8 MG/DL — SIGNIFICANT CHANGE UP (ref 0.2–1.2)
BUN SERPL-MCNC: 32 MG/DL — HIGH (ref 7–23)
CALCIUM SERPL-MCNC: 9 MG/DL — SIGNIFICANT CHANGE UP (ref 8.4–10.5)
CHLORIDE SERPL-SCNC: 94 MMOL/L — LOW (ref 96–108)
CO2 SERPL-SCNC: 35 MMOL/L — HIGH (ref 22–31)
COHGB MFR BLDA: 1.8 % — SIGNIFICANT CHANGE UP
COHGB MFR BLDV: 1.9 % — SIGNIFICANT CHANGE UP
CREAT SERPL-MCNC: 0.96 MG/DL — SIGNIFICANT CHANGE UP (ref 0.5–1.3)
EGFR: 81 ML/MIN/1.73M2 — SIGNIFICANT CHANGE UP
GLUCOSE BLDC GLUCOMTR-MCNC: 111 MG/DL — HIGH (ref 70–99)
GLUCOSE BLDC GLUCOMTR-MCNC: 141 MG/DL — HIGH (ref 70–99)
GLUCOSE BLDC GLUCOMTR-MCNC: 191 MG/DL — HIGH (ref 70–99)
GLUCOSE BLDC GLUCOMTR-MCNC: 259 MG/DL — HIGH (ref 70–99)
GLUCOSE SERPL-MCNC: 126 MG/DL — HIGH (ref 70–99)
HCT VFR BLD CALC: 46.2 % — SIGNIFICANT CHANGE UP (ref 39–50)
HCT VFR BLDA CALC: 45 % — SIGNIFICANT CHANGE UP
HGB BLD CALC-MCNC: 15 G/DL — SIGNIFICANT CHANGE UP (ref 12.6–17.4)
HGB BLD-MCNC: 14.9 G/DL — SIGNIFICANT CHANGE UP (ref 13–17)
HGB BLDA-MCNC: 14.9 G/DL — SIGNIFICANT CHANGE UP (ref 12.6–17.4)
INR BLD: 1.17 — HIGH (ref 0.88–1.16)
MAGNESIUM SERPL-MCNC: 1.8 MG/DL — SIGNIFICANT CHANGE UP (ref 1.6–2.6)
MCHC RBC-ENTMCNC: 32.3 GM/DL — SIGNIFICANT CHANGE UP (ref 32–36)
MCHC RBC-ENTMCNC: 32.6 PG — SIGNIFICANT CHANGE UP (ref 27–34)
MCV RBC AUTO: 101.1 FL — HIGH (ref 80–100)
METHGB MFR BLDA: 0.9 % — SIGNIFICANT CHANGE UP
METHGB MFR BLDV: 0.4 % — SIGNIFICANT CHANGE UP
NRBC # BLD: 0 /100 WBCS — SIGNIFICANT CHANGE UP (ref 0–0)
OXYHGB MFR BLDA: 87.8 % — LOW (ref 90–95)
PLATELET # BLD AUTO: 225 K/UL — SIGNIFICANT CHANGE UP (ref 150–400)
POTASSIUM SERPL-MCNC: 4.3 MMOL/L — SIGNIFICANT CHANGE UP (ref 3.5–5.3)
POTASSIUM SERPL-SCNC: 4.3 MMOL/L — SIGNIFICANT CHANGE UP (ref 3.5–5.3)
PROT SERPL-MCNC: 6.1 G/DL — SIGNIFICANT CHANGE UP (ref 6–8.3)
PROTHROM AB SERPL-ACNC: 14 SEC — HIGH (ref 10.5–13.4)
RBC # BLD: 4.57 M/UL — SIGNIFICANT CHANGE UP (ref 4.2–5.8)
RBC # FLD: 14.8 % — HIGH (ref 10.3–14.5)
SAO2 % BLDA: 90.2 % — LOW (ref 94–98)
SAO2 % BLDV: 66 % — LOW (ref 67–88)
SODIUM SERPL-SCNC: 135 MMOL/L — SIGNIFICANT CHANGE UP (ref 135–145)
WBC # BLD: 8.98 K/UL — SIGNIFICANT CHANGE UP (ref 3.8–10.5)
WBC # FLD AUTO: 8.98 K/UL — SIGNIFICANT CHANGE UP (ref 3.8–10.5)

## 2023-05-16 PROCEDURE — 99222 1ST HOSP IP/OBS MODERATE 55: CPT

## 2023-05-16 PROCEDURE — 99232 SBSQ HOSP IP/OBS MODERATE 35: CPT

## 2023-05-16 PROCEDURE — 99497 ADVNCD CARE PLAN 30 MIN: CPT | Mod: 25

## 2023-05-16 PROCEDURE — 93460 R&L HRT ART/VENTRICLE ANGIO: CPT | Mod: 26

## 2023-05-16 PROCEDURE — 99233 SBSQ HOSP IP/OBS HIGH 50: CPT

## 2023-05-16 PROCEDURE — 99152 MOD SED SAME PHYS/QHP 5/>YRS: CPT

## 2023-05-16 RX ORDER — SACUBITRIL AND VALSARTAN 24; 26 MG/1; MG/1
1 TABLET, FILM COATED ORAL
Refills: 0 | Status: DISCONTINUED | OUTPATIENT
Start: 2023-05-16 | End: 2023-05-17

## 2023-05-16 RX ORDER — METOPROLOL TARTRATE 50 MG
25 TABLET ORAL DAILY
Refills: 0 | Status: DISCONTINUED | OUTPATIENT
Start: 2023-05-16 | End: 2023-05-17

## 2023-05-16 RX ORDER — SACUBITRIL AND VALSARTAN 24; 26 MG/1; MG/1
1 TABLET, FILM COATED ORAL
Qty: 60 | Refills: 0
Start: 2023-05-16 | End: 2023-06-14

## 2023-05-16 RX ORDER — DAPAGLIFLOZIN 10 MG/1
10 TABLET, FILM COATED ORAL EVERY 24 HOURS
Refills: 0 | Status: DISCONTINUED | OUTPATIENT
Start: 2023-05-17 | End: 2023-05-17

## 2023-05-16 RX ADMIN — SACUBITRIL AND VALSARTAN 1 TABLET(S): 24; 26 TABLET, FILM COATED ORAL at 17:05

## 2023-05-16 RX ADMIN — Medication 325 MILLIGRAM(S): at 05:48

## 2023-05-16 RX ADMIN — Medication 1: at 21:39

## 2023-05-16 RX ADMIN — SACUBITRIL AND VALSARTAN 1 TABLET(S): 24; 26 TABLET, FILM COATED ORAL at 05:49

## 2023-05-16 RX ADMIN — Medication 15 MILLILITER(S): at 23:24

## 2023-05-16 RX ADMIN — Medication 15 MILLILITER(S): at 04:10

## 2023-05-16 RX ADMIN — SPIRONOLACTONE 25 MILLIGRAM(S): 25 TABLET, FILM COATED ORAL at 05:49

## 2023-05-16 RX ADMIN — Medication 3: at 17:04

## 2023-05-16 RX ADMIN — Medication 25 MILLIGRAM(S): at 17:06

## 2023-05-16 RX ADMIN — Medication 1 TABLET(S): at 17:05

## 2023-05-16 RX ADMIN — CLOPIDOGREL BISULFATE 600 MILLIGRAM(S): 75 TABLET, FILM COATED ORAL at 05:48

## 2023-05-16 RX ADMIN — Medication 25 MILLIGRAM(S): at 06:49

## 2023-05-16 RX ADMIN — PANTOPRAZOLE SODIUM 40 MILLIGRAM(S): 20 TABLET, DELAYED RELEASE ORAL at 05:49

## 2023-05-16 RX ADMIN — ISOSORBIDE DINITRATE 10 MILLIGRAM(S): 5 TABLET ORAL at 09:03

## 2023-05-16 NOTE — PROGRESS NOTE ADULT - ASSESSMENT
77M, former smoker, h/o medication non compliance (follows holistic medicine only) and no medical f/u, w/ PMHx of HTN, DM-II, CHF (EF unknown, dx 2021 @ [facility]), s/p R hip surgery 2021 and Depression, presented to [facility] c/o dull abd pain/CP w/ associated SOB and orthopnea, pt admitted to cardiac tele for management of acute CHF and underlying CAP (s/p IV ABX). Hospital course also significant for transaminitis i/s/o acute CHF. Pt now pending [facility] 5/16/23 and pending [facility] placement. 77M, former smoker, h/o medication non compliance (follows holistic medicine only) and no medical f/u, w/ PMHx of HTN, DM-II, CHF (EF unknown, dx 2021 @ [facility]), s/p R hip surgery 2021 and Depression, presented to [facility] c/o dull abd pain/CP w/ associated SOB and orthopnea, pt admitted to cardiac tele for management of acute CHF and underlying CAP (s/p IV ABX). Hospital course also significant for transaminitis i/s/o acute CHF. Pt now pending [facility] 5/16 and pending [facility] placement. 77M, former smoker, h/o medication non compliance (follows holistic medicine only) and no medical f/u, w/ PMHx of HTN, DM-II, CHF (EF unknown, dx 2021 @ [facility]), s/p R hip surgery 2021 and Depression, presented to [facility] c/o dull abd pain/CP w/ associated SOB and orthopnea, pt admitted to cardiac tele for management of acute CHF and underlying CAP (s/p IV ABX). Hospital course also significant for transaminitis i/s/o acute CHF. Pt s/p [facility]16 revealing normal coronaries and filling pressures, Adv HF consulted. Pt pending [facility] placement.

## 2023-05-16 NOTE — PROGRESS NOTE ADULT - NSPROGADDITIONALINFOA_GEN_ALL_CORE
Attending Attestation:  I was physically present for the key portions of the evaluation and management (E/M) service provided.  I agree with the above history, physical, and plan which I have reviewed with the following edits/addendum:    77M former smoker, former ETOH user (>3 yrs ago), HTN, DM-II, CHF (EF unknown, dx 2021 @ [facility] no medical f/u) who presents w days of SSCP and CARTAGENA admitted for ADHF iso CAP (s/p antibx tx). TTE 5/12 LVEF 15% w global hypokinesis. L/RHC 5/16: MLI throughout, LVEDP 4. RA 2 PCWP 7, RV 20/1/14, PA Sat 66%,  PA 29/14/19 CI 2.5    NICM - now euvolemic. Optimize GDMT: add metop succ 25, start SGLT2i  - continue Entresto mid dose, rosa 25  - repeat TTE in 3 months. ?sepsis related LV dysfxn vs etoh vs burnt out HTN    Leon Vallejo MD  Cardiology    35 minutes spent on total encounter; more than 50% of the visit was spent counseling and/or coordinating care by the attending physician.

## 2023-05-16 NOTE — PROGRESS NOTE ADULT - ASSESSMENT
77-year-old male with a PMHx of HTN, DMII, former alcohol/tobacco abuse, depression and CHF who presented with acute on chronic heart failure exacerbation.           #Acute on Chronic HF Exacerbation       -further management as per cardiology        -TTE: dilated LV with severely reduced LVSF, EF 15-20% w/ global hypokinesis    -GDMT and diuresis as per cardiology   -plan for LHC/RHC today     #Concern for CAP      -s/p course of CTX and Azithromycin       #Transaminitis       -likely congestive hepatopathy, improving with diuresis      -US showed enlarged steatotic liver and trace ascites      -hepatitis panel negative        #"Upset Stomach" (Gastritis?)      -H. Pylori negative, improved with PPI       #Pre-Diabetes (A1c 6.1%)      -ISS for now, needs outpatient follow up for continued management       DVT PPx: Lovenox     Dispo: [facility]

## 2023-05-16 NOTE — PROGRESS NOTE ADULT - PROBLEM SELECTOR PLAN 4
presented w/ vague dull abd/epigastric pain; now improved; likely 2/2 congestive hepatopathy vs. gastric related as reports frequently taking Excedrin in the past  -H pylori (-)  -Abd  05/11: Enlarged steatotic liver. Trace ascites and Rt pleural effusion  -Continue probiotic, PPI and pepto-bismol PRN  -Avoid Excedrin/NSAID use

## 2023-05-16 NOTE — PROGRESS NOTE ADULT - PROBLEM SELECTOR PLAN 2
presents w/ SOB, afebrile, WBC 14 w/ left shift and non toxic appearing; Pulm following  -Procal 0.36, CRP elevated, ESR wnl, Lactate wnl  -COVID/RVP (-), urine Legionella (-), strep pneumo (-)  -CTA Chest 05/09: w/ ground glass opacities, neg for PE  -s/p Azithro 500mg QD x 5days and CTX 1g QD x 5days abx total (5/10-5/14)

## 2023-05-16 NOTE — PROGRESS NOTE ADULT - PROBLEM SELECTOR PLAN 3
presents w/ dull abd/epigastric pain- now improved; likely congestive hepatopathy  -AST/ALT continues to downtrend, today 49/148  -Abd US 05/11: Enlarged steatotic liver. Trace ascites and Rt pleural effusion  -Hep panel neg  -Avoid Tylenol and hepatotoxic agents

## 2023-05-16 NOTE — PROGRESS NOTE ADULT - PROBLEM SELECTOR PLAN 6
A1c 6.1, prev on Metformin   -Continue mISS    F: None  E: Replete if K<4 or Mag<2  N: DASH Diet, 1.2L fluid restriction  VTEppx: Lovenox  Dispo: cardiac tele  PT: [facility] - pending placement

## 2023-05-16 NOTE — PROGRESS NOTE ADULT - PROBLEM SELECTOR PROBLEM 4
Stomach discomfort
Stomach discomfort
HTN (hypertension)
Stomach discomfort

## 2023-05-16 NOTE — PROGRESS NOTE ADULT - PROBLEM SELECTOR PLAN 5
SBP stable  -Continue Entresto 49/51mg BID, and Spironolactone 25mg QD SBP stable  -Continue Entresto 49/51mg BID, Spironolactone 25mg QD and Toprol 25mg QD

## 2023-05-16 NOTE — PROGRESS NOTE ADULT - PROBLEM SELECTOR PROBLEM 1
Acute CHF (congestive heart failure)

## 2023-05-16 NOTE — PROGRESS NOTE ADULT - SUBJECTIVE AND OBJECTIVE BOX
Cardiology PA Adult Progress Note    SUBJECTIVE ASSESSMENT:  	  MEDICATIONS:  sacubitril 49 mG/valsartan 51 mG 1 Tablet(s) Oral two times a day  spironolactone 25 milliGRAM(s) Oral daily  ipratropium    for Nebulization 500 MICROGram(s) Nebulizer every 6 hours PRN  aluminum hydroxide/magnesium hydroxide/simethicone Suspension 30 milliLiter(s) Oral every 4 hours PRN  bismuth subsalicylate Liquid 15 milliLiter(s) Oral every 4 hours PRN  pantoprazole    Tablet 40 milliGRAM(s) Oral before breakfast  insulin lispro (ADMELOG) corrective regimen sliding scale   SubCutaneous Before meals and at bedtime  	  VITAL SIGNS:  T(C): 36.8 (05-16-23 @ 08:56), Max: 36.8 (05-16-23 @ 08:56)  HR: 97 (05-16-23 @ 09:01) (89 - 108)  BP: 110/67 (05-16-23 @ 09:01) (105/64 - 128/76)  RR: 17 (05-16-23 @ 09:01) (16 - 20)  SpO2: 94% (05-16-23 @ 09:01) (93% - 96%)    I&O's Summary  15 May 2023 07:01  -  16 May 2023 07:00  --------------------------------------------------------  IN: 840 mL / OUT: 3400 mL / NET: -2560 mL    16 May 2023 07:01  -  16 May 2023 10:52  --------------------------------------------------------  IN: 0 mL / OUT: 0 mL / NET: 0 mL                                     PHYSICAL EXAM:  Appearance: Normal	  HEENT: Normal oral mucosa, PERRL, EOMI	  Neck: Supple, + JVD/ - JVD; ___ Carotid Bruit   Cardiovascular: Normal S1 S2, No murmurs  Respiratory: Lungs clear to auscultation/Decreased Breath Sounds/No Rales, Rhonchi, Wheezing	  Gastrointestinal:  Soft, Non-tender, + BS	  Skin: No rashes, No ecchymoses, No cyanosis  Extremities: Normal range of motion, No clubbing, cyanosis or edema  Vascular: Peripheral pulses palpable 2+ bilaterally  Neurologic: Non-focal  Psychiatry: A & O x 3, Mood & affect appropriate    LABS:	                       14.9   8.98  )-----------( 225      ( 16 May 2023 05:30 )             46.2     05-16    135  |  94<L>  |  32<H>  ----------------------------<  126<H>  4.3   |  35<H>  |  0.96    Ca    9.0      16 May 2023 05:30  Mg     1.8     05-16    TPro  6.1  /  Alb  3.1<L>  /  TBili  0.8  /  DBili  x   /  AST  49<H>  /  ALT  148<H>  /  AlkPhos  80  05-16    PT/INR - ( 16 May 2023 05:30 )   PT: 14.0 sec;   INR: 1.17          PTT - ( 16 May 2023 05:30 )  PTT:30.1 sec Cardiology PA Adult Progress Note    SUBJECTIVE ASSESSMENT: Pt seen and examined bedside this AM sitting comfortably in bed w/o any complaints or events overnight. He states that he feels well and reports that SOB and abd pain has significantly improved. He denies any orthopnea, PND, CARTAGENA, lightheadedness, dizziness, CP, palpitations, N/V or abd pain.  	  MEDICATIONS:  sacubitril 49 mG/valsartan 51 mG 1 Tablet(s) Oral two times a day  spironolactone 25 milliGRAM(s) Oral daily  ipratropium    for Nebulization 500 MICROGram(s) Nebulizer every 6 hours PRN  aluminum hydroxide/magnesium hydroxide/simethicone Suspension 30 milliLiter(s) Oral every 4 hours PRN  bismuth subsalicylate Liquid 15 milliLiter(s) Oral every 4 hours PRN  pantoprazole    Tablet 40 milliGRAM(s) Oral before breakfast  insulin lispro (ADMELOG) corrective regimen sliding scale   SubCutaneous Before meals and at bedtime  	  VITAL SIGNS:  T(C): 36.8 (05-16-23 @ 08:56), Max: 36.8 (05-16-23 @ 08:56)  HR: 97 (05-16-23 @ 09:01) (89 - 108)  BP: 110/67 (05-16-23 @ 09:01) (105/64 - 128/76)  RR: 17 (05-16-23 @ 09:01) (16 - 20)  SpO2: 94% (05-16-23 @ 09:01) (93% - 96%)    I&O's Summary  15 May 2023 07:01  -  16 May 2023 07:00  --------------------------------------------------------  IN: 840 mL / OUT: 3400 mL / NET: -2560 mL    16 May 2023 07:01  -  16 May 2023 10:52  --------------------------------------------------------  IN: 0 mL / OUT: 0 mL / NET: 0 mL                                     PHYSICAL EXAM:  Appearance: Normal	  HEENT: Normal oral mucosa, PERRL, EOMI	  Neck: Supple, - JVD; no Carotid Bruit   Cardiovascular: Normal S1 S2, No murmurs  Respiratory: Lungs clear to auscultation, No Rales, Rhonchi, Wheezing	  Gastrointestinal:  Soft, Non-tender, + BS	  Skin: No rashes, No ecchymoses, No cyanosis  Extremities: Normal range of motion, No clubbing, cyanosis or edema  Vascular: Peripheral pulses palpable 2+ bilaterally  Neurologic: Non-focal  Psychiatry: A & O x 3, Mood & affect appropriate    LABS:	                       14.9   8.98  )-----------( 225      ( 16 May 2023 05:30 )             46.2     05-16    135  |  94<L>  |  32<H>  ----------------------------<  126<H>  4.3   |  35<H>  |  0.96    Ca    9.0      16 May 2023 05:30  Mg     1.8     05-16    TPro  6.1  /  Alb  3.1<L>  /  TBili  0.8  /  DBili  x   /  AST  49<H>  /  ALT  148<H>  /  AlkPhos  80  05-16    PT/INR - ( 16 May 2023 05:30 )   PT: 14.0 sec;   INR: 1.17          PTT - ( 16 May 2023 05:30 )  PTT:30.1 sec Cardiology PA Adult Progress Note    SUBJECTIVE ASSESSMENT: Pt seen and examined bedside this AM sitting comfortably in bed w/o any complaints or events overnight. He states that he feels well and reports that SOB and abd pain has significantly improved. He denies any orthopnea, PND, CARTAGENA, lightheadedness, dizziness, CP, palpitations, N/V or abd pain.  	  MEDICATIONS:  sacubitril 49 mG/valsartan 51 mG 1 Tablet(s) Oral two times a day  spironolactone 25 milliGRAM(s) Oral daily  ipratropium    for Nebulization 500 MICROGram(s) Nebulizer every 6 hours PRN  aluminum hydroxide/magnesium hydroxide/simethicone Suspension 30 milliLiter(s) Oral every 4 hours PRN  bismuth subsalicylate Liquid 15 milliLiter(s) Oral every 4 hours PRN  pantoprazole    Tablet 40 milliGRAM(s) Oral before breakfast  insulin lispro (ADMELOG) corrective regimen sliding scale   SubCutaneous Before meals and at bedtime  	  VITAL SIGNS:  T(C): 36.8 (05-16-23 @ 08:56), Max: 36.8 (05-16-23 @ 08:56)  HR: 97 (05-16-23 @ 09:01) (89 - 108)  BP: 110/67 (05-16-23 @ 09:01) (105/64 - 128/76)  RR: 17 (05-16-23 @ 09:01) (16 - 20)  SpO2: 94% (05-16-23 @ 09:01) (93% - 96%)    I&O's Summary  15 May 2023 07:01  -  16 May 2023 07:00  --------------------------------------------------------  IN: 840 mL / OUT: 3400 mL / NET: -2560 mL    16 May 2023 07:01  -  16 May 2023 10:52  --------------------------------------------------------  IN: 0 mL / OUT: 0 mL / NET: 0 mL                                     PHYSICAL EXAM:  Appearance: Normal	  HEENT: Normal oral mucosa, PERRL, EOMI	  Neck: Supple, - JVD; no Carotid Bruit   Cardiovascular: Normal S1 S2, No murmurs  Respiratory: Lungs clear to auscultation, No Rales, Rhonchi, Wheezing	  Gastrointestinal:  Soft, Non-tender, + BS	  Skin: No rashes, No ecchymoses, No cyanosis  Extremities: Normal range of motion, No clubbing, cyanosis or edema  Vascular: Peripheral pulses palpable 2+ bilaterally  Neurologic: Non-focal  Psychiatry: A & O x 3, Mood & affect appropriate    LABS:	                       14.9   8.98  )-----------( 225      ( 16 May 2023 05:30 )             46.2     05-16    135  |  94<L>  |  32<H>  ----------------------------<  126<H>  4.3   |  35<H>  |  0.96    Ca    9.0      16 May 2023 05:30  Mg     1.8     05-16    TPro  6.1  /  Alb  3.1<L>  /  TBili  0.8  /  DBili  x   /  AST  49<H>  /  ALT  148<H>  /  AlkPhos  80  05-16    PT/INR - ( 16 May 2023 05:30 )   PT: 14.0 sec;   INR: 1.17          PTT - ( 16 May 2023 05:30 )  PTT:30.1 sec

## 2023-05-16 NOTE — PROGRESS NOTE ADULT - PROBLEM SELECTOR PLAN 1
euvolemic, warm and HD stable; SpO2 97% RA  -CTA chest 5/9 w/ B/L pleural effusion (R>L)  -TTE 5/12: LV dilated w/ LVEF 15-20%, G3DD, dilated RV w/ reduced RVEF, OSCAR, mod MR, mild-mod TR, PASP 39  -NPO for R+LHC today, pt consented and loaded w/ ASA 325mg and Plavix 600mg in AM prior to cath  -net neg 7L and s/p Lasix 40mg IV BID. IV diuretics held today, will reassess after RHC  -Stop Hydralazine and Isordil  -Increase Entresto to 49/51mg BID  -Continue Spironolactone 25mg QD  -Start Farxiga 10mg QD  -Consider starting BB after RHC  -Core measure, daily weight, strict I&Os and 1.2L fluid restriction euvolemic, warm and HD stable; SpO2 97% RA  -CTA chest 5/9 w/ B/L pleural effusion (R>L)  -TTE 5/12: LV dilated w/ LVEF 15-20%, G3DD, dilated RV w/ reduced RVEF, OSCAR, mod MR, mild-mod TR, PASP 39  -NPO for R+LHC today, pt consented and loaded w/ ASA 325mg and Plavix 600mg in AM prior to cath  -net neg 7L and s/p Lasix 40mg IV BID. IV diuretics held today, will reassess after RHC  -Continue Spironolactone 25mg QD  -Stop Hydralazine and Isordil  -Increase Entresto to 49/51mg BID  -Start Farxiga 10mg QD  -Consider starting BB after RHC  -Core measure, daily weight, strict I&Os and 1.2L fluid restriction euvolemic, warm and HD stable; SpO2 97% RA  -CTA chest 5/9 w/ B/L pleural effusion (R>L)  -TTE 5/12: LV dilated w/ LVEF 15-20%, G3DD, dilated RV w/ reduced RVEF, OSCAR, mod MR, mild-mod TR, PASP 39  -s/p R+LHC 5/16: mild luminal irregularities throughout, EDP 4, RA 2, PCWP 9, RV 20/1/14, PA Sat 66%, PA 29/14/19 AoSat 90%, CO 5.2/21, CI 2.55  -net neg 7L and s/p Lasix 40mg IV BID. IV diuretics held today, will reassess in AM  -Continue Spironolactone 25mg QD and Farxiga 10mg QD  -Increase Entresto to 49/51mg BID  -Start Toprol 25mg QD  -Stop Hydralazine and Isordil  -Core measure, daily weight, strict I&Os and 1.2L fluid restriction euvolemic, warm and HD stable, SpO2 97% RA. Advanced HF consulted  -TTE 5/12: LV dilated w/ LVEF 15-20%, G3DD, dilated RV w/ reduced RVEF, OSCAR, mod MR, mild-mod TR, PASP 39  -s/p R+LHC 5/16: mild luminal irregularities throughout, EDP 4, RA 2, PCWP 9, RV 20/1/14, PA Sat 66%, PA 29/14/19 AoSat 90%, CO 5.2/21, CI 2.55  -net neg 7L and s/p Lasix 40mg IV BID. IV diuretics held today, will reassess in AM  -Continue Spironolactone 25mg QD and Farxiga 10mg QD  -Increase Entresto to 49/51mg BID  -Start Toprol 25mg QD  -Stop Hydralazine and Isordil  -Core measure, daily weight, strict I&Os and 1.2L fluid restriction

## 2023-05-16 NOTE — PROGRESS NOTE ADULT - SUBJECTIVE AND OBJECTIVE BOX
Subjective:  No acute events overnight.  Patient is doing well today without new complaints.  Denies HA, CP, SOB, abdominal pain, nausea, vomiting, fever, chills or diarrhea.     Objective:   Vital Signs:  T(C): 36.8 (16 May 2023 08:56), Max: 36.8 (16 May 2023 08:56)  T(F): 98.3 (16 May 2023 08:56), Max: 98.3 (16 May 2023 08:56)  HR: 97 (16 May 2023 09:01) (89 - 107)  BP: 110/67 (16 May 2023 09:01) (105/64 - 128/76)  BP(mean): 93 (15 May 2023 14:52) (93 - 93)  RR: 17 (16 May 2023 09:01) (16 - 20)  SpO2: 94% (16 May 2023 09:01) (93% - 96%)    Parameters below as of 16 May 2023 09:01  Patient On (Oxygen Delivery Method): room air    Physical Exam:   -Gen: NAD, resting in bed  -HEENT: EOMI, PERRL, no scleral icterus, no JVD  -CV: normal S1 and S2, no murmurs appreciated   -Lungs: breathing comfortably on RA  -Ab: soft, NT, ND, normal BS  -Ext: no LE edema  -Neuro: A&O x 3, no focal deficits     Labs:                        14.9   8.98  )-----------( 225      ( 16 May 2023 05:30 )             46.2       05-16    135  |  94<L>  |  32<H>  ----------------------------<  126<H>  4.3   |  35<H>  |  0.96    Ca    9.0      16 May 2023 05:30  Mg     1.8     05-16    TPro  6.1  /  Alb  3.1<L>  /  TBili  0.8  /  DBili  x   /  AST  49<H>  /  ALT  148<H>  /  AlkPhos  80  05-16     Medications:  MEDICATIONS  (STANDING):  insulin lispro (ADMELOG) corrective regimen sliding scale   SubCutaneous Before meals and at bedtime  lactobacillus acidophilus 1 Tablet(s) Oral daily  pantoprazole    Tablet 40 milliGRAM(s) Oral before breakfast  sacubitril 49 mG/valsartan 51 mG 1 Tablet(s) Oral two times a day  spironolactone 25 milliGRAM(s) Oral daily    MEDICATIONS  (PRN):  aluminum hydroxide/magnesium hydroxide/simethicone Suspension 30 milliLiter(s) Oral every 4 hours PRN Dyspepsia  bismuth subsalicylate Liquid 15 milliLiter(s) Oral every 4 hours PRN Upset stomach  ipratropium    for Nebulization 500 MICROGram(s) Nebulizer every 6 hours PRN Shortness of Breath and/or Wheezing

## 2023-05-17 ENCOUNTER — TRANSCRIPTION ENCOUNTER (OUTPATIENT)
Age: 78
End: 2023-05-17

## 2023-05-17 VITALS — TEMPERATURE: 98 F

## 2023-05-17 PROBLEM — Z00.00 ENCOUNTER FOR PREVENTIVE HEALTH EXAMINATION: Status: ACTIVE | Noted: 2023-05-17

## 2023-05-17 LAB
ALBUMIN SERPL ELPH-MCNC: 3.3 G/DL — SIGNIFICANT CHANGE UP (ref 3.3–5)
ALP SERPL-CCNC: 79 U/L — SIGNIFICANT CHANGE UP (ref 40–120)
ALT FLD-CCNC: 120 U/L — HIGH (ref 10–45)
ANION GAP SERPL CALC-SCNC: 11 MMOL/L — SIGNIFICANT CHANGE UP (ref 5–17)
AST SERPL-CCNC: 49 U/L — HIGH (ref 10–40)
BILIRUB SERPL-MCNC: 0.7 MG/DL — SIGNIFICANT CHANGE UP (ref 0.2–1.2)
BUN SERPL-MCNC: 21 MG/DL — SIGNIFICANT CHANGE UP (ref 7–23)
CALCIUM SERPL-MCNC: 8.6 MG/DL — SIGNIFICANT CHANGE UP (ref 8.4–10.5)
CHLORIDE SERPL-SCNC: 95 MMOL/L — LOW (ref 96–108)
CO2 SERPL-SCNC: 30 MMOL/L — SIGNIFICANT CHANGE UP (ref 22–31)
CREAT SERPL-MCNC: 0.84 MG/DL — SIGNIFICANT CHANGE UP (ref 0.5–1.3)
EGFR: 89 ML/MIN/1.73M2 — SIGNIFICANT CHANGE UP
GLUCOSE BLDC GLUCOMTR-MCNC: 118 MG/DL — HIGH (ref 70–99)
GLUCOSE BLDC GLUCOMTR-MCNC: 228 MG/DL — HIGH (ref 70–99)
GLUCOSE SERPL-MCNC: 182 MG/DL — HIGH (ref 70–99)
HCT VFR BLD CALC: 47.6 % — SIGNIFICANT CHANGE UP (ref 39–50)
HGB BLD-MCNC: 15.5 G/DL — SIGNIFICANT CHANGE UP (ref 13–17)
MAGNESIUM SERPL-MCNC: 2.1 MG/DL — SIGNIFICANT CHANGE UP (ref 1.6–2.6)
MCHC RBC-ENTMCNC: 32.6 GM/DL — SIGNIFICANT CHANGE UP (ref 32–36)
MCHC RBC-ENTMCNC: 32.8 PG — SIGNIFICANT CHANGE UP (ref 27–34)
MCV RBC AUTO: 100.8 FL — HIGH (ref 80–100)
NRBC # BLD: 0 /100 WBCS — SIGNIFICANT CHANGE UP (ref 0–0)
PLATELET # BLD AUTO: 211 K/UL — SIGNIFICANT CHANGE UP (ref 150–400)
POTASSIUM SERPL-MCNC: 4 MMOL/L — SIGNIFICANT CHANGE UP (ref 3.5–5.3)
POTASSIUM SERPL-SCNC: 4 MMOL/L — SIGNIFICANT CHANGE UP (ref 3.5–5.3)
PROT SERPL-MCNC: 6 G/DL — SIGNIFICANT CHANGE UP (ref 6–8.3)
RBC # BLD: 4.72 M/UL — SIGNIFICANT CHANGE UP (ref 4.2–5.8)
RBC # FLD: 14.8 % — HIGH (ref 10.3–14.5)
SODIUM SERPL-SCNC: 136 MMOL/L — SIGNIFICANT CHANGE UP (ref 135–145)
WBC # BLD: 8.3 K/UL — SIGNIFICANT CHANGE UP (ref 3.8–10.5)
WBC # FLD AUTO: 8.3 K/UL — SIGNIFICANT CHANGE UP (ref 3.8–10.5)

## 2023-05-17 PROCEDURE — 84484 ASSAY OF TROPONIN QUANT: CPT

## 2023-05-17 PROCEDURE — 82248 BILIRUBIN DIRECT: CPT

## 2023-05-17 PROCEDURE — 80053 COMPREHEN METABOLIC PANEL: CPT

## 2023-05-17 PROCEDURE — 85610 PROTHROMBIN TIME: CPT

## 2023-05-17 PROCEDURE — 86705 HEP B CORE ANTIBODY IGM: CPT

## 2023-05-17 PROCEDURE — 86704 HEP B CORE ANTIBODY TOTAL: CPT

## 2023-05-17 PROCEDURE — 99239 HOSP IP/OBS DSCHRG MGMT >30: CPT

## 2023-05-17 PROCEDURE — 85027 COMPLETE CBC AUTOMATED: CPT

## 2023-05-17 PROCEDURE — C1769: CPT

## 2023-05-17 PROCEDURE — 96376 TX/PRO/DX INJ SAME DRUG ADON: CPT

## 2023-05-17 PROCEDURE — 86140 C-REACTIVE PROTEIN: CPT

## 2023-05-17 PROCEDURE — 87338 HPYLORI STOOL AG IA: CPT

## 2023-05-17 PROCEDURE — 71275 CT ANGIOGRAPHY CHEST: CPT | Mod: MA

## 2023-05-17 PROCEDURE — 99285 EMERGENCY DEPT VISIT HI MDM: CPT | Mod: 25

## 2023-05-17 PROCEDURE — 86713 LEGIONELLA ANTIBODY: CPT

## 2023-05-17 PROCEDURE — 84145 PROCALCITONIN (PCT): CPT

## 2023-05-17 PROCEDURE — 87340 HEPATITIS B SURFACE AG IA: CPT

## 2023-05-17 PROCEDURE — 82746 ASSAY OF FOLIC ACID SERUM: CPT

## 2023-05-17 PROCEDURE — 83735 ASSAY OF MAGNESIUM: CPT

## 2023-05-17 PROCEDURE — 99233 SBSQ HOSP IP/OBS HIGH 50: CPT

## 2023-05-17 PROCEDURE — 84100 ASSAY OF PHOSPHORUS: CPT

## 2023-05-17 PROCEDURE — 82962 GLUCOSE BLOOD TEST: CPT

## 2023-05-17 PROCEDURE — 84436 ASSAY OF TOTAL THYROXINE: CPT

## 2023-05-17 PROCEDURE — 86709 HEPATITIS A IGM ANTIBODY: CPT

## 2023-05-17 PROCEDURE — 85025 COMPLETE CBC W/AUTO DIFF WBC: CPT

## 2023-05-17 PROCEDURE — 99223 1ST HOSP IP/OBS HIGH 75: CPT

## 2023-05-17 PROCEDURE — 83036 HEMOGLOBIN GLYCOSYLATED A1C: CPT

## 2023-05-17 PROCEDURE — 96375 TX/PRO/DX INJ NEW DRUG ADDON: CPT

## 2023-05-17 PROCEDURE — 82247 BILIRUBIN TOTAL: CPT

## 2023-05-17 PROCEDURE — C1894: CPT

## 2023-05-17 PROCEDURE — 82803 BLOOD GASES ANY COMBINATION: CPT

## 2023-05-17 PROCEDURE — 82550 ASSAY OF CK (CPK): CPT

## 2023-05-17 PROCEDURE — 84443 ASSAY THYROID STIM HORMONE: CPT

## 2023-05-17 PROCEDURE — 97161 PT EVAL LOW COMPLEX 20 MIN: CPT

## 2023-05-17 PROCEDURE — 36415 COLL VENOUS BLD VENIPUNCTURE: CPT

## 2023-05-17 PROCEDURE — 86706 HEP B SURFACE ANTIBODY: CPT

## 2023-05-17 PROCEDURE — C8929: CPT

## 2023-05-17 PROCEDURE — 83605 ASSAY OF LACTIC ACID: CPT

## 2023-05-17 PROCEDURE — 85730 THROMBOPLASTIN TIME PARTIAL: CPT

## 2023-05-17 PROCEDURE — 80061 LIPID PANEL: CPT

## 2023-05-17 PROCEDURE — 82553 CREATINE MB FRACTION: CPT

## 2023-05-17 PROCEDURE — 82607 VITAMIN B-12: CPT

## 2023-05-17 PROCEDURE — 83880 ASSAY OF NATRIURETIC PEPTIDE: CPT

## 2023-05-17 PROCEDURE — 0225U NFCT DS DNA&RNA 21 SARSCOV2: CPT

## 2023-05-17 PROCEDURE — 83690 ASSAY OF LIPASE: CPT

## 2023-05-17 PROCEDURE — 97116 GAIT TRAINING THERAPY: CPT

## 2023-05-17 PROCEDURE — C1887: CPT

## 2023-05-17 PROCEDURE — 80076 HEPATIC FUNCTION PANEL: CPT

## 2023-05-17 PROCEDURE — 84480 ASSAY TRIIODOTHYRONINE (T3): CPT

## 2023-05-17 PROCEDURE — 80048 BASIC METABOLIC PNL TOTAL CA: CPT

## 2023-05-17 PROCEDURE — 87899 AGENT NOS ASSAY W/OPTIC: CPT

## 2023-05-17 PROCEDURE — 85652 RBC SED RATE AUTOMATED: CPT

## 2023-05-17 PROCEDURE — 71045 X-RAY EXAM CHEST 1 VIEW: CPT

## 2023-05-17 PROCEDURE — 96372 THER/PROPH/DIAG INJ SC/IM: CPT

## 2023-05-17 PROCEDURE — 76705 ECHO EXAM OF ABDOMEN: CPT

## 2023-05-17 PROCEDURE — 86803 HEPATITIS C AB TEST: CPT

## 2023-05-17 PROCEDURE — 94660 CPAP INITIATION&MGMT: CPT

## 2023-05-17 PROCEDURE — 96374 THER/PROPH/DIAG INJ IV PUSH: CPT

## 2023-05-17 RX ORDER — METOPROLOL TARTRATE 50 MG
1 TABLET ORAL
Qty: 0 | Refills: 0 | DISCHARGE
Start: 2023-05-17

## 2023-05-17 RX ORDER — PANTOPRAZOLE SODIUM 20 MG/1
1 TABLET, DELAYED RELEASE ORAL
Qty: 0 | Refills: 0 | DISCHARGE
Start: 2023-05-17

## 2023-05-17 RX ORDER — IPRATROPIUM BROMIDE 0.2 MG/ML
2.5 SOLUTION, NON-ORAL INHALATION
Qty: 0 | Refills: 0 | DISCHARGE
Start: 2023-05-17

## 2023-05-17 RX ORDER — SPIRONOLACTONE 25 MG/1
1 TABLET, FILM COATED ORAL
Qty: 0 | Refills: 0 | DISCHARGE
Start: 2023-05-17

## 2023-05-17 RX ORDER — LOSARTAN POTASSIUM 100 MG/1
100 TABLET, FILM COATED ORAL DAILY
Refills: 0 | Status: DISCONTINUED | OUTPATIENT
Start: 2023-05-17 | End: 2023-05-17

## 2023-05-17 RX ORDER — LOSARTAN POTASSIUM 100 MG/1
1 TABLET, FILM COATED ORAL
Qty: 0 | Refills: 0 | DISCHARGE
Start: 2023-05-17

## 2023-05-17 RX ORDER — FUROSEMIDE 40 MG
1 TABLET ORAL
Qty: 30 | Refills: 0
Start: 2023-05-17

## 2023-05-17 RX ADMIN — Medication 2: at 07:34

## 2023-05-17 RX ADMIN — Medication 1 TABLET(S): at 11:38

## 2023-05-17 RX ADMIN — DAPAGLIFLOZIN 10 MILLIGRAM(S): 10 TABLET, FILM COATED ORAL at 07:32

## 2023-05-17 RX ADMIN — SPIRONOLACTONE 25 MILLIGRAM(S): 25 TABLET, FILM COATED ORAL at 05:33

## 2023-05-17 RX ADMIN — PANTOPRAZOLE SODIUM 40 MILLIGRAM(S): 20 TABLET, DELAYED RELEASE ORAL at 05:34

## 2023-05-17 RX ADMIN — Medication 25 MILLIGRAM(S): at 05:34

## 2023-05-17 RX ADMIN — SACUBITRIL AND VALSARTAN 1 TABLET(S): 24; 26 TABLET, FILM COATED ORAL at 05:34

## 2023-05-17 RX ADMIN — Medication 30 MILLILITER(S): at 00:30

## 2023-05-17 NOTE — PROGRESS NOTE ADULT - SUBJECTIVE AND OBJECTIVE BOX
Subjective:  No acute events overnight.  Patient is doing well today without new complaints.  Denies HA, CP, SOB, abdominal pain, nausea, vomiting, fever, chills or diarrhea.     Objective:   Vital Signs:  T(C): 36.1 (17 May 2023 09:41), Max: 36.9 (17 May 2023 06:27)  T(F): 97 (17 May 2023 09:41), Max: 98.4 (17 May 2023 06:27)  HR: 105 (17 May 2023 10:05) (79 - 112)  BP: 128/81 (17 May 2023 08:26) (108/66 - 130/72)  BP(mean): 96 (17 May 2023 05:27) (95 - 104)  RR: 17 (17 May 2023 10:05) (16 - 18)  SpO2: 96% (17 May 2023 10:05) (92% - 98%)    Parameters below as of 17 May 2023 10:05  Patient On (Oxygen Delivery Method): room air    Physical Exam:   -Gen: NAD, resting in bed  -HEENT: EOMI, PERRL, no scleral icterus, no JVD  -CV: normal S1 and S2, no murmurs appreciated   -Lungs: breathing comfortably on RA  -Ab: soft, NT, ND, normal BS  -Ext: no LE edema  -Neuro: A&O x 3, no focal deficits     Labs:                        15.5   8.30  )-----------( 211      ( 17 May 2023 05:30 )             47.6       05-17    136  |  95<L>  |  21  ----------------------------<  182<H>  4.0   |  30  |  0.84    Ca    8.6      17 May 2023 05:30  Mg     2.1     05-17    TPro  6.0  /  Alb  3.3  /  TBili  0.7  /  DBili  x   /  AST  49<H>  /  ALT  120<H>  /  AlkPhos  79  05-17    Medications:  MEDICATIONS  (STANDING):  dapagliflozin 10 milliGRAM(s) Oral every 24 hours  insulin lispro (ADMELOG) corrective regimen sliding scale   SubCutaneous Before meals and at bedtime  lactobacillus acidophilus 1 Tablet(s) Oral daily  metoprolol succinate ER 25 milliGRAM(s) Oral daily  pantoprazole    Tablet 40 milliGRAM(s) Oral before breakfast  sacubitril 49 mG/valsartan 51 mG 1 Tablet(s) Oral two times a day  spironolactone 25 milliGRAM(s) Oral daily    MEDICATIONS  (PRN):  aluminum hydroxide/magnesium hydroxide/simethicone Suspension 30 milliLiter(s) Oral every 4 hours PRN Dyspepsia  bismuth subsalicylate Liquid 15 milliLiter(s) Oral every 4 hours PRN Upset stomach  ipratropium    for Nebulization 500 MICROGram(s) Nebulizer every 6 hours PRN Shortness of Breath and/or Wheezing

## 2023-05-17 NOTE — CONSULT NOTE ADULT - CONVERSATION DETAILS
Met with pt at bedside. Alert and engages in discussion, but noted to have decreased attention. some confusion, and difficulty following conversation thus limiting ACP, GOC discussion. Pt understands he has heart failure, but with poor insight into trajectory. He lives with a room mate, is without family, and does express worry about being able to care for himself.     Pt demonstrates capacity to make health care decisions of low complexity ie name HCP. Pt has not completed HCP. Explained role and circumstances when form would come into effect. Pt electing his long time best friend, Bam Vivasrdan. Pt provided this writer with Bam's address and phone number, form completed and placed in physical chart.     Support provided.

## 2023-05-17 NOTE — CONSULT NOTE ADULT - SUBJECTIVE AND OBJECTIVE BOX
HPI:  AOx3, Stable, Full Code     77M  former smoker, ( was a heavy smoker and quit 37 yrs ago) ambulates with walker  with PMhx HTN, DM2, right hip surgery one year ago ([facility])remote h/o EToH (quit 3-5yrs ago), depression (zoloft),  diagnosed with CHF at OSH in 2021 and lost to follow up, non compliant with medications presents [facility] 5/10 c/o several days of dull, intermittent SSCP with SOB and orthopnea. No LE edema.     In speaking with patient, he reports one week of SSCP, 3/10 dull discomfort with CARTAGENA minimal exertion (ambulating one block), 2+ pillow orthopnea for one week.  Pt. states he has not seen a physician for approximately 10 yrs and does not have a PCP  or Cardiologist.  According to patient,  he was last seen by a physician at [facility] before hip surgery and was told he had HF in which he was prescribed medication.  Pt. admits to not taking medication and instead took over counter vitamins because he believes in homeopathic medicine.  However, after interviewer spoke with patient regarding the importance of medical follow up,  patient agrees to start seeing a Cardiologist and  being compliant with medication.  Pt. denies fever, chills, HA, chest pain, palpitations, dizziness, diaphoresis, abdominal discomfort and n/v/d..  Now admitted for further management of acute exacerbation of chronic chf. (10 May 2023 00:56)    Hospital course, primary team and consultant notes reviewed. Met with pt at bedside, found to be alert with mild delirium and overtly comfortable. Comprehensive ROS as noted below, collateral obtained from chart.. Exploration of GOC documented as below, limited due to pts mental status.         PAST MEDICAL & SURGICAL HISTORY:  HTN (hypertension)    DM2 (diabetes mellitus, type 2)  Heart failure  Anxiety and depression          FAMILY HISTORY:   Reviewed; no history of     in mother or father    Opiate Naive (Y/N):  Yes  iStop reviewed (Y/N): No.  No Rx found on iStop review (Ref#:           Items that are not checked are not present  PSYCHOSOCIAL ASSESSMENT:  - Significant other/partner: [  ]  Children: [  ]  - Living Situation: Home [ x ] Long term care [  ]  Rehab[  ]  Other[  ]  - Support system: strong[  ] adequate[x  ] inadequate[  ]  - Yazdanism/Spiritual practice: deferred   - Role of organized Hoahaoism important [  ] some [  ] unable to assess dt pt mentation [  ]  - Coping: well[ x ]  with difficulty[  ]  poor coping[  ]  unable to assess dt pt mentation [  ]    ADVANCE DIRECTIVES:    - MOLST[  ]    Living Will [  ]   DECISION MAKER(s):  - Health Care Proxy(s) [  ]  Surrogate(s)[  ] Guardian[  ]           Name(s)/Phone Number(s):     BASELINE (I)ADLs (prior to admission):  - [place]:  Total[ x ] Moderate[  ] Dependent[  ]    Allergies    No Known Allergies    Intolerances        Medications:      MEDICATIONS  (STANDING):    MEDICATIONS  (PRN):      PRESENT SYMPTOMS:   [ x]Unable to obtain due to poor mentation/ delirium   Source if other than patient:  [ ]Family   [ ]Team     Pain [  ]      PAIN AD Score: 0  [email or web address] (press ctrl +  left click to view)    Analgesic Use (PRNs) for past 24 hours:  - none       If [  ], pt denies symptom.   Dyspnea:         [  ] on admission, now resolved   Anxiety:           [  ]  Difficulty sleeping: [  ]  Fatigue:           [ x ]  Nausea:           [  ] on admission, now resolved   Loss of appetite:     [ x ]  Other Symptoms:    All other review of systems negative [ x ]    ECOG Performance:     3  Current Palliative Performance Scale/Karnofsky Score:  50  %  Preadmit Karnofsky: 70  %          PEx:  General: elderly man lying in bed, NAD  alert  oriented x  2 verbal  Behavioral: Delirium   HEENT: atraumatic,  No temporal wasting,  No dry mouth   RESP: Reg rhythm, No  tachypnea/labored breathing,  No audible excessive secretions,  CTAB, diminished bilat bases  CV: RRR, S1S2,  No  tachycardia  GI: soft non distended non tender  incontinent:   MUSK: weakness x4, No edema, ambulatory with assistance  SKIN: No abnormal skin lesions, Poor skin turgor, Pallor  NEURO:  No deficits, cognitive impairment, encephalopathic dsyphagia dysarthria paresis  Oral intake ability:  full capability      T(C): 36.7 (05-17-23 @ 13:35), Max: 36.7 (05-17-23 @ 13:35)  HR: 110 (05-17-23 @ 11:40) (110 - 110)  BP: 140/93 (05-17-23 @ 11:40) (140/93 - 140/93)  RR: 17 (05-17-23 @ 11:40) (17 - 17)  SpO2: 98% (05-17-23 @ 11:40) (98% - 98%)  Wt(kg): --    Labs:    CBC:                        15.5   8.30  )-----------( 211      ( 17 May 2023 05:30 )             47.6     CMP:    05-17    136  |  95<L>  |  21  ----------------------------<  182<H>  4.0   |  30  |  0.84    Ca    8.6      17 May 2023 05:30  Mg     2.1     05-17    TPro  6.0  /  Alb  3.3  /  TBili  0.7  /  DBili  x   /  AST  49<H>  /  ALT  120<H>  /  AlkPhos  79  05-17           RADIOLOGY & ADDITIONAL STUDIES:  Imaging:  Reviewed  < from: CT Angio Chest PE Protocol w/ IV Cont (05.09.23 @ 21:22) >    IMPRESSION:  1. Bilateral groundglass nodularity in the mid-lower lung zones.   Differential includes atypical pneumonia, bronchiolitis, alveolar   hemorrhage, and pulmonary edema. Correlate clinically.    2. Additional findings concerning for possible superimposed congestive   heart failure.    3. Borderline dilated main pulmonary artery, which may be seen in setting   of pulmonary hypertension.    4. Small bilateral effusions, right greater than left.    5. No pulmonary embolism.      < from: US Abdomen Limited (05.10.23 @ 19:55) >  IMPRESSION:  1.  Enlarged steatotic liver.  2.  Trace ascites and right pleural effusion, probably related to   elevated right heart pressures/cardiac decompensation.    < from: Xray Chest 1 View- PORTABLE-Urgent (Xray Chest 1 View- PORTABLE-Urgent .) (05.11.23 @ 11:28) >  IMPRESSION:  Small right pleural effusion similar to minimally increased or shifting   since prior exam 5/9/2023. No definite acute infiltrates. No   pneumothorax. Prominent size heart.    [facility] discussion:

## 2023-05-17 NOTE — DISCHARGE NOTE NURSING/CASE MANAGEMENT/SOCIAL WORK - NSDCPEFALRISK_GEN_ALL_CORE
For information on Fall & Injury Prevention, visit: [email or web address] OR  [email or web address] OR  https://www.cdc.gov/steadi/patient.html

## 2023-05-17 NOTE — PROGRESS NOTE ADULT - ASSESSMENT
77-year-old male with a PMHx of HTN, DMII, former alcohol/tobacco abuse, depression and CHF who presented with acute on chronic heart failure exacerbation.            #Acute on Chronic HF Exacerbation        -further management as per cardiology         -TTE: dilated LV with severely reduced LVSF, EF 15-20% w/ global hypokinesis     -GDMT: Entresto 49-51mg BID, metoprolol succinate 25mg daily, spironolactone 25mg daily and farxiga 10mg daily    -further diuresis as per cardiology     -ischemic workup negative, plan for repeat TTE in 3 months     #Concern for CAP       -s/p course of CTX and Azithromycin        #Transaminitis        -likely congestive hepatopathy, improved with diuresis       -US showed enlarged steatotic liver and trace ascites       -hepatitis panel negative         #"Upset Stomach" (Gastritis?)       -H. Pylori negative, improved with PPI        #Pre-Diabetes (A1c 6.1%)       -ISS for now, needs outpatient follow up for continued management      -started on Farxiga as mentioned above      DVT PPx: Lovenox     Dispo: [facility]

## 2023-05-17 NOTE — PROGRESS NOTE ADULT - PROVIDER SPECIALTY LIST ADULT
Cardiology
Hospitalist
Cardiology
Cardiology
Hospitalist
Hospitalist
Cardiology
Hospitalist
Hospitalist
Internal Medicine
Intervent Cardiology
Intervent Cardiology
Pulmonology
Hospitalist
Intervent Cardiology
Cardiology

## 2023-05-17 NOTE — CONSULT NOTE ADULT - PROBLEM SELECTOR RECOMMENDATION 2
.  inattention, difficulty following conversation, mild confusion   - Delirium precautions: bedside window with blinds open; frequent re-orientation; pictures of family if possible; minimize lines, physical restraints, nighttime awakenings as medically feasible; ensure bowel movements daily or every other day, monitor for urinary retention; avoid anticholinergic medications or benzodiazepines as clinically feasible.

## 2023-05-17 NOTE — CONSULT NOTE ADULT - PROBLEM SELECTOR RECOMMENDATION 3
.  likely 2/2 hypertension. TTE 5/12: LV dilated w/ LVEF 15-20%. admitted for acute CHF. hx of nonadherence to medication and outpatient follow up.   - HF and card recs noted   - does not meet hospice criteria   - poor insight into dx and trajectory

## 2023-05-17 NOTE — CONSULT NOTE ADULT - TIME BILLING
Emotional Support/Supportive Care and Clarification of Potential Disease Trajectory related to HF  Exploration of GOC including advanced directives such as HCP designation
case complexity as above.

## 2023-05-17 NOTE — CONSULT NOTE ADULT - PROBLEM SELECTOR RECOMMENDATION 5
.  Complex medical decision making / ACP in the setting of advanced illness.    GOC and ACP limited in setting of pts mild delirium; pt pending discharge to [facility] today.     Coping:  well[  x] with difficulty[  ] poor coping[  ] unable to assess[  ]   Support system: strong[  ] adequate[  ] inadequate[x  ]    Active Psychosocial Referrals:  SW/CM[ x ] PT/OT[ x ] Hospice[  ]  Ethics[  ]    - For new or uncontrolled symptoms, please call Palliative Care at [phone]-[facility]. The service is available 24/7 (including nights & weekends) to provide symptom management recommendations over the phone as appropriate  - Will continue to follow for ongoing GOC discussion / titration of palliative regimen. Emotional support provided, questions answered.

## 2023-05-17 NOTE — CONSULT NOTE ADULT - ASSESSMENT
77M, former smoker, h/o medication non compliance (follows holistic medicine only) and no medical f/u, w/ PMHx of HTN, DM-II, CHF (EF unknown, dx 2021 @ [facility]), s/p R hip surgery 2021 and Depression, presented to [facility] c/o dull abd pain/CP w/ associated SOB and orthopnea, pt admitted to cardiac tele for management of acute CHF and underlying CAP (s/p IV ABX). Hospital course also significant for transaminitis i/s/o acute CHF. Pt s/p R+LHC 5/16 revealing normal coronaries and filling pressures, Adv HF consulted. Pt pending BRIAN placement.     TTE 5/12: LV dilated w/ LVEF 15-20%, G3DD, dilated RV w/ reduced RVEF, OSCAR, mod MR, mild-mod TR, PASP 39   R+LHC 5/16: mild luminal irregularities throughout, EDP 4, RA 2, PCWP 9, RV 20/1/14, PA Sat 66%, PA 29/14/19 AoSat 90%, CO 5.2/21, CI 2.55       #acute sCHF  Etiology: possibly 2/2 hypertension, recommend cMRI while waiting for brian, if not then outpatient   GDMT: since patient doesn't have prescription coverage, can switch entresto 49-51 mg BID to losartan 100 mg daily. c/w Toprol 25 mg diayl and spironolactone 25. SGL2i when patient obtains prescription coverage   Diuretics: euvolemic, no diuretics   Device: GDMT then reassess     Patient should follow up with Dr. López as an outpatient

## 2023-05-17 NOTE — CONSULT NOTE ADULT - PROBLEM SELECTOR RECOMMENDATION 4
.  - pt without capacity to make complex medical decisions ie code status; continue full code   - Pt without family. Pt has capacity to make decisions of low level complexity ie name HCP; pt electing friend Bam Alvarenga     In addition to the E/M visit, an advance care planning meeting was performed. Start time: 1200; End time: 1230; Total time: 30min. A face to face meeting to discuss advance care planning was held today regarding:   Leon Lennon  Primary decision maker:  Patient is able to participate in decision making;  Alternate/surrogate: Bam Alvarenga   . Discussed advance directives including, but not limited to, healthcare proxy and code status, as well as disease trajectory, patient's values/goals, and health care options that are available for end of life care. Decision regarding code status: FULL CODE; Documentation completed today: HCP [facility] note

## 2023-05-17 NOTE — CONSULT NOTE ADULT - PROBLEM SELECTOR RECOMMENDATION 9
.  in setting of older age, HF, and lack of PO intake iso delirium. pps 50%, requires assistance with some ADLs including meal set up.  - PT evaluation noted, pending dc to [facility] today   - cw supportive care

## 2023-05-17 NOTE — DISCHARGE NOTE NURSING/CASE MANAGEMENT/SOCIAL WORK - PATIENT PORTAL LINK FT
You can access the FollowMyHealth Patient Portal offered by [facility] by registering at the following website: [email or web address]. By joining [facility]’s FollowMyHealth portal, you will also be able to view your health information using other applications (apps) compatible with our system.

## 2023-05-17 NOTE — CONSULT NOTE ADULT - ATTENDING COMMENTS
78 YO M with a history of HF diagnosed at [place] but not on medications to this point who was admitted for acute decompensated heart failure. He was hypertensive on arrival and his TTE revealed severe LV dysfunction and R/LHC revealed normal coronaries and hemodynamics. He is euvolemic and tolerating good doses of GDMT, limited by lack of insurance coverage at his this time.    REVIEW OF STUDIES  EKG: SR, RBBB with -150 ms  TTE 5/2023: LV 6.5 cm, LVEF 15-20% with global hypokinesis, LVOT VTI 5 cm, moderate functional MR, mild-moderate TR  LHC 5/2023: mild luminal irregularities, LVEDP 4 mmHg  RHC 5/2023: RA 2, PCWP 9, RV 20/1/14, PA Sat 66%, PA 29/14/19 AoSat 90%, CO 5.2/21, CI 2.55  ProBNP on admission: 19407    PLAN  # Acute systolic heart failure  -Etiology: NICM. would obtain cMRI if able to get done before LION placement  -GDMT: switch entresto 49-51 mg BID to losartan 100 mg daily and will switch back to Entresto once he has medication coverage with insurance. continue toprol XL 25, spironolactone 25. SGL2i when he has insurance coverage   -Diuretics: euvolemic off diuretics  -Device: needs reassessment of LVEF on max GDMT as outpatient    # Transaminitis  -Likely related to congestion on arrival, now improving with GDMT and afterload reduction     No contraindications to discharge from HF perspective, please arrange f/u with our team on discharge
78 yo M former smoker with h/o MARGARITA but not using CPAP at home admitted for acute exacerbation of HF. pulmonary consulted for reported hypoxia and MARGARITA. on my exam he was 98% on 2L and 94% on room air. no consolidation appreciated on ultrasound. recs as above.

## 2023-05-17 NOTE — CONSULT NOTE ADULT - ASSESSMENT
78 YO M PMHx  HTN, DM-II, CHF, s/p R hip surgery 2021, Depression, medication and outpatient follow up non adherence who presented to [facility] c/o dull abd pain/CP w/ associated SOB and orthopnea, pt admitted to cardiac tele for management of acute CHF and underlying CAP. Star patient, palliative trigger for ACP, GOC.

## 2023-05-17 NOTE — PROGRESS NOTE ADULT - SUBJECTIVE AND OBJECTIVE BOX
Cardiology PA Adult Progress Note    SUBJECTIVE ASSESSMENT:  	  MEDICATIONS:  metoprolol succinate ER 25 milliGRAM(s) Oral daily  sacubitril 49 mG/valsartan 51 mG 1 Tablet(s) Oral two times a day  spironolactone 25 milliGRAM(s) Oral daily  ipratropium    for Nebulization 500 MICROGram(s) Nebulizer every 6 hours PRN  aluminum hydroxide/magnesium hydroxide/simethicone Suspension 30 milliLiter(s) Oral every 4 hours PRN  bismuth subsalicylate Liquid 15 milliLiter(s) Oral every 4 hours PRN  pantoprazole    Tablet 40 milliGRAM(s) Oral before breakfast  dapagliflozin 10 milliGRAM(s) Oral every 24 hours  insulin lispro (ADMELOG) corrective regimen sliding scale   SubCutaneous Before meals and at bedtime  	  VITAL SIGNS:  T(C): 36.9 (05-17-23 @ 06:27), Max: 36.9 (05-17-23 @ 06:27)  HR: 103 (05-17-23 @ 06:19) (79 - 112)  BP: 130/72 (05-17-23 @ 05:27) (108/66 - 130/72)  RR: 18 (05-17-23 @ 06:19) (16 - 18)  SpO2: 97% (05-17-23 @ 06:19) (92% - 97%)    I&O's Summary  16 May 2023 07:01  -  17 May 2023 07:00  --------------------------------------------------------  IN: 537 mL / OUT: 775 mL / NET: -238 mL                                     PHYSICAL EXAM:  Appearance: Normal	  HEENT: Normal oral mucosa, PERRL, EOMI	  Neck: Supple, + JVD/ - JVD; ___ Carotid Bruit   Cardiovascular: Normal S1 S2, No murmurs  Respiratory: Lungs clear to auscultation/Decreased Breath Sounds/No Rales, Rhonchi, Wheezing	  Gastrointestinal:  Soft, Non-tender, + BS	  Skin: No rashes, No ecchymoses, No cyanosis  Extremities: Normal range of motion, No clubbing, cyanosis or edema  Vascular: Peripheral pulses palpable 2+ bilaterally  Neurologic: Non-focal  Psychiatry: A & O x 3, Mood & affect appropriate    LABS:	 	                     15.5   8.30  )-----------( 211      ( 17 May 2023 05:30 )             47.6     05-17    136  |  95<L>  |  21  ----------------------------<  182<H>  4.0   |  30  |  0.84    Ca    8.6      17 May 2023 05:30  Mg     2.1     05-17    TPro  6.0  /  Alb  3.3  /  TBili  0.7  /  DBili  x   /  AST  49<H>  /  ALT  120<H>  /  AlkPhos  79  05-17      PT/INR - ( 16 May 2023 05:30 )   PT: 14.0 sec;   INR: 1.17          PTT - ( 16 May 2023 05:30 )  PTT:30.1 sec

## 2023-05-18 DIAGNOSIS — Z71.89 OTHER SPECIFIED COUNSELING: ICD-10-CM

## 2023-05-18 DIAGNOSIS — R41.0 DISORIENTATION, UNSPECIFIED: ICD-10-CM

## 2023-05-18 DIAGNOSIS — Z51.5 ENCOUNTER FOR PALLIATIVE CARE: ICD-10-CM

## 2023-05-18 DIAGNOSIS — R53.81 OTHER MALAISE: ICD-10-CM

## 2023-05-18 PROBLEM — E11.9 TYPE 2 DIABETES MELLITUS WITHOUT COMPLICATIONS: Chronic | Status: ACTIVE | Noted: 2023-05-10

## 2023-05-18 PROBLEM — I10 ESSENTIAL (PRIMARY) HYPERTENSION: Chronic | Status: ACTIVE | Noted: 2023-05-10

## 2023-05-18 PROBLEM — F41.9 ANXIETY DISORDER, UNSPECIFIED: Chronic | Status: ACTIVE | Noted: 2023-05-10

## 2023-05-18 PROBLEM — I50.9 HEART FAILURE, UNSPECIFIED: Chronic | Status: ACTIVE | Noted: 2023-05-10

## 2023-05-24 ENCOUNTER — NON-APPOINTMENT (OUTPATIENT)
Age: 78
End: 2023-05-24

## 2023-05-24 ENCOUNTER — APPOINTMENT (OUTPATIENT)
Dept: HEART AND VASCULAR | Facility: CLINIC | Age: 78
End: 2023-05-24

## 2023-05-24 DIAGNOSIS — R74.01 ELEVATION OF LEVELS OF LIVER TRANSAMINASE LEVELS: ICD-10-CM

## 2023-05-24 DIAGNOSIS — I50.22 CHRONIC SYSTOLIC (CONGESTIVE) HEART FAILURE: ICD-10-CM

## 2023-05-24 DIAGNOSIS — I10 ESSENTIAL (PRIMARY) HYPERTENSION: ICD-10-CM

## 2023-05-24 RX ORDER — METOPROLOL SUCCINATE 25 MG/1
25 TABLET, EXTENDED RELEASE ORAL DAILY
Refills: 0 | Status: ACTIVE | COMMUNITY

## 2023-05-24 RX ORDER — LOSARTAN POTASSIUM 100 MG/1
100 TABLET, FILM COATED ORAL DAILY
Refills: 0 | Status: ACTIVE | COMMUNITY

## 2023-05-24 RX ORDER — SPIRONOLACTONE 25 MG/1
25 TABLET ORAL DAILY
Refills: 0 | Status: ACTIVE | COMMUNITY

## 2023-05-24 RX ORDER — PANTOPRAZOLE 40 MG/1
40 TABLET, DELAYED RELEASE ORAL DAILY
Refills: 0 | Status: ACTIVE | COMMUNITY

## 2023-05-24 RX ORDER — FUROSEMIDE 40 MG/1
40 TABLET ORAL DAILY
Refills: 0 | Status: ACTIVE | COMMUNITY

## 2023-05-25 DIAGNOSIS — R18.8 OTHER ASCITES: ICD-10-CM

## 2023-05-25 DIAGNOSIS — F32.A DEPRESSION, UNSPECIFIED: ICD-10-CM

## 2023-05-25 DIAGNOSIS — I36.1 NONRHEUMATIC TRICUSPID (VALVE) INSUFFICIENCY: ICD-10-CM

## 2023-05-25 DIAGNOSIS — I50.23 ACUTE ON CHRONIC SYSTOLIC (CONGESTIVE) HEART FAILURE: ICD-10-CM

## 2023-05-25 DIAGNOSIS — Z91.199 PATIENT'S NONCOMPLIANCE WITH OTHER MEDICAL TREATMENT AND REGIMEN DUE TO UNSPECIFIED REASON: ICD-10-CM

## 2023-05-25 DIAGNOSIS — K76.0 FATTY (CHANGE OF) LIVER, NOT ELSEWHERE CLASSIFIED: ICD-10-CM

## 2023-05-25 DIAGNOSIS — Z87.891 PERSONAL HISTORY OF NICOTINE DEPENDENCE: ICD-10-CM

## 2023-05-25 DIAGNOSIS — Z91.A28 CAREGIVER'S INTENTIONAL UNDERDOSING OF MEDICATION REGIMEN FOR OTHER REASON: ICD-10-CM

## 2023-05-25 DIAGNOSIS — J18.9 PNEUMONIA, UNSPECIFIED ORGANISM: ICD-10-CM

## 2023-05-25 DIAGNOSIS — I42.8 OTHER CARDIOMYOPATHIES: ICD-10-CM

## 2023-05-25 DIAGNOSIS — K76.1 CHRONIC PASSIVE CONGESTION OF LIVER: ICD-10-CM

## 2023-05-25 DIAGNOSIS — T50.1X6A UNDERDOSING OF LOOP [HIGH-CEILING] DIURETICS, INITIAL ENCOUNTER: ICD-10-CM

## 2023-05-25 DIAGNOSIS — Z20.822 CONTACT WITH AND (SUSPECTED) EXPOSURE TO COVID-19: ICD-10-CM

## 2023-05-25 DIAGNOSIS — E11.9 TYPE 2 DIABETES MELLITUS WITHOUT COMPLICATIONS: ICD-10-CM

## 2023-05-25 DIAGNOSIS — I49.3 VENTRICULAR PREMATURE DEPOLARIZATION: ICD-10-CM

## 2023-05-25 DIAGNOSIS — Z91.148 PATIENT'S OTHER NONCOMPLIANCE WITH MEDICATION REGIMEN FOR OTHER REASON: ICD-10-CM

## 2023-05-25 DIAGNOSIS — I34.0 NONRHEUMATIC MITRAL (VALVE) INSUFFICIENCY: ICD-10-CM

## 2023-05-25 DIAGNOSIS — J90 PLEURAL EFFUSION, NOT ELSEWHERE CLASSIFIED: ICD-10-CM

## 2023-05-25 DIAGNOSIS — F10.11 ALCOHOL ABUSE, IN REMISSION: ICD-10-CM

## 2023-05-25 DIAGNOSIS — J96.01 ACUTE RESPIRATORY FAILURE WITH HYPOXIA: ICD-10-CM

## 2023-05-25 DIAGNOSIS — I11.0 HYPERTENSIVE HEART DISEASE WITH HEART FAILURE: ICD-10-CM

## 2023-05-25 DIAGNOSIS — F41.9 ANXIETY DISORDER, UNSPECIFIED: ICD-10-CM

## 2023-05-25 DIAGNOSIS — E87.6 HYPOKALEMIA: ICD-10-CM

## 2023-05-25 DIAGNOSIS — I45.10 UNSPECIFIED RIGHT BUNDLE-BRANCH BLOCK: ICD-10-CM

## 2023-05-25 NOTE — ASSESSMENT
[FreeTextEntry1] : \par He is euvolemic and tolerating good doses of GDMT, limited by lack of insurance coverage at his this time.\par \par # Acute systolic heart failure\par -Etiology: NICM. would obtain cMRI if able to get done before LION placement\par -GDMT:  losartan 100 mg daily,  toprol XL 25, spironolactone 25. SGL2i/ARNI when he has insurance coverage \par -Diuretics: euvolemic off diuretics\par -Device: needs reassessment of LVEF on max GDMT as outpatient\par - Labs at d/c on 5/17: Na 136, K 4.0, Cl 95, CO2 30, BUN 21, Cr 0.84, proBNP on admission (5/11/2023) 11,787. \par \par #Htn\par -well controlled on GDMT\par \par # Transaminitis\par -Likely related to congestion on arrival, now improving with GDMT and afterload reduction \par -- Labs at d/c on 5/17: Tbili 1.9, , \par \par No contraindications to discharge from HF perspective, please arrange f/u with our team on discharge.\par \par

## 2023-05-25 NOTE — CARDIOLOGY SUMMARY
[de-identified] : EKG 5/2023: SR, RBBB with -150 ms\par  [de-identified] : TTE 5/2023: LV 6.5 cm, LVEF 15-20% with global hypokinesis, LVOT VTI 5 cm, moderate functional MR, mild-moderate TR\par  [de-identified] : [facility] 5/2023: mild luminal irregularities, LVEDP 4 mmHg\par [facility] 5/2023: RA 2, PCWP 9, RV 20/1/14, PA Sat 66%, PA 29/14/19 AoSat 90%, CO/CI (F) 5.2/ 2.55

## 2023-05-25 NOTE — HISTORY OF PRESENT ILLNESS
[FreeTextEntry1] : 76 YO M w/ ACC/AHA Stage C NICM w/longstanding HFrEF (LV 6.5cm, LVEF 15-20%) initially dx at [facility], HTN, T2DM (A1C 6.1% in 5/2023), depression, former tobacco user (Quit 1990, [facility]). Historically w/little medical follow up and medication adversary. Presents to the HF Clinic for further management. \par \par He was previously following [facility] for HF but remained medication skeptical and elected to rely on hemopathies instead. \par \par Admitted to [facility] 5/9-5/17/2023 presenting w/one week of dull SSCP, CARTAGENA and orthopnea, found to be in ADHF. He was hypertensive on arrival and his TTE revealed severe LV dysfunction and R/LHC revealed normal coronaries and hemodynamics. Discharged to [facility] low doses of GDMT as medication has been cost prohibitive at a weight of 178 pounds. \par \par \par TTE 5/2023: LV 6.5 cm, LVEF 15-20% with global hypokinesis, LVOT VTI 5 cm, moderate functional MR, mild-moderate TR

## 2023-06-05 ENCOUNTER — TRANSCRIPTION ENCOUNTER (OUTPATIENT)
Age: 78
End: 2023-06-05

## 2023-06-15 NOTE — ED ADULT TRIAGE NOTE - ARRIVAL INFO ADDITIONAL COMMENTS
Schedule with PT  Can use voltaren gel up to 4x per day (this is a topical anti-inflammatory)  Follow-up in 2 mos     Try to add weight bearing activity 1-2x per week  Add protein source with breakfast (eggs, yogurt, nuts)  Cut back cream in coffee if you think that's in excess, goal no more than 1 TBSP per cup of coffee    I will keep you posted on the labs   
pt states he has been on antidepressants since june 2021 and ran out of them several months ago and has been getting more depressed since.  he now feels suicidal

## 2023-10-16 NOTE — CONSULT NOTE ADULT - SUBJECTIVE AND OBJECTIVE BOX
HPI:  AOx3, Stable, Full Code     77M  former smoker, ( was a heavy smoker and quit 37 yrs ago) ambulates with walker  with PMhx HTN, DM2, right hip surgery one year ago ([facility])remote h/o EToH (quit 3-5yrs ago), depression (zoloft),  diagnosed with CHF at OSH in 2021 and lost to follow up, non compliant with medications presents [facility] 5/10 c/o several days of dull, intermittent SSCP with SOB and orthopnea. No LE edema.     In speaking with patient, he reports one week of SSCP, 3/10 dull discomfort with CARTAGENA minimal exertion (ambulating one block), 2+ pillow orthopnea for one week.  Pt. states he has not seen a physician for approximately 10 yrs and does not have a PCP  or Cardiologist.  According to patient,  he was last seen by a physician at [facility] before hip surgery and was told he had HF in which he was prescribed medication.  Pt. admits to not taking medication and instead took over counter vitamins because he believes in homeopathic medicine.  However, after interviewer spoke with patient regarding the importance of medical follow up,  patient agrees to start seeing a Cardiologist and  being compliant with medication.  Pt. denies fever, chills, HA, chest pain, palpitations, dizziness, diaphoresis, abdominal discomfort and n/v/d..    ED workup reveals:   - Vitals: 160/95, HR 87, SpO2 95% on RA   - Workup: EKG with old RBBB, new diffuse TWI compared to prior. +edema and JVD, desat to 80s while lying down. CTA neg for PE, +bilat pleural effusions.   - Labs: K 2.9, WBC 14K, BNP 22K, LFTs elevated.   - Meds: Pt treated with lasix 20mg ivp + 40mg ivp and K repletion. BP 150s/100s treated with lisinopril.     Now admitted for further management of acute exacerbation of chronic chf. (10 May 2023 00:56)      ROS: A 10-point review of systems was otherwise negative.    PAST MEDICAL & SURGICAL HISTORY:  HTN (hypertension)      DM2 (diabetes mellitus, type 2)      Heart failure      Anxiety and depression        SOCIAL HISTORY:  FAMILY HISTORY:    ALLERGIES: 	  No Known Allergies          MEDICATIONS:  aluminum hydroxide/magnesium hydroxide/simethicone Suspension 30 milliLiter(s) Oral every 4 hours PRN  bismuth subsalicylate Liquid 15 milliLiter(s) Oral every 4 hours PRN  insulin lispro (ADMELOG) corrective regimen sliding scale   SubCutaneous Before meals and at bedtime  ipratropium    for Nebulization 500 MICROGram(s) Nebulizer every 6 hours PRN  lactobacillus acidophilus 1 Tablet(s) Oral daily  losartan 100 milliGRAM(s) Oral daily  metoprolol succinate ER 25 milliGRAM(s) Oral daily  pantoprazole    Tablet 40 milliGRAM(s) Oral before breakfast  spironolactone 25 milliGRAM(s) Oral daily      PHYSICAL EXAM:  T(C): 36.7 (05-17-23 @ 13:35), Max: 36.9 (05-17-23 @ 06:27)  HR: 110 (05-17-23 @ 11:40) (79 - 112)  BP: 140/93 (05-17-23 @ 11:40) (108/66 - 140/93)  RR: 17 (05-17-23 @ 11:40) (16 - 18)  SpO2: 98% (05-17-23 @ 11:40) (92% - 98%)  Wt(kg): --    GEN: Awake, comfortable. NAD.   HEENT: NCAT, PERRL, EOMI. Mucosa moist. No JVD.   RESP: CTA b/l  CV: RRR, normal s1/s2.  ABD: Soft, NTND. BS+  EXT: Warm. trace LE edema    NEURO: AAOx3. No focal deficits.    I&O's Summary    16 May 2023 07:01  -  17 May 2023 07:00  --------------------------------------------------------  IN: 537 mL / OUT: 775 mL / NET: -238 mL    17 May 2023 07:01  -  17 May 2023 15:00  --------------------------------------------------------  IN: 380 mL / OUT: 425 mL / NET: -45 mL        LABS:	 	                        15.5   8.30  )-----------( 211      ( 17 May 2023 05:30 )             47.6     05-17    136  |  95<L>  |  21  ----------------------------<  182<H>  4.0   |  30  |  0.84    Ca    8.6      17 May 2023 05:30  Mg     2.1     05-17    TPro  6.0  /  Alb  3.3  /  TBili  0.7  /  DBili  x   /  AST  49<H>  /  ALT  120<H>  /  AlkPhos  79  05-17     Spironolactone Counseling: Patient advised regarding risks of diarrhea, abdominal pain, hyperkalemia, birth defects (for female patients), liver toxicity and renal toxicity. The patient may need blood work to monitor liver and kidney function and potassium levels while on therapy. The patient verbalized understanding of the proper use and possible adverse effects of spironolactone.  All of the patient's questions and concerns were addressed.

## 2024-06-12 NOTE — ED ADULT NURSE NOTE - HYGIENE
Can you help pt reschedule his New Pt visit   HR=43 bpm, NIBP=96/58 mmhg, CsS7=877.0 %, Resp=15 B/min, EtCO2=41 mmHg, Apnea=3 Seconds, Pain=0, Mauro=2 Good

## 2024-09-24 NOTE — DIETITIAN INITIAL EVALUATION ADULT - HEIGHT FOR BMI (CENTIMETERS)
The patient's goals for the shift include no fall or injury by the end of shift    The clinical goals for the shift include remain HDS      Problem: Skin  Goal: Decreased wound size/increased tissue granulation at next dressing change  Outcome: Progressing  Flowsheets (Taken 9/23/2024 2221)  Decreased wound size/increased tissue granulation at next dressing change:   Promote sleep for wound healing   Protective dressings over bony prominences   Utilize specialty bed per algorithm  Goal: Participates in plan/prevention/treatment measures  Outcome: Progressing  Flowsheets (Taken 9/23/2024 2221)  Participates in plan/prevention/treatment measures:   Discuss with provider PT/OT consult   Elevate heels   Increase activity/out of bed for meals  Goal: Prevent/manage excess moisture  Outcome: Progressing  Flowsheets (Taken 9/23/2024 2221)  Prevent/manage excess moisture:   Monitor for/manage infection if present   Cleanse incontinence/protect with barrier cream   Use wicking fabric (obtain order)   Follow provider orders for dressing changes   Moisturize dry skin  Goal: Prevent/minimize sheer/friction injuries  Outcome: Progressing  Flowsheets (Taken 9/23/2024 2221)  Prevent/minimize sheer/friction injuries:   Increase activity/out of bed for meals   Use pull sheet   Complete micro-shifts as needed if patient unable. Adjust patient position to relieve pressure points, not a full turn   HOB 30 degrees or less   Turn/reposition every 2 hours/use positioning/transfer devices   Utilize specialty bed per algorithm  Goal: Promote/optimize nutrition  Outcome: Progressing  Flowsheets (Taken 9/23/2024 2221)  Promote/optimize nutrition:   Monitor/record intake including meals   Assist with feeding   Offer water/supplements/favorite foods   Consume > 50% meals/supplements   Discuss with provider if NPO > 2 days   Reassess MST if dietician not consulted  Goal: Promote skin healing  Outcome: Progressing  Flowsheets (Taken 9/23/2024  2221)  Promote skin healing:   Turn/reposition every 2 hours/use positioning/transfer devices   Protective dressings over bony prominences   Assess skin/pad under line(s)/device(s)   Ensure correct size (line/device) and apply per  instructions   Rotate device position/do not position patient on device     Problem: Safety - Adult  Goal: Free from fall injury  Outcome: Progressing  Flowsheets (Taken 9/23/2024 2221)  Free from fall injury:   Based on caregiver fall risk screen, instruct family/caregiver to ask for assistance with transferring infant if caregiver noted to have fall risk factors   Instruct family/caregiver on patient safety     Problem: Discharge Planning  Goal: Discharge to home or other facility with appropriate resources  Outcome: Progressing  Flowsheets (Taken 9/23/2024 2221)  Discharge to home or other facility with appropriate resources:   Refer to discharge planning if patient needs post-hospital services based on physician order or complex needs related to functional status, cognitive ability or social support system   Arrange for interpreters to assist at discharge as needed   Identify barriers to discharge with patient and caregiver   Arrange for needed discharge resources and transportation as appropriate   Identify discharge learning needs (meds, wound care, etc)     Problem: Chronic Conditions and Co-morbidities  Goal: Patient's chronic conditions and co-morbidity symptoms are monitored and maintained or improved  Outcome: Progressing  Flowsheets (Taken 9/23/2024 2221)  Care Plan - Patient's Chronic Conditions and Co-Morbidity Symptoms are Monitored and Maintained or Improved:   Monitor and assess patient's chronic conditions and comorbid symptoms for stability, deterioration, or improvement   Collaborate with multidisciplinary team to address chronic and comorbid conditions and prevent exacerbation or deterioration   Update acute care plan with appropriate goals if chronic or  comorbid symptoms are exacerbated and prevent overall improvement and discharge     Problem: Fall/Injury  Goal: Not fall by end of shift  Outcome: Progressing  Goal: Be free from injury by end of the shift  Outcome: Progressing  Goal: Verbalize understanding of personal risk factors for fall in the hospital  Outcome: Progressing  Goal: Verbalize understanding of risk factor reduction measures to prevent injury from fall in the home  Outcome: Progressing  Goal: Use assistive devices by end of the shift  Outcome: Progressing  Goal: Pace activities to prevent fatigue by end of the shift  Outcome: Progressing        177.8

## 2024-10-01 NOTE — DIETITIAN INITIAL EVALUATION ADULT - OBTAIN CURRENT WEIGHT
In an effort to ensure that our patients LiveWell, a Team Member has reviewed your chart and identified an opportunity to provide the best care possible. An attempt was made to discuss or schedule due or overdue Preventive or Chronic Condition care.Care Gaps identified: Wellness Visits.    The Outcome was . Letter sent.    Type of Appointment needed: Primary Care Visit   yes